# Patient Record
Sex: MALE | Race: ASIAN | NOT HISPANIC OR LATINO | URBAN - METROPOLITAN AREA
[De-identification: names, ages, dates, MRNs, and addresses within clinical notes are randomized per-mention and may not be internally consistent; named-entity substitution may affect disease eponyms.]

---

## 2017-10-10 ENCOUNTER — EMERGENCY (EMERGENCY)
Facility: HOSPITAL | Age: 72
LOS: 0 days | Discharge: HOME | End: 2017-10-10
Attending: EMERGENCY MEDICINE
Payer: MEDICARE

## 2017-10-10 VITALS
TEMPERATURE: 98 F | DIASTOLIC BLOOD PRESSURE: 65 MMHG | RESPIRATION RATE: 18 BRPM | WEIGHT: 156.09 LBS | HEART RATE: 68 BPM | OXYGEN SATURATION: 98 % | SYSTOLIC BLOOD PRESSURE: 131 MMHG

## 2017-10-10 VITALS
OXYGEN SATURATION: 99 % | DIASTOLIC BLOOD PRESSURE: 79 MMHG | RESPIRATION RATE: 17 BRPM | SYSTOLIC BLOOD PRESSURE: 139 MMHG | HEART RATE: 77 BPM | TEMPERATURE: 98 F

## 2017-10-10 LAB
ALBUMIN SERPL ELPH-MCNC: 2.3 G/DL — LOW (ref 3.3–5)
ALP SERPL-CCNC: 107 U/L — SIGNIFICANT CHANGE UP (ref 40–120)
ALT FLD-CCNC: 27 U/L — SIGNIFICANT CHANGE UP (ref 12–78)
ANION GAP SERPL CALC-SCNC: 8 MMOL/L — SIGNIFICANT CHANGE UP (ref 5–17)
APPEARANCE UR: CLEAR — SIGNIFICANT CHANGE UP
AST SERPL-CCNC: 16 U/L — SIGNIFICANT CHANGE UP (ref 15–37)
BACTERIA # UR AUTO: ABNORMAL
BASOPHILS # BLD AUTO: 0.1 K/UL — SIGNIFICANT CHANGE UP (ref 0–0.2)
BASOPHILS NFR BLD AUTO: 1.1 % — SIGNIFICANT CHANGE UP (ref 0–2)
BILIRUB SERPL-MCNC: 0.4 MG/DL — SIGNIFICANT CHANGE UP (ref 0.2–1.2)
BILIRUB UR-MCNC: NEGATIVE — SIGNIFICANT CHANGE UP
BUN SERPL-MCNC: 20 MG/DL — SIGNIFICANT CHANGE UP (ref 7–23)
CALCIUM SERPL-MCNC: 9 MG/DL — SIGNIFICANT CHANGE UP (ref 8.5–10.1)
CHLORIDE SERPL-SCNC: 103 MMOL/L — SIGNIFICANT CHANGE UP (ref 96–108)
CO2 SERPL-SCNC: 26 MMOL/L — SIGNIFICANT CHANGE UP (ref 22–31)
COLOR SPEC: YELLOW — SIGNIFICANT CHANGE UP
CREAT SERPL-MCNC: 0.93 MG/DL — SIGNIFICANT CHANGE UP (ref 0.5–1.3)
DIFF PNL FLD: ABNORMAL
EOSINOPHIL # BLD AUTO: 0.3 K/UL — SIGNIFICANT CHANGE UP (ref 0–0.5)
EOSINOPHIL NFR BLD AUTO: 3.3 % — SIGNIFICANT CHANGE UP (ref 0–6)
GLUCOSE SERPL-MCNC: 189 MG/DL — HIGH (ref 70–99)
GLUCOSE UR QL: 250 MG/DL
HCT VFR BLD CALC: 34.4 % — LOW (ref 39–50)
HGB BLD-MCNC: 11.1 G/DL — LOW (ref 13–17)
KETONES UR-MCNC: NEGATIVE — SIGNIFICANT CHANGE UP
LEUKOCYTE ESTERASE UR-ACNC: ABNORMAL
LYMPHOCYTES # BLD AUTO: 2.4 K/UL — SIGNIFICANT CHANGE UP (ref 1–3.3)
LYMPHOCYTES # BLD AUTO: 23.4 % — SIGNIFICANT CHANGE UP (ref 13–44)
MCHC RBC-ENTMCNC: 29.1 PG — SIGNIFICANT CHANGE UP (ref 27–34)
MCHC RBC-ENTMCNC: 32.2 GM/DL — SIGNIFICANT CHANGE UP (ref 32–36)
MCV RBC AUTO: 90.4 FL — SIGNIFICANT CHANGE UP (ref 80–100)
MONOCYTES # BLD AUTO: 1.5 K/UL — HIGH (ref 0–0.9)
MONOCYTES NFR BLD AUTO: 14.4 % — HIGH (ref 2–14)
NEUTROPHILS # BLD AUTO: 6 K/UL — SIGNIFICANT CHANGE UP (ref 1.8–7.4)
NEUTROPHILS NFR BLD AUTO: 57.8 % — SIGNIFICANT CHANGE UP (ref 43–77)
NITRITE UR-MCNC: NEGATIVE — SIGNIFICANT CHANGE UP
PH UR: 5 — SIGNIFICANT CHANGE UP (ref 5–8)
PLATELET # BLD AUTO: 163 K/UL — SIGNIFICANT CHANGE UP (ref 150–400)
POTASSIUM SERPL-MCNC: 3.9 MMOL/L — SIGNIFICANT CHANGE UP (ref 3.5–5.3)
POTASSIUM SERPL-SCNC: 3.9 MMOL/L — SIGNIFICANT CHANGE UP (ref 3.5–5.3)
PROT SERPL-MCNC: 6.2 GM/DL — SIGNIFICANT CHANGE UP (ref 6–8.3)
PROT UR-MCNC: NEGATIVE MG/DL — SIGNIFICANT CHANGE UP
RBC # BLD: 3.81 M/UL — LOW (ref 4.2–5.8)
RBC # FLD: 13 % — SIGNIFICANT CHANGE UP (ref 11–15)
RBC CASTS # UR COMP ASSIST: ABNORMAL /HPF (ref 0–4)
SODIUM SERPL-SCNC: 137 MMOL/L — SIGNIFICANT CHANGE UP (ref 135–145)
SP GR SPEC: 1 — LOW (ref 1.01–1.02)
UROBILINOGEN FLD QL: NEGATIVE MG/DL — SIGNIFICANT CHANGE UP
WBC # BLD: 10.4 K/UL — SIGNIFICANT CHANGE UP (ref 3.8–10.5)
WBC # FLD AUTO: 10.4 K/UL — SIGNIFICANT CHANGE UP (ref 3.8–10.5)

## 2017-10-10 PROCEDURE — 99285 EMERGENCY DEPT VISIT HI MDM: CPT | Mod: 25

## 2017-10-10 PROCEDURE — 74177 CT ABD & PELVIS W/CONTRAST: CPT | Mod: 26

## 2017-10-10 RX ORDER — CIPROFLOXACIN LACTATE 400MG/40ML
400 VIAL (ML) INTRAVENOUS ONCE
Refills: 0 | Status: COMPLETED | OUTPATIENT
Start: 2017-10-10 | End: 2017-10-10

## 2017-10-10 RX ORDER — CIPROFLOXACIN LACTATE 400MG/40ML
400 VIAL (ML) INTRAVENOUS ONCE
Refills: 0 | Status: DISCONTINUED | OUTPATIENT
Start: 2017-10-10 | End: 2017-10-10

## 2017-10-10 RX ORDER — SODIUM CHLORIDE 9 MG/ML
1000 INJECTION INTRAMUSCULAR; INTRAVENOUS; SUBCUTANEOUS ONCE
Refills: 0 | Status: COMPLETED | OUTPATIENT
Start: 2017-10-10 | End: 2017-10-10

## 2017-10-10 RX ORDER — CIPROFLOXACIN LACTATE 400MG/40ML
1 VIAL (ML) INTRAVENOUS
Qty: 14 | Refills: 0
Start: 2017-10-10 | End: 2017-10-17

## 2017-10-10 RX ADMIN — Medication 200 MILLIGRAM(S): at 09:54

## 2017-10-10 RX ADMIN — SODIUM CHLORIDE 1000 MILLILITER(S): 9 INJECTION INTRAMUSCULAR; INTRAVENOUS; SUBCUTANEOUS at 04:34

## 2017-10-10 NOTE — ED PROVIDER NOTE - MEDICAL DECISION MAKING DETAILS
Patient with persistent UTI and occasional urinary incontinence.  Lab and imaging studies were obtained in accordance with patient's chief complaint and reviewed.  Suspect poorly controlled glucose might be contributing since sugars at home have been high, but patient also has renal cell mass which needs further work up.  No signs of DKA.  Recommend completing his course of antibiotic and following up with urology and PMD.  Discussed results and outcome of today's visit with the patient.  Patient advised to please follow up with another healthcare provider within the next 24 hours and return to the Emergency Department for worsening symptoms or any other concerns.  Patient advised that their doctor may call  to follow up on the specific results of the tests performed today in the emergency department.   Patient appears well on discharge. Patient with persistent UTI and occasional urinary incontinence.  Lab and imaging studies were obtained in accordance with patient's chief complaint and reviewed.  Suspect poorly controlled glucose might be contributing since sugars at home have been high, but patient also has renal cell mass which needs further work up.  No signs of DKA.  Will given single dose IV cipro in ER with outpatient treatment (stop macrobid) and follow up with urology and PMD.  Discussed results and outcome of today's visit with the patient.  Patient advised to please follow up with another healthcare provider within the next 24 hours and return to the Emergency Department for worsening symptoms or any other concerns.  Patient advised that their doctor may call  to follow up on the specific results of the tests performed today in the emergency department.   Patient appears well on discharge. Patient with persistent UTI and occasional urinary incontinence.  Lab and imaging studies were obtained in accordance with patient's chief complaint and reviewed.  Suspect poorly controlled glucose might be contributing since sugars at home have been high, but patient also has renal cell mass which needs further work up.  No signs of DKA.  Will given single dose IV cipro in ER with outpatient treatment (stop macrobid) and follow up with urology and PMD.  Family also requesting referral for neuro due to frequent forgetfulness which has been occuring for a while now.  Discussed results and outcome of today's visit with the patient.  Patient advised to please follow up with another healthcare provider within the next 24 hours and return to the Emergency Department for worsening symptoms or any other concerns.  Patient advised that their doctor may call  to follow up on the specific results of the tests performed today in the emergency department.   Patient appears well on discharge.

## 2017-10-10 NOTE — ED PROVIDER NOTE - OBJECTIVE STATEMENT
Pertinent PMH/PSH/FHx/SHx and Review of Systems contained within:  Patient presents to the ED for   dysuria and increased frequency with urinary incontinence, feels his urine has been leaking out.  Has been having symptoms for over a week now, seen by his PMD, initially on amoxicillin, switched to Macobid (has been on it for 1 day) when no significant improvement.  Has some suprapubic discomfort, no fevers, chills, n/v, flank pain.  Initially had hematuria, but says that this cleared up.    Says that he is not sexually active.  Denies any neurologic deficits, weakness or numbness.       Relevant PMHx/SHx/SOCHx/FAMH:    HTN, HLD, DM    Patient denies EtOH/tobacco/illicit substance use.    PMD: Dr. Bryson      ROS: No fever/chills, No headache/photophobia/eye pain/changes in vision, No ear pain/sore throat/dysphagia, No chest pain/palpitations, no SOB/cough/wheeze/stridor, No abdominal pain, No N/V/D/melena, no discharge, No neck/back pain, no rash, no changes in neurological status/function.

## 2017-10-10 NOTE — ED PROVIDER NOTE - CARE PLAN
Principal Discharge DX:	UTI (urinary tract infection)  Secondary Diagnosis:	Hyperglycemia  Secondary Diagnosis:	Renal mass

## 2017-10-10 NOTE — ED ADULT NURSE NOTE - OBJECTIVE STATEMENT
pt came in with c/o burning and frequency urination with tinge of blood , denies any trauma , not in any distress, will monitor.

## 2017-10-10 NOTE — ED ADULT TRIAGE NOTE - CHIEF COMPLAINT QUOTE
I cant hold my urine, I urinate a lot.  burning on urination. Per Tosin with ANTOLIN, the pt has to be in the building before 6 pm. I updated AARTI Noe CM. Cindy Duvall LMSW

## 2017-10-10 NOTE — ED ADULT NURSE REASSESSMENT NOTE - NS ED NURSE REASSESS COMMENT FT1
Rec'd pt on stretcher appears to be in no apparent distress at this time. Awake, A&Ox4; breathing with ease on room air. NS infusing via 20G saline lock to the RT. AC, no signs of infiltration/phlebitis. Denies complaints of pain at this time. Pt just returned  from Ct. scan, awaiting results and disposition. Safety measures in place. Will continue to monitor.

## 2017-10-11 DIAGNOSIS — R32 UNSPECIFIED URINARY INCONTINENCE: ICD-10-CM

## 2017-10-11 DIAGNOSIS — E11.65 TYPE 2 DIABETES MELLITUS WITH HYPERGLYCEMIA: ICD-10-CM

## 2017-10-11 DIAGNOSIS — I10 ESSENTIAL (PRIMARY) HYPERTENSION: ICD-10-CM

## 2017-10-11 DIAGNOSIS — N39.0 URINARY TRACT INFECTION, SITE NOT SPECIFIED: ICD-10-CM

## 2017-10-11 DIAGNOSIS — N28.89 OTHER SPECIFIED DISORDERS OF KIDNEY AND URETER: ICD-10-CM

## 2017-10-11 LAB
CULTURE RESULTS: NO GROWTH — SIGNIFICANT CHANGE UP
SPECIMEN SOURCE: SIGNIFICANT CHANGE UP

## 2017-10-24 ENCOUNTER — APPOINTMENT (OUTPATIENT)
Dept: UROLOGY | Facility: CLINIC | Age: 72
End: 2017-10-24

## 2019-08-30 ENCOUNTER — EMERGENCY (EMERGENCY)
Facility: HOSPITAL | Age: 74
LOS: 0 days | Discharge: HOME | End: 2019-08-31
Attending: EMERGENCY MEDICINE
Payer: MEDICARE

## 2019-08-30 VITALS
TEMPERATURE: 97 F | SYSTOLIC BLOOD PRESSURE: 146 MMHG | HEIGHT: 65 IN | RESPIRATION RATE: 20 BRPM | DIASTOLIC BLOOD PRESSURE: 80 MMHG | OXYGEN SATURATION: 97 % | HEART RATE: 57 BPM | WEIGHT: 162.04 LBS

## 2019-08-30 DIAGNOSIS — E11.9 TYPE 2 DIABETES MELLITUS WITHOUT COMPLICATIONS: ICD-10-CM

## 2019-08-30 DIAGNOSIS — N17.9 ACUTE KIDNEY FAILURE, UNSPECIFIED: ICD-10-CM

## 2019-08-30 DIAGNOSIS — H53.8 OTHER VISUAL DISTURBANCES: ICD-10-CM

## 2019-08-30 DIAGNOSIS — I10 ESSENTIAL (PRIMARY) HYPERTENSION: ICD-10-CM

## 2019-08-30 DIAGNOSIS — H53.9 UNSPECIFIED VISUAL DISTURBANCE: ICD-10-CM

## 2019-08-30 LAB
ALBUMIN SERPL ELPH-MCNC: 3.5 G/DL — SIGNIFICANT CHANGE UP (ref 3.3–5)
ALP SERPL-CCNC: 85 U/L — SIGNIFICANT CHANGE UP (ref 40–120)
ALT FLD-CCNC: 23 U/L — SIGNIFICANT CHANGE UP (ref 12–78)
ANION GAP SERPL CALC-SCNC: 8 MMOL/L — SIGNIFICANT CHANGE UP (ref 5–17)
AST SERPL-CCNC: 14 U/L — LOW (ref 15–37)
BASOPHILS # BLD AUTO: 0.05 K/UL — SIGNIFICANT CHANGE UP (ref 0–0.2)
BASOPHILS NFR BLD AUTO: 0.7 % — SIGNIFICANT CHANGE UP (ref 0–2)
BILIRUB SERPL-MCNC: 0.2 MG/DL — SIGNIFICANT CHANGE UP (ref 0.2–1.2)
BUN SERPL-MCNC: 33 MG/DL — HIGH (ref 7–23)
CALCIUM SERPL-MCNC: 9.2 MG/DL — SIGNIFICANT CHANGE UP (ref 8.5–10.1)
CHLORIDE SERPL-SCNC: 106 MMOL/L — SIGNIFICANT CHANGE UP (ref 96–108)
CO2 SERPL-SCNC: 26 MMOL/L — SIGNIFICANT CHANGE UP (ref 22–31)
CREAT SERPL-MCNC: 1.76 MG/DL — HIGH (ref 0.5–1.3)
EOSINOPHIL # BLD AUTO: 0.42 K/UL — SIGNIFICANT CHANGE UP (ref 0–0.5)
EOSINOPHIL NFR BLD AUTO: 6.1 % — HIGH (ref 0–6)
GLUCOSE BLDC GLUCOMTR-MCNC: 169 MG/DL — HIGH (ref 70–99)
GLUCOSE SERPL-MCNC: 173 MG/DL — HIGH (ref 70–99)
HCT VFR BLD CALC: 43.6 % — SIGNIFICANT CHANGE UP (ref 39–50)
HGB BLD-MCNC: 13.9 G/DL — SIGNIFICANT CHANGE UP (ref 13–17)
IMM GRANULOCYTES NFR BLD AUTO: 0.1 % — SIGNIFICANT CHANGE UP (ref 0–1.5)
LYMPHOCYTES # BLD AUTO: 2.21 K/UL — SIGNIFICANT CHANGE UP (ref 1–3.3)
LYMPHOCYTES # BLD AUTO: 32 % — SIGNIFICANT CHANGE UP (ref 13–44)
MCHC RBC-ENTMCNC: 29.3 PG — SIGNIFICANT CHANGE UP (ref 27–34)
MCHC RBC-ENTMCNC: 31.9 GM/DL — LOW (ref 32–36)
MCV RBC AUTO: 91.8 FL — SIGNIFICANT CHANGE UP (ref 80–100)
MONOCYTES # BLD AUTO: 0.65 K/UL — SIGNIFICANT CHANGE UP (ref 0–0.9)
MONOCYTES NFR BLD AUTO: 9.4 % — SIGNIFICANT CHANGE UP (ref 2–14)
NEUTROPHILS # BLD AUTO: 3.57 K/UL — SIGNIFICANT CHANGE UP (ref 1.8–7.4)
NEUTROPHILS NFR BLD AUTO: 51.7 % — SIGNIFICANT CHANGE UP (ref 43–77)
NRBC # BLD: 0 /100 WBCS — SIGNIFICANT CHANGE UP (ref 0–0)
PLATELET # BLD AUTO: 157 K/UL — SIGNIFICANT CHANGE UP (ref 150–400)
POTASSIUM SERPL-MCNC: 5.2 MMOL/L — SIGNIFICANT CHANGE UP (ref 3.5–5.3)
POTASSIUM SERPL-SCNC: 5.2 MMOL/L — SIGNIFICANT CHANGE UP (ref 3.5–5.3)
PROT SERPL-MCNC: 7.6 GM/DL — SIGNIFICANT CHANGE UP (ref 6–8.3)
RBC # BLD: 4.75 M/UL — SIGNIFICANT CHANGE UP (ref 4.2–5.8)
RBC # FLD: 13.1 % — SIGNIFICANT CHANGE UP (ref 10.3–14.5)
SODIUM SERPL-SCNC: 140 MMOL/L — SIGNIFICANT CHANGE UP (ref 135–145)
WBC # BLD: 6.91 K/UL — SIGNIFICANT CHANGE UP (ref 3.8–10.5)
WBC # FLD AUTO: 6.91 K/UL — SIGNIFICANT CHANGE UP (ref 3.8–10.5)

## 2019-08-30 PROCEDURE — 99284 EMERGENCY DEPT VISIT MOD MDM: CPT

## 2019-08-30 RX ORDER — SODIUM CHLORIDE 9 MG/ML
1000 INJECTION INTRAMUSCULAR; INTRAVENOUS; SUBCUTANEOUS ONCE
Refills: 0 | Status: COMPLETED | OUTPATIENT
Start: 2019-08-30 | End: 2019-08-30

## 2019-08-30 RX ADMIN — SODIUM CHLORIDE 1000 MILLILITER(S): 9 INJECTION INTRAMUSCULAR; INTRAVENOUS; SUBCUTANEOUS at 23:02

## 2019-08-30 NOTE — ED PROVIDER NOTE - CLINICAL SUMMARY MEDICAL DECISION MAKING FREE TEXT BOX
Patient with blurry left vision.  VSS. Patient with blurry left eye vision.  VSS.  Labs with renal dysfunction, patient later informed me that he has single kidney, recheck of BMP shows improved initial labs, patient strongly advised to notify medical staff about his renal status.  Patient CT imaging neg for dissection.  Patient d/w ophthalmology.  Patient to be seen in office for possible retinal dysfunction today at 10 am, Dr. Xavier called patient while he was still in ER to discuss the plan.  Patient will be going to ophthalmology clinic today and is advised to f/u with his PMD for f/u and recheck kidney function.  Discussed results and outcome of today's visit with the patient.  Patient advised to please follow up with another healthcare provider within the next 24 hours and return to the Emergency Department for worsening symptoms or any other concerns.  Patient advised that their doctor may call  to follow up on the specific results of the tests performed today in the emergency department.   Patient appears well on discharge. Patient with blurry left eye vision.  VSS.  Labs with renal dysfunction, patient later informed me that he has single kidney, recheck of BMP shows improved initial labs, patient strongly advised to notify medical staff about his renal status goiong forward (added to his chart).  Patient CT imaging neg for dissection or other intracranial pathology.  Patient d/w ophthalmology.  Patient to be seen in office for possible retinal dysfunction today at 10 am, Dr. Xavier called patient while he was still in ER to discuss the plan.  Patient will be going to ophthalmology clinic today and is advised to f/u with his PMD for f/u and recheck kidney function.  Discussed results and outcome of today's visit with the patient.  Patient advised to please follow up with another healthcare provider within the next 24 hours and return to the Emergency Department for worsening symptoms or any other concerns.  Patient advised that their doctor may call  to follow up on the specific results of the tests performed today in the emergency department.   Patient appears well on discharge.

## 2019-08-30 NOTE — ED PROVIDER NOTE - PHYSICAL EXAMINATION
Gen: Alert, NAD, well appearing  Head: NC, AT, no temple tenderness  Eyes: Right eye visual acuity 20/25, left eye completely blurry, peripheral vision preserved BL, PERRL, EOMI, normal lids/conjunctiva, right eye IOP 22-23, left eye IOP 15-17, negative fluoresceine study  ENT: normal hearing, patent oropharynx without erythema/exudate, uvula midline  Neck: +supple, no tenderness/meningismus/JVD, +Trachea midline  Pulm: Bilateral BS, normal resp effort, no wheeze/stridor/retractions  CV: RRR, no M/R/G, +dist pulses  Abd: soft, NT/ND, Negative [place] signs, +BS, no palpable masses  Mskel: no edema/erythema/cyanosis  Skin: no rash, warm/dry  Neuro: AAOx3, no apparent sensory/motor deficits, coordination intact, cranial nerves intact Gen: Alert, NAD, well appearing  Head: NC, AT, no temple tenderness  Eyes: Right eye visual acuity 20/25, left eye completely blurry, peripheral vision preserved BL, PERRL, EOMI, normal lids/conjunctiva, right eye IOP 22-23, left eye IOP 15-17, negative fluoresceine study, +BL red reflex  ENT: normal hearing, patent oropharynx without erythema/exudate, uvula midline  Neck: +supple, no tenderness/meningismus/JVD, +Trachea midline  Pulm: Bilateral BS, normal resp effort, no wheeze/stridor/retractions  CV: RRR, no M/R/G, +dist pulses  Abd: soft, NT/ND, Negative [place] signs, +BS, no palpable masses  Mskel: no edema/erythema/cyanosis  Skin: no rash, warm/dry  Neuro: AAOx3, no apparent sensory/motor deficits, coordination intact, cranial nerves intact

## 2019-08-30 NOTE — ED PROVIDER NOTE - PROGRESS NOTE DETAILS
Patient case d/w ophthalmology, patient to have f/u in office at 10 am this morning.  Patient now informing me that he has one kidney, had resection 2 years ago.  Will recheck BMP, patient strongly advised to inform physicians of this going forward. Patient case d/w ophthalmology, patient to have f/u in office at 10 am this morning.  Patient now informing me that he has one kidney, had resection 2 years ago.  Labs initially overriden to r/o dissection.  Will recheck BMP, patient strongly advised to inform physicians of this going forward.

## 2019-08-30 NOTE — ED PROVIDER NOTE - PATIENT PORTAL LINK FT
You can access the FollowMyHealth Patient Portal offered by [facility] by registering at the following website: [email or web address]. By joining [employer]’s FollowMyHealth portal, you will also be able to view your health information using other applications (apps) compatible with our system.

## 2019-08-30 NOTE — ED PROVIDER NOTE - OBJECTIVE STATEMENT
Pertinent PMH/PSH/FHx/SHx and Review of Systems contained within:  Patient presents to the ED for left eye blurry vision.  Patient noted that he was seeing grainy images in his visual field yesterday and all images are very blurry, says that he was driving when he noticed it.  Denies associated headache, eye pain, curtain drop, neck pain.  Denies eye trauma.     Relevant PMHx/SHx/SOCHx/FAMH:  HTN, HLD, DM  Patient denies EtOH/tobacco/illicit substance use.    ROS: No fever/chills, No headache/photophobia/eye pain, No ear pain/sore throat/dysphagia, No chest pain/palpitations, no SOB/cough/wheeze/stridor, No abdominal pain, No N/V/D/melena, no dysuria/frequency/discharge, No neck/back pain, no rash, no changes in neurological status/function. Pertinent PMH/PSH/FHx/SHx and Review of Systems contained within:  Patient presents to the ED for left eye blurry vision.  Patient noted that he was seeing grainy images in his visual field yesterday and all images are very blurry, says that he was driving when he noticed it.  Denies associated headache, eye pain, curtain drop, neck pain.  Denies eye trauma.  Patient wears bifocals, denies use of contact lens.     Relevant PMHx/SHx/SOCHx/FAMH:  HTN, HLD, DM  Patient denies EtOH/tobacco/illicit substance use.    ROS: No fever/chills, No headache/photophobia/eye pain, No ear pain/sore throat/dysphagia, No chest pain/palpitations, no SOB/cough/wheeze/stridor, No abdominal pain, No N/V/D/melena, no dysuria/frequency/discharge, No neck/back pain, no rash, no changes in neurological status/function.

## 2019-08-31 VITALS
SYSTOLIC BLOOD PRESSURE: 154 MMHG | TEMPERATURE: 98 F | HEART RATE: 56 BPM | DIASTOLIC BLOOD PRESSURE: 87 MMHG | RESPIRATION RATE: 20 BRPM | OXYGEN SATURATION: 97 %

## 2019-08-31 DIAGNOSIS — Z90.5 ACQUIRED ABSENCE OF KIDNEY: Chronic | ICD-10-CM

## 2019-08-31 LAB
ANION GAP SERPL CALC-SCNC: 5 MMOL/L — SIGNIFICANT CHANGE UP (ref 5–17)
BUN SERPL-MCNC: 30 MG/DL — HIGH (ref 7–23)
CALCIUM SERPL-MCNC: 9 MG/DL — SIGNIFICANT CHANGE UP (ref 8.5–10.1)
CHLORIDE SERPL-SCNC: 107 MMOL/L — SIGNIFICANT CHANGE UP (ref 96–108)
CO2 SERPL-SCNC: 25 MMOL/L — SIGNIFICANT CHANGE UP (ref 22–31)
CREAT SERPL-MCNC: 1.54 MG/DL — HIGH (ref 0.5–1.3)
GLUCOSE SERPL-MCNC: 108 MG/DL — HIGH (ref 70–99)
POTASSIUM SERPL-MCNC: 4.8 MMOL/L — SIGNIFICANT CHANGE UP (ref 3.5–5.3)
POTASSIUM SERPL-SCNC: 4.8 MMOL/L — SIGNIFICANT CHANGE UP (ref 3.5–5.3)
SODIUM SERPL-SCNC: 137 MMOL/L — SIGNIFICANT CHANGE UP (ref 135–145)

## 2019-08-31 PROCEDURE — 70498 CT ANGIOGRAPHY NECK: CPT | Mod: 26

## 2019-08-31 PROCEDURE — 70496 CT ANGIOGRAPHY HEAD: CPT | Mod: 26

## 2019-09-04 ENCOUNTER — APPOINTMENT (OUTPATIENT)
Dept: OPHTHALMOLOGY | Facility: CLINIC | Age: 74
End: 2019-09-04

## 2019-09-11 ENCOUNTER — APPOINTMENT (OUTPATIENT)
Dept: OPHTHALMOLOGY | Facility: CLINIC | Age: 74
End: 2019-09-11

## 2019-09-25 ENCOUNTER — APPOINTMENT (OUTPATIENT)
Dept: OPHTHALMOLOGY | Facility: CLINIC | Age: 74
End: 2019-09-25

## 2019-10-17 ENCOUNTER — APPOINTMENT (OUTPATIENT)
Dept: ENDOCRINOLOGY | Facility: CLINIC | Age: 74
End: 2019-10-17
Payer: MEDICARE

## 2019-10-17 VITALS
WEIGHT: 163 LBS | OXYGEN SATURATION: 97 % | RESPIRATION RATE: 16 BRPM | HEART RATE: 61 BPM | SYSTOLIC BLOOD PRESSURE: 120 MMHG | HEIGHT: 65 IN | DIASTOLIC BLOOD PRESSURE: 70 MMHG | BODY MASS INDEX: 27.16 KG/M2

## 2019-10-17 DIAGNOSIS — Z00.00 ENCOUNTER FOR GENERAL ADULT MEDICAL EXAMINATION W/OUT ABNORMAL FINDINGS: ICD-10-CM

## 2019-10-17 DIAGNOSIS — Z78.9 OTHER SPECIFIED HEALTH STATUS: ICD-10-CM

## 2019-10-17 DIAGNOSIS — Z83.3 FAMILY HISTORY OF DIABETES MELLITUS: ICD-10-CM

## 2019-10-17 LAB — GLUCOSE BLDC GLUCOMTR-MCNC: 133

## 2019-10-17 PROCEDURE — 36415 COLL VENOUS BLD VENIPUNCTURE: CPT

## 2019-10-17 PROCEDURE — 99204 OFFICE O/P NEW MOD 45 MIN: CPT | Mod: 25

## 2019-10-17 RX ORDER — INSULIN LISPRO 100 [IU]/ML
100 INJECTION, SOLUTION INTRAVENOUS; SUBCUTANEOUS
Refills: 0 | Status: DISCONTINUED | COMMUNITY
End: 2019-10-17

## 2019-10-17 RX ORDER — REPAGLINIDE AND METFORMIN HYDROCHLORIDE 1; 500 MG/1; MG/1
TABLET ORAL
Refills: 0 | Status: DISCONTINUED | COMMUNITY
End: 2019-10-17

## 2019-10-17 RX ORDER — INSULIN GLARGINE 100 [IU]/ML
INJECTION, SOLUTION SUBCUTANEOUS
Refills: 0 | Status: DISCONTINUED | COMMUNITY
End: 2019-10-17

## 2019-10-17 NOTE — CONSULT LETTER
[Sincerely,] : Sincerely, [Dear  ___] : Dear  [unfilled], [FreeTextEntry1] : Thank you for referring  Mr. SHERWIN MINOR to me for evaluation and treatment. Please, see attached consultation note. As always, if there are specific questions you would like to discuss, please feel free to contact me.\par Thank you for the courtesy of this evaluation.\par  [FreeTextEntry3] : Robina Gilbert MD, FACE, ECNU\par

## 2019-10-17 NOTE — HISTORY OF PRESENT ILLNESS
[FreeTextEntry1] : HISTORY OF PRESENT ILLNESS. \par \par Mr. MINOR was diagnosed with Diabetes Mellitus Type 2 in 1997\par Reports history HTN, dyslipidemia, renal hemorrhage (s/p right nephrectomy), PRDRP (s/p laser therapy)\par He denies CAD. He was previously on Lantus 10 units , Humalog and metformin, but stopped everything about 2-3 months ago\par Presently on repaglinide (not sure of the dose)\par Blood sugars are checked once a day. \par Did not bring log book, but reported are typically as following: FBS- 117-200, not checking other times \par Hypoglycemia frequency: twice a month\par Fingerstick glucose in the office today is 133 mg/dL  hours after eating. \par Diet: not following ADA\par Exercise: none\par \par Last dilated eye - 9/19\par Last podiatry visit  - none\par Last cardiology evaluation - 2017\par Last stress test - 2017, normal per patient\par Last 2-D Echo - 2017, normal per patient\par Last nephrology evaluation - 6/19 \par Last neurology evaluation- none\par \par Lab review: none available\par \par

## 2019-10-17 NOTE — ASSESSMENT
[Carbohydrate Consistent Diet] : carbohydrate consistent diet [Diabetes Foot Care] : diabetes foot care [Hypoglycemia Management] : hypoglycemia management [Long Term Vascular Complications] : long term vascular complications of diabetes [Self Monitoring of Blood Glucose] : self monitoring of blood glucose [Action and use of Insulin] : action and use of short and long-acting insulin [FreeTextEntry1] : Current approaches to diabetes management are discussed with the patient. \par Target ranges for blood sugar, blood pressure and cholesterol reviewed, and risk reduction strategies verified. \par Hypoglycemia precautions reviewed with the patient. \par Suggested extensive diabetes education program, including nutritional and diabetes teaching and evaluation. \par Proper dietary restrictions and exercise routines discussed. \par Glucometer/SMBG and log book charting discussed.\par - I need more information about FS records and lab work prior to making nay adjustments\par - continue prandin for now. Patient is advised to update us with the dose of his medication\par - labs today\par - Breanna PRO\par - advised to bring the reports of his stress tests/ echo\par - podiatry evaluation\par RTC 3 weeks for sensor download and CDE evaluation\par

## 2019-10-18 LAB
25(OH)D3 SERPL-MCNC: 32.1 NG/ML
ALBUMIN SERPL ELPH-MCNC: 4.4 G/DL
ALP BLD-CCNC: 78 U/L
ALT SERPL-CCNC: 15 U/L
ANION GAP SERPL CALC-SCNC: 12 MMOL/L
AST SERPL-CCNC: 15 U/L
BILIRUB SERPL-MCNC: 0.4 MG/DL
BUN SERPL-MCNC: 40 MG/DL
C PEPTIDE SERPL-MCNC: 3.5 NG/ML
CALCIUM SERPL-MCNC: 10.6 MG/DL
CHLORIDE SERPL-SCNC: 102 MMOL/L
CHOLEST SERPL-MCNC: 140 MG/DL
CHOLEST/HDLC SERPL: 3.9 RATIO
CO2 SERPL-SCNC: 20 MMOL/L
CREAT SERPL-MCNC: 1.78 MG/DL
CREAT SPEC-SCNC: 65 MG/DL
ESTIMATED AVERAGE GLUCOSE: 183 MG/DL
FRUCTOSAMINE SERPL-MCNC: 309 UMOL/L
GLUCOSE SERPL-MCNC: 145 MG/DL
GLYCOMARK.: 3.9 UG/ML
HBA1C MFR BLD HPLC: 8 %
HDLC SERPL-MCNC: 36 MG/DL
INSULIN SERPL-MCNC: 7.9 UU/ML
LDLC SERPL CALC-MCNC: 88 MG/DL
MICROALBUMIN 24H UR DL<=1MG/L-MCNC: 7.3 MG/DL
MICROALBUMIN/CREAT 24H UR-RTO: 112 MG/G
POTASSIUM SERPL-SCNC: 5.4 MMOL/L
PROT SERPL-MCNC: 7.2 G/DL
SODIUM SERPL-SCNC: 134 MMOL/L
T4 FREE SERPL-MCNC: 1.6 NG/DL
TRIGL SERPL-MCNC: 82 MG/DL
TSH SERPL-ACNC: 1.08 UIU/ML
VIT B12 SERPL-MCNC: 1451 PG/ML

## 2019-10-23 ENCOUNTER — APPOINTMENT (OUTPATIENT)
Dept: OPHTHALMOLOGY | Facility: CLINIC | Age: 74
End: 2019-10-23

## 2019-11-08 ENCOUNTER — APPOINTMENT (OUTPATIENT)
Dept: ENDOCRINOLOGY | Facility: CLINIC | Age: 74
End: 2019-11-08
Payer: MEDICARE

## 2019-11-08 VITALS
HEART RATE: 60 BPM | SYSTOLIC BLOOD PRESSURE: 102 MMHG | BODY MASS INDEX: 26.99 KG/M2 | HEIGHT: 65 IN | DIASTOLIC BLOOD PRESSURE: 50 MMHG | WEIGHT: 162 LBS | RESPIRATION RATE: 16 BRPM | OXYGEN SATURATION: 97 %

## 2019-11-08 LAB — GLUCOSE BLDC GLUCOMTR-MCNC: 232

## 2019-11-08 PROCEDURE — 99214 OFFICE O/P EST MOD 30 MIN: CPT | Mod: 25

## 2019-11-08 PROCEDURE — 95250 CONT GLUC MNTR PHYS/QHP EQP: CPT

## 2019-11-08 PROCEDURE — 95251 CONT GLUC MNTR ANALYSIS I&R: CPT

## 2019-11-08 NOTE — HISTORY OF PRESENT ILLNESS
[FreeTextEntry1] : *** Nov 08, 2019 ***\par \par Patient returned for karolyn interpretation and diabetes management\par see details below:\par \par Date sensor was placed: 10/18/19\par Date of download:  11/6/19\par Diabetes Medications and Dosage: taking lantus 15 to 18 un qd 4/wk, prandin 1mg ac cerain meals\par Indication for CGMS: verify a change in the treatment regimen, identify periods of hypoglycemia/ hyperglycemia. \par Modal day report: pattern. \par Hypoglycemia: patterned, Pt with HYPO 7 % of the time (2% below 54), 65 % in target range\par Hyperglycemia: 28 % elevation \par Identified issues: carbohydrate counting; hypo's after taking prandin and not eating\par dates analyzed: 10/18/19 - 10/30/19\par \par took 15 units of lantus this am, and prandin 1mg ac B- today in the office p/B- 232\par a1c- 8.0,  urine m/alb- 112, \par c-pept- 3.5 w/ insulin -7.9\par creat- 1.78, K- 5.4, ca- 10.6\par LDL- 88\par \par HISTORY OF PRESENT ILLNESS. \par \par Mr. MINOR was diagnosed with Diabetes Mellitus Type 2 in 1997\par Reports history HTN, dyslipidemia, renal hemorrhage (s/p right nephrectomy), PRDRP (s/p laser therapy)\par He denies CAD. He was previously on Lantus 10 units , Humalog and metformin, but stopped everything about 2-3 months ago\par Presently on repaglinide (not sure of the dose)\par Blood sugars are checked once a day. \par Did not bring log book, but reported are typically as following: FBS- 117-200, not checking other times \par Hypoglycemia frequency: twice a month\par Fingerstick glucose in the office today is 133 mg/dL  hours after eating. \par Diet: not following ADA\par Exercise: none\par \par Last dilated eye - 9/19\par Last podiatry visit  - none\par Last cardiology evaluation - 2017\par Last stress test - 2017, normal per patient\par Last 2-D Echo - 2017, normal per patient\par Last nephrology evaluation - 6/19 \par Last neurology evaluation- none\par \par Lab review: none available\par \par

## 2019-11-08 NOTE — ASSESSMENT
[Carbohydrate Consistent Diet] : carbohydrate consistent diet [Hypoglycemia Management] : hypoglycemia management [Diabetes Foot Care] : diabetes foot care [Long Term Vascular Complications] : long term vascular complications of diabetes [Action and use of Insulin] : action and use of short and long-acting insulin [Self Monitoring of Blood Glucose] : self monitoring of blood glucose [FreeTextEntry1] : - d/c prandin\par - resume daily Lantus 14 units\par - resume Humalog ac B (8-8-1-0-5-8-10-11-12), ac L ( 3-2-8-8-9-10-11-12-13), ac D (7-8-5-2-9-4-9-10-11)\par - will consider adding DPP4 next visit\par - advised to bring the reports of his stress tests/ echo\par - podiatry evaluation\par - repeat ca/PTH, SPEP/SFLC. f/u renal\par RTC 2 mos and CDE evaluation in between\par

## 2019-11-27 ENCOUNTER — LABORATORY RESULT (OUTPATIENT)
Age: 74
End: 2019-11-27

## 2019-12-04 ENCOUNTER — APPOINTMENT (OUTPATIENT)
Dept: OPHTHALMOLOGY | Facility: CLINIC | Age: 74
End: 2019-12-04

## 2019-12-18 ENCOUNTER — APPOINTMENT (OUTPATIENT)
Dept: OPHTHALMOLOGY | Facility: CLINIC | Age: 74
End: 2019-12-18

## 2020-01-10 ENCOUNTER — APPOINTMENT (OUTPATIENT)
Dept: ENDOCRINOLOGY | Facility: CLINIC | Age: 75
End: 2020-01-10

## 2020-01-19 ENCOUNTER — EMERGENCY (EMERGENCY)
Facility: HOSPITAL | Age: 75
LOS: 0 days | Discharge: HOME | End: 2020-01-19
Payer: MEDICARE

## 2020-01-19 VITALS
SYSTOLIC BLOOD PRESSURE: 144 MMHG | OXYGEN SATURATION: 100 % | HEIGHT: 65 IN | DIASTOLIC BLOOD PRESSURE: 81 MMHG | HEART RATE: 62 BPM | TEMPERATURE: 97 F | RESPIRATION RATE: 16 BRPM | WEIGHT: 164.91 LBS

## 2020-01-19 DIAGNOSIS — K02.9 DENTAL CARIES, UNSPECIFIED: ICD-10-CM

## 2020-01-19 DIAGNOSIS — K08.89 OTHER SPECIFIED DISORDERS OF TEETH AND SUPPORTING STRUCTURES: ICD-10-CM

## 2020-01-19 DIAGNOSIS — Z90.5 ACQUIRED ABSENCE OF KIDNEY: Chronic | ICD-10-CM

## 2020-01-19 DIAGNOSIS — E11.9 TYPE 2 DIABETES MELLITUS WITHOUT COMPLICATIONS: ICD-10-CM

## 2020-01-19 DIAGNOSIS — Z90.5 ACQUIRED ABSENCE OF KIDNEY: ICD-10-CM

## 2020-01-19 DIAGNOSIS — I10 ESSENTIAL (PRIMARY) HYPERTENSION: ICD-10-CM

## 2020-01-19 PROCEDURE — 99283 EMERGENCY DEPT VISIT LOW MDM: CPT

## 2020-01-19 RX ORDER — IBUPROFEN 200 MG
600 TABLET ORAL ONCE
Refills: 0 | Status: COMPLETED | OUTPATIENT
Start: 2020-01-19 | End: 2020-01-19

## 2020-01-19 RX ADMIN — Medication 600 MILLIGRAM(S): at 16:52

## 2020-01-19 NOTE — ED PROVIDER NOTE - ENMT, MLM
Airway patent, Nasal mucosa clear. Mouth with normal mucosa. Throat has no vesicles, no oropharyngeal exudates and uvula is midline.  LEFT LOWER POSTER- MULTIPLE CARRIES, NO ABSCESS NOTED +POSITIVE DENTURES

## 2020-01-19 NOTE — ED PROVIDER NOTE - CLINICAL SUMMARY MEDICAL DECISION MAKING FREE TEXT BOX
KATHRYN.  Pt wants to go to [facility] to see a dentist now and doesn't want to go to private dental clinic tomorrow bc he said it doesn't take his insurance

## 2020-01-19 NOTE — ED PROVIDER NOTE - PATIENT PORTAL LINK FT
You can access the FollowMyHealth Patient Portal offered by [facility] by registering at the following website: [email or web address]. By joining [employer]’s FollowMyHealth portal, you will also be able to view your health information using other applications (apps) compatible with our system.

## 2020-01-20 ENCOUNTER — EMERGENCY (EMERGENCY)
Facility: HOSPITAL | Age: 75
LOS: 1 days | Discharge: HOME | End: 2020-01-20
Admitting: STUDENT IN AN ORGANIZED HEALTH CARE EDUCATION/TRAINING PROGRAM
Payer: COMMERCIAL

## 2020-01-20 VITALS
HEIGHT: 65 IN | OXYGEN SATURATION: 98 % | DIASTOLIC BLOOD PRESSURE: 98 MMHG | HEART RATE: 61 BPM | RESPIRATION RATE: 16 BRPM | TEMPERATURE: 98 F | SYSTOLIC BLOOD PRESSURE: 168 MMHG

## 2020-01-20 DIAGNOSIS — Z90.5 ACQUIRED ABSENCE OF KIDNEY: Chronic | ICD-10-CM

## 2020-01-20 PROBLEM — I10 ESSENTIAL (PRIMARY) HYPERTENSION: Chronic | Status: ACTIVE | Noted: 2019-08-30

## 2020-01-20 PROBLEM — E11.9 TYPE 2 DIABETES MELLITUS WITHOUT COMPLICATIONS: Chronic | Status: ACTIVE | Noted: 2019-08-30

## 2020-01-20 PROCEDURE — 99283 EMERGENCY DEPT VISIT LOW MDM: CPT

## 2020-01-20 RX ORDER — ACETAMINOPHEN 500 MG
975 TABLET ORAL ONCE
Refills: 0 | Status: COMPLETED | OUTPATIENT
Start: 2020-01-20 | End: 2020-01-20

## 2020-01-20 RX ORDER — ACETAMINOPHEN 500 MG
2 TABLET ORAL
Qty: 40 | Refills: 0
Start: 2020-01-20 | End: 2020-01-29

## 2020-01-20 RX ADMIN — Medication 975 MILLIGRAM(S): at 20:10

## 2020-01-20 NOTE — ED PROVIDER NOTE - NSFOLLOWUPINSTRUCTIONS_ED_ALL_ED_FT
Follow up with your primary doctor and the dental clinic. Take Tylenol as needed for pain. The dental clinc is at [phone] [place] [address] and the phone number is . Return to the ER for fever, chills, nightsweats, inability to open or close your mouth or for any other symptoms that concern you. Advance activity as tolerated.  Continue all previously prescribed medications as directed.  Follow up with your primary care physician in 48-72 hours- bring copies of your results.  Return to the ER for worsening or persistent symptoms, and/or ANY NEW OR CONCERNING SYMPTOMS. If you have issues obtaining follow up, please call: [phone] to obtain a doctor or specialist who takes your insurance in your area.  You may call [phone] to make an appointment with the internal medicine clinic.

## 2020-01-20 NOTE — ED PROVIDER NOTE - PHYSICAL EXAMINATION
No extraoral swelling or trismus. No gingival hypertophy or other signs of abscess. No obviously cracked tooth or obvious decay.

## 2020-01-20 NOTE — ED PROVIDER NOTE - CLINICAL SUMMARY MEDICAL DECISION MAKING FREE TEXT BOX
73 Y/O M PMH HTN, DM, Nephrectomy C/O dental pain on the L side of his mouth only when he chews. Pt seen by dental, no acute infection or other dental emergency. Pt is well appearing. Will give tylenol for pain, pt denies frequent ETOH use or known liver issues, pt has DM and HTN and is S/P nephrectomy. No acute distress.

## 2020-01-20 NOTE — ED PROVIDER NOTE - OBJECTIVE STATEMENT
73 Y/O M PMH HTN, DM, Nephrectomy C/O dental pain on the L side of his mouth only when he chews. Pt denies fever/chills/nightsweats or pain with opening or closing his jaw. Pt states that he had this issue in 2013 and saw his dentist and had a filling. Pt states he was seen at [facility] yesterday and had motrin which improved pain. Pt denies any other sx or acute complaints, pt has not taken any medication for pain while at home.

## 2020-01-20 NOTE — ED PROVIDER NOTE - PATIENT PORTAL LINK FT
You can access the FollowMyHealth Patient Portal offered by [facility] by registering at the following website: [email or web address]. By joining [employer]’s FollowMyHealth portal, you will also be able to view your health information using other applications (apps) compatible with our system.

## 2020-01-20 NOTE — PROGRESS NOTE ADULT - SUBJECTIVE AND OBJECTIVE BOX
CC: 73 y/o M presents with pain in the lower left side no associated swelling.    HPI: Patient reports pain started days ago and many, many years ago he had a filling done in that area. He believes it is the same tooth that is bothering him. He states it bothers him when he is chewing on something hard. Pt denies vomiting, nausea, difficulty breathing or swallowing.     Med HX:HTN (hypertension)  Diabetes  History of kidney removal  TOOTH PAIN  1      RX:    Social Hx: non-contributory    EOE:   TMJ (WNL)  Trismus (-)  LAD (-)  Dysphagia (-)  Swelling (-)    IOE: Permanent dentition. Multiple restorations.   #19: (+) percussion  Hard/Soft palate (WNL)  Tongue/Floor of Mouth (WNL)  Buccal Mucosa (WNL)  Palpation (-)  Mobility (-)   Swelling (-)    Radiographs: PAN    Assessment: Large restoration on #19 that needs a new restoration    Treatment: Discussed clinical and radiographic findings with patient. No treatment indicated at this time due to no associated facial or gingival swelling, abscess present, or fistula present. Recommended patient be referred to either outpatient private dentist or [facility] adult dental for comprehensive dental care. All questions answered.     Recommendations:   1. OTC pain medications as needed.  2. Seek comprehensive dental care with outpatient private dentist or [facility] adult dental clinic [phone].  5. If any difficulty breathing/swallowing or fever and swelling occur, return to ED.    Keisha Rust DDS #[id number]

## 2020-01-20 NOTE — ED ADULT TRIAGE NOTE - CHIEF COMPLAINT QUOTE
A&OX3 c/o left sided mouth pain, left side appears swollen no redness noted denies any recent dental work, no fever chills

## 2020-01-22 ENCOUNTER — APPOINTMENT (OUTPATIENT)
Age: 75
End: 2020-01-22

## 2020-04-02 ENCOUNTER — TRANSCRIPTION ENCOUNTER (OUTPATIENT)
Age: 75
End: 2020-04-02

## 2020-04-05 ENCOUNTER — APPOINTMENT (OUTPATIENT)
Dept: DISASTER EMERGENCY | Facility: CLINIC | Age: 75
End: 2020-04-05
Payer: MEDICARE

## 2020-04-05 VITALS — OXYGEN SATURATION: 96 % | HEART RATE: 68 BPM | TEMPERATURE: 97.9 F

## 2020-04-05 DIAGNOSIS — Z20.828 CONTACT WITH AND (SUSPECTED) EXPOSURE TO OTHER VIRAL COMMUNICABLE DISEASES: ICD-10-CM

## 2020-04-05 PROCEDURE — 99202 OFFICE O/P NEW SF 15 MIN: CPT

## 2020-04-05 NOTE — HISTORY OF PRESENT ILLNESS
[Patient presents to the office today for COVID-19 evaluation and testing.] : Patient presents to the office today for COVID-19 evaluation and testing. [] : no dizziness on standing [With Confirmed Case] : patient exposed to a confirmed case of COVID-19 [Age >= 60 years] : age >= 60 years [None] : none [Speaks in full sentences] : speaks in full sentences [Normal O2 sat at rest] : normal O2 sat at rest [COVID-19 testing ordered and specimen obtained] : COVID-19 testing ordered and specimen obtained [FreeTextEntry1] : 74yoM PMH DM2, HTN, CKD presents with dry cough, body ache\par + COVID exposure

## 2020-04-07 LAB — SARS-COV-2 N GENE NPH QL NAA+PROBE: DETECTED

## 2020-04-08 ENCOUNTER — APPOINTMENT (OUTPATIENT)
Dept: OPHTHALMOLOGY | Facility: CLINIC | Age: 75
End: 2020-04-08

## 2020-05-14 NOTE — ED ADULT NURSE NOTE - CADM POA PRESS ULCER
LOV 6/3/2019    No future appts scheduled. Health Maintenance   Topic Date Due    Pneumococcal 0-64 years Vaccine (1 of 1 - PPSV23) 12/28/1972    Diabetic foot exam  12/28/1976    Diabetic retinal exam  12/28/1976    HIV screen  12/28/1981    Diabetic microalbuminuria test  12/28/1984    Hepatitis B vaccine (1 of 3 - Risk 3-dose series) 12/28/1985    DTaP/Tdap/Td vaccine (1 - Tdap) 12/28/1985    Cervical cancer screen  12/28/1987    Shingles Vaccine (1 of 2) 12/28/2016    A1C test (Diabetic or Prediabetic)  06/03/2020    Lipid screen  07/02/2020    TSH testing  07/02/2020    Potassium monitoring  07/02/2020    Creatinine monitoring  07/02/2020    Flu vaccine (Season Ended) 09/01/2020    Breast cancer screen  06/11/2021    Colon cancer screen colonoscopy  01/15/2028    Hepatitis A vaccine  Aged Out    Hib vaccine  Aged Out    Meningococcal (ACWY) vaccine  Aged Out             (applicable per patient's age: Cancer Screenings, Depression Screening, Fall Risk Screening, Immunizations)    Hemoglobin A1C (%)   Date Value   06/03/2019 8.2   02/21/2019 8.0   11/12/2018 7.9     LDL Calculated (mg/dL)   Date Value   07/02/2019 153 (H)     AST (U/L)   Date Value   07/02/2019 25     ALT (U/L)   Date Value   07/02/2019 40 (H)     BUN (mg/dL)   Date Value   07/02/2019 14      (goal A1C is < 7)   (goal LDL is <100) need 30-50% reduction from baseline     BP Readings from Last 3 Encounters:   06/17/19 132/79   06/03/19 129/81   06/03/19 122/78    (goal /80)      All Future Testing planned in [facility]:      Next Visit Date:  No future appointments.          Patient Active Problem List:     Hypothyroidism     Type 2 diabetes mellitus without complication, with long-term current use of insulin (HCC)     Chronic abdominal pain No

## 2020-10-26 ENCOUNTER — APPOINTMENT (OUTPATIENT)
Dept: ENDOCRINOLOGY | Facility: CLINIC | Age: 75
End: 2020-10-26
Payer: MEDICARE

## 2020-10-26 VITALS
HEIGHT: 65 IN | BODY MASS INDEX: 27.49 KG/M2 | TEMPERATURE: 97.6 F | SYSTOLIC BLOOD PRESSURE: 142 MMHG | OXYGEN SATURATION: 99 % | RESPIRATION RATE: 16 BRPM | WEIGHT: 165 LBS | DIASTOLIC BLOOD PRESSURE: 70 MMHG | HEART RATE: 66 BPM

## 2020-10-26 LAB — GLUCOSE BLDC GLUCOMTR-MCNC: 189

## 2020-10-26 PROCEDURE — 99214 OFFICE O/P EST MOD 30 MIN: CPT | Mod: 25

## 2020-10-26 PROCEDURE — 95251 CONT GLUC MNTR ANALYSIS I&R: CPT

## 2020-10-26 PROCEDURE — 99072 ADDL SUPL MATRL&STAF TM PHE: CPT

## 2020-10-26 PROCEDURE — 95250 CONT GLUC MNTR PHYS/QHP EQP: CPT

## 2020-10-26 NOTE — HISTORY OF PRESENT ILLNESS
[FreeTextEntry1] : F/u for diabetes management\par \par *** Oct 26, 2020 ***\par \par last visit in 11/19\par recovered from COVID\par on  Lantus 14 units but stopped taking Humalog\par labs from 9/20- a1c- 9.1, creat- 1.84, ca-9.7\par drives cab, irregular eating habits\par \par *** Nov 08, 2019 ***\par \par Patient returned for karolyn interpretation and diabetes management\par see details below:\par \par Date sensor was placed: 10/18/19\par Date of download:  11/6/19\par Diabetes Medications and Dosage: taking lantus 15 to 18 un qd 4/wk, prandin 1mg ac cerain meals\par Indication for CGMS: verify a change in the treatment regimen, identify periods of hypoglycemia/ hyperglycemia. \par Modal day report: pattern. \par Hypoglycemia: patterned, Pt with HYPO 7 % of the time (2% below 54), 65 % in target range\par Hyperglycemia: 28 % elevation \par Identified issues: carbohydrate counting; hypo's after taking prandin and not eating\par dates analyzed: 10/18/19 - 10/30/19\par \par took 15 units of lantus this am, and prandin 1mg ac B- today in the office p/B- 232\par a1c- 8.0,  urine m/alb- 112, \par c-pept- 3.5 w/ insulin -7.9\par creat- 1.78, K- 5.4, ca- 10.6\par LDL- 88\par \par HISTORY OF PRESENT ILLNESS. \par \par Mr. MINOR was diagnosed with Diabetes Mellitus Type 2 in 1997\par Reports history HTN, dyslipidemia, renal hemorrhage (s/p right nephrectomy), PRDRP (s/p laser therapy)\par He denies CAD. He was previously on Lantus 10 units , Humalog and metformin, but stopped everything about 2-3 months ago\par Presently on repaglinide (not sure of the dose)\par Blood sugars are checked once a day. \par Did not bring log book, but reported are typically as following: FBS- 117-200, not checking other times \par Hypoglycemia frequency: twice a month\par Fingerstick glucose in the office today is 133 mg/dL  hours after eating. \par Diet: not following ADA\par Exercise: none\par \par \par ****\par Last dilated eye - 9/19\par Last podiatry visit  - none\par Last cardiology evaluation - 2017\par Last stress test - 2017, normal per patient\par Last 2-D Echo - 2017, normal per patient\par Last nephrology evaluation - 6/19 \par Last neurology evaluation- none\par \par

## 2020-10-26 NOTE — ASSESSMENT
[Carbohydrate Consistent Diet] : carbohydrate consistent diet [Hypoglycemia Management] : hypoglycemia management [Diabetes Foot Care] : diabetes foot care [Long Term Vascular Complications] : long term vascular complications of diabetes [Action and use of Insulin] : action and use of short and long-acting insulin [Self Monitoring of Blood Glucose] : self monitoring of blood glucose [FreeTextEntry1] : - advised on meds compliance\par - resume daily Lantus 14 units\par - resume Humalog ac B (2-8-2-7-7-5-10-11-12), ac L ( 8-8-2-8-9-10-11-12-13), ac D (8-6-8-3-8-9-9-10-11)\par - add Januvia 25 mg qd. Will consider using a small dose of SGLT2 inhibitors\par - Breanna PRO\par - will review with CDE personal sensors\par - advised to bring the reports of his stress tests/ echo\par - podiatry evaluation\par - monitor ca/PTH, SPEP/SFLC. f/u renal\par RTC 3 mos and CDE evaluation in between\par

## 2020-11-23 ENCOUNTER — RX RENEWAL (OUTPATIENT)
Age: 75
End: 2020-11-23

## 2020-11-23 ENCOUNTER — APPOINTMENT (OUTPATIENT)
Dept: ENDOCRINOLOGY | Facility: CLINIC | Age: 75
End: 2020-11-23
Payer: MEDICARE

## 2020-11-23 PROCEDURE — G0108 DIAB MANAGE TRN  PER INDIV: CPT

## 2020-11-25 NOTE — ED PROVIDER NOTE - DISCHARGE DATE
20-Jan-2020 Routine observation Routine observation Routine observation Routine observation Routine observation Routine observation Routine observation Routine observation Routine observation Routine observation Routine observation Routine observation Routine observation Routine observation Routine observation Routine observation Routine observation Routine observation Routine observation Routine observation Routine observation Routine observation Routine observation

## 2020-12-05 NOTE — ED ADULT TRIAGE NOTE - HEIGHT IN CM
[facility] [facility] Concussion Program  Concussion Program  [address] [place] [address]   Phone: [phone]  Fax:   Follow Up Time: 4-6 Days

## 2020-12-23 ENCOUNTER — APPOINTMENT (OUTPATIENT)
Dept: ENDOCRINOLOGY | Facility: CLINIC | Age: 75
End: 2020-12-23
Payer: MEDICARE

## 2020-12-23 PROCEDURE — G0108 DIAB MANAGE TRN  PER INDIV: CPT

## 2020-12-23 PROCEDURE — 99072 ADDL SUPL MATRL&STAF TM PHE: CPT

## 2021-01-06 ENCOUNTER — APPOINTMENT (OUTPATIENT)
Dept: ENDOCRINOLOGY | Facility: CLINIC | Age: 76
End: 2021-01-06

## 2021-02-22 NOTE — ED ADULT NURSE NOTE - NS_NURSE_DISC_TEACHING_YN_ED_ALL_ED
[Yes

## 2021-03-09 ENCOUNTER — APPOINTMENT (OUTPATIENT)
Dept: ENDOCRINOLOGY | Facility: CLINIC | Age: 76
End: 2021-03-09

## 2021-05-26 ENCOUNTER — NON-APPOINTMENT (OUTPATIENT)
Age: 76
End: 2021-05-26

## 2021-06-06 ENCOUNTER — EMERGENCY (EMERGENCY)
Facility: HOSPITAL | Age: 76
LOS: 0 days | Discharge: HOME | End: 2021-06-07
Attending: STUDENT IN AN ORGANIZED HEALTH CARE EDUCATION/TRAINING PROGRAM
Payer: MEDICARE

## 2021-06-06 VITALS
RESPIRATION RATE: 17 BRPM | DIASTOLIC BLOOD PRESSURE: 79 MMHG | OXYGEN SATURATION: 97 % | TEMPERATURE: 98 F | SYSTOLIC BLOOD PRESSURE: 160 MMHG | HEIGHT: 65 IN | WEIGHT: 119.93 LBS | HEART RATE: 87 BPM

## 2021-06-06 VITALS
TEMPERATURE: 98 F | OXYGEN SATURATION: 98 % | SYSTOLIC BLOOD PRESSURE: 125 MMHG | RESPIRATION RATE: 17 BRPM | DIASTOLIC BLOOD PRESSURE: 74 MMHG | HEART RATE: 93 BPM

## 2021-06-06 DIAGNOSIS — I10 ESSENTIAL (PRIMARY) HYPERTENSION: ICD-10-CM

## 2021-06-06 DIAGNOSIS — R05 COUGH: ICD-10-CM

## 2021-06-06 DIAGNOSIS — Z90.5 ACQUIRED ABSENCE OF KIDNEY: Chronic | ICD-10-CM

## 2021-06-06 DIAGNOSIS — Z20.822 CONTACT WITH AND (SUSPECTED) EXPOSURE TO COVID-19: ICD-10-CM

## 2021-06-06 DIAGNOSIS — J02.9 ACUTE PHARYNGITIS, UNSPECIFIED: ICD-10-CM

## 2021-06-06 DIAGNOSIS — E11.9 TYPE 2 DIABETES MELLITUS WITHOUT COMPLICATIONS: ICD-10-CM

## 2021-06-06 DIAGNOSIS — Z90.5 ACQUIRED ABSENCE OF KIDNEY: ICD-10-CM

## 2021-06-06 LAB
ALBUMIN SERPL ELPH-MCNC: 3.2 G/DL — LOW (ref 3.3–5)
ALP SERPL-CCNC: 92 U/L — SIGNIFICANT CHANGE UP (ref 40–120)
ALT FLD-CCNC: 25 U/L — SIGNIFICANT CHANGE UP (ref 12–78)
ANION GAP SERPL CALC-SCNC: 4 MMOL/L — LOW (ref 5–17)
AST SERPL-CCNC: 9 U/L — LOW (ref 15–37)
BASOPHILS # BLD AUTO: 0.04 K/UL — SIGNIFICANT CHANGE UP (ref 0–0.2)
BASOPHILS NFR BLD AUTO: 0.3 % — SIGNIFICANT CHANGE UP (ref 0–2)
BILIRUB SERPL-MCNC: 0.4 MG/DL — SIGNIFICANT CHANGE UP (ref 0.2–1.2)
BUN SERPL-MCNC: 27 MG/DL — HIGH (ref 7–23)
CALCIUM SERPL-MCNC: 8.8 MG/DL — SIGNIFICANT CHANGE UP (ref 8.5–10.1)
CHLORIDE SERPL-SCNC: 102 MMOL/L — SIGNIFICANT CHANGE UP (ref 96–108)
CO2 SERPL-SCNC: 27 MMOL/L — SIGNIFICANT CHANGE UP (ref 22–31)
CREAT SERPL-MCNC: 1.96 MG/DL — HIGH (ref 0.5–1.3)
D DIMER BLD IA.RAPID-MCNC: 169 NG/ML DDU — SIGNIFICANT CHANGE UP
EOSINOPHIL # BLD AUTO: 0.16 K/UL — SIGNIFICANT CHANGE UP (ref 0–0.5)
EOSINOPHIL NFR BLD AUTO: 1.3 % — SIGNIFICANT CHANGE UP (ref 0–6)
FLUAV AG NPH QL: SIGNIFICANT CHANGE UP
FLUBV AG NPH QL: SIGNIFICANT CHANGE UP
GLUCOSE BLDC GLUCOMTR-MCNC: 251 MG/DL — HIGH (ref 70–99)
GLUCOSE BLDC GLUCOMTR-MCNC: 318 MG/DL — HIGH (ref 70–99)
GLUCOSE SERPL-MCNC: 389 MG/DL — HIGH (ref 70–99)
HCT VFR BLD CALC: 42.1 % — SIGNIFICANT CHANGE UP (ref 39–50)
HGB BLD-MCNC: 13.7 G/DL — SIGNIFICANT CHANGE UP (ref 13–17)
IMM GRANULOCYTES NFR BLD AUTO: 0.2 % — SIGNIFICANT CHANGE UP (ref 0–1.5)
LIDOCAIN IGE QN: 238 U/L — SIGNIFICANT CHANGE UP (ref 73–393)
LYMPHOCYTES # BLD AUTO: 0.99 K/UL — LOW (ref 1–3.3)
LYMPHOCYTES # BLD AUTO: 8.1 % — LOW (ref 13–44)
MAGNESIUM SERPL-MCNC: 1.9 MG/DL — SIGNIFICANT CHANGE UP (ref 1.6–2.6)
MCHC RBC-ENTMCNC: 28.5 PG — SIGNIFICANT CHANGE UP (ref 27–34)
MCHC RBC-ENTMCNC: 32.5 GM/DL — SIGNIFICANT CHANGE UP (ref 32–36)
MCV RBC AUTO: 87.7 FL — SIGNIFICANT CHANGE UP (ref 80–100)
MONOCYTES # BLD AUTO: 1 K/UL — HIGH (ref 0–0.9)
MONOCYTES NFR BLD AUTO: 8.2 % — SIGNIFICANT CHANGE UP (ref 2–14)
NEUTROPHILS # BLD AUTO: 9.96 K/UL — HIGH (ref 1.8–7.4)
NEUTROPHILS NFR BLD AUTO: 81.9 % — HIGH (ref 43–77)
NRBC # BLD: 0 /100 WBCS — SIGNIFICANT CHANGE UP (ref 0–0)
NT-PROBNP SERPL-SCNC: 463 PG/ML — HIGH (ref 0–125)
PLATELET # BLD AUTO: 149 K/UL — LOW (ref 150–400)
POTASSIUM SERPL-MCNC: 5.4 MMOL/L — HIGH (ref 3.5–5.3)
POTASSIUM SERPL-SCNC: 5.4 MMOL/L — HIGH (ref 3.5–5.3)
PROT SERPL-MCNC: 7.2 GM/DL — SIGNIFICANT CHANGE UP (ref 6–8.3)
RBC # BLD: 4.8 M/UL — SIGNIFICANT CHANGE UP (ref 4.2–5.8)
RBC # FLD: 12.7 % — SIGNIFICANT CHANGE UP (ref 10.3–14.5)
SARS-COV-2 RNA SPEC QL NAA+PROBE: SIGNIFICANT CHANGE UP
SODIUM SERPL-SCNC: 133 MMOL/L — LOW (ref 135–145)
TROPONIN I SERPL-MCNC: <.015 NG/ML — SIGNIFICANT CHANGE UP (ref 0.01–0.04)
WBC # BLD: 12.18 K/UL — HIGH (ref 3.8–10.5)
WBC # FLD AUTO: 12.18 K/UL — HIGH (ref 3.8–10.5)

## 2021-06-06 PROCEDURE — 71045 X-RAY EXAM CHEST 1 VIEW: CPT | Mod: 26

## 2021-06-06 PROCEDURE — 93010 ELECTROCARDIOGRAM REPORT: CPT

## 2021-06-06 PROCEDURE — 99285 EMERGENCY DEPT VISIT HI MDM: CPT

## 2021-06-06 RX ORDER — ASPIRIN/CALCIUM CARB/MAGNESIUM 324 MG
162 TABLET ORAL ONCE
Refills: 0 | Status: COMPLETED | OUTPATIENT
Start: 2021-06-06 | End: 2021-06-06

## 2021-06-06 RX ORDER — ACETAMINOPHEN 500 MG
650 TABLET ORAL ONCE
Refills: 0 | Status: COMPLETED | OUTPATIENT
Start: 2021-06-06 | End: 2021-06-06

## 2021-06-06 RX ORDER — DEXAMETHASONE 0.5 MG/5ML
10 ELIXIR ORAL ONCE
Refills: 0 | Status: DISCONTINUED | OUTPATIENT
Start: 2021-06-06 | End: 2021-06-06

## 2021-06-06 RX ORDER — INSULIN HUMAN 100 [IU]/ML
4 INJECTION, SOLUTION SUBCUTANEOUS ONCE
Refills: 0 | Status: COMPLETED | OUTPATIENT
Start: 2021-06-06 | End: 2021-06-06

## 2021-06-06 RX ORDER — AZITHROMYCIN 500 MG/1
500 TABLET, FILM COATED ORAL ONCE
Refills: 0 | Status: COMPLETED | OUTPATIENT
Start: 2021-06-06 | End: 2021-06-06

## 2021-06-06 RX ORDER — DEXAMETHASONE 0.5 MG/5ML
6 ELIXIR ORAL ONCE
Refills: 0 | Status: COMPLETED | OUTPATIENT
Start: 2021-06-06 | End: 2021-06-06

## 2021-06-06 RX ORDER — AZITHROMYCIN 500 MG/1
1 TABLET, FILM COATED ORAL
Qty: 3 | Refills: 0
Start: 2021-06-06 | End: 2021-06-08

## 2021-06-06 RX ORDER — SODIUM CHLORIDE 9 MG/ML
500 INJECTION INTRAMUSCULAR; INTRAVENOUS; SUBCUTANEOUS ONCE
Refills: 0 | Status: COMPLETED | OUTPATIENT
Start: 2021-06-06 | End: 2021-06-06

## 2021-06-06 RX ADMIN — Medication 6 MILLIGRAM(S): at 23:11

## 2021-06-06 RX ADMIN — INSULIN HUMAN 4 UNIT(S): 100 INJECTION, SOLUTION SUBCUTANEOUS at 22:34

## 2021-06-06 RX ADMIN — Medication 162 MILLIGRAM(S): at 21:53

## 2021-06-06 RX ADMIN — Medication 100 MILLIGRAM(S): at 21:53

## 2021-06-06 RX ADMIN — Medication 650 MILLIGRAM(S): at 22:58

## 2021-06-06 RX ADMIN — Medication 650 MILLIGRAM(S): at 22:28

## 2021-06-06 RX ADMIN — AZITHROMYCIN 500 MILLIGRAM(S): 500 TABLET, FILM COATED ORAL at 22:28

## 2021-06-06 RX ADMIN — SODIUM CHLORIDE 500 MILLILITER(S): 9 INJECTION INTRAMUSCULAR; INTRAVENOUS; SUBCUTANEOUS at 22:28

## 2021-06-06 NOTE — ED PROVIDER NOTE - NSFOLLOWUPINSTRUCTIONS_ED_ALL_ED_FT
Please  your prescriptions at your preferred pharmacy that you provided today and take as directed.    Viral Respiratory Infection    A viral respiratory infection is an illness that affects parts of the body used for breathing, like the lungs, nose, and throat. It is caused by a germ called a virus. Symptoms can include runny nose, coughing, sneezing, fatigue, body aches, sore throat, fever, or headache. Over the counter medicine can be used to manage the symptoms but the infection typically goes away on its own in 5 to 10 days.     SEEK IMMEDIATE MEDICAL CARE IF YOU HAVE ANY OF THE FOLLOWING SYMPTOMS: shortness of breath, chest pain, fever over 10 days, or lightheadedness/dizziness. Please  your prescriptions at your preferred pharmacy that you provided today and take as directed.    Follow up with your primary care doctor within 1-3 days. Repeat lab work to check potassium level and creatinine level. Return to ER right away if you are unable to urinate.     Viral Respiratory Infection    A viral respiratory infection is an illness that affects parts of the body used for breathing, like the lungs, nose, and throat. It is caused by a germ called a virus. Symptoms can include runny nose, coughing, sneezing, fatigue, body aches, sore throat, fever, or headache. Over the counter medicine can be used to manage the symptoms but the infection typically goes away on its own in 5 to 10 days.     SEEK IMMEDIATE MEDICAL CARE IF YOU HAVE ANY OF THE FOLLOWING SYMPTOMS: shortness of breath, chest pain, fever over 10 days, or lightheadedness/dizziness.

## 2021-06-06 NOTE — ED PROVIDER NOTE - PHYSICAL EXAMINATION
VITALS: reviewed  GEN: NAD, A & O x 4  HEAD/EYES: NCAT, PERRL, EOMI, anicteric sclerae, no conjunctival pallor  ENT: b/l tonsillar erythema, no exudates, no swelling, no cervical lymphadenopathy  RESP: lungs CTA with equal breath sounds bilaterally, chest wall nontender and atraumatic, +productive cough  CV: heart with reg rhythm S1, S2, no murmur; distal pulses intact and symmetric bilaterally  ABDOMEN: normoactive bowel sounds, soft, nondistended, nontender, no palpable masses  : no CVAT  MSK: extremities atraumatic and nontender, no edema, no asymmetry. the back is without midline or lateral tenderness, there is no spinal deformity or stepoff and the back is ranged painlessly. the neck has no midline tenderness, deformity, or stepoff, and is ranged painlessly.  SKIN: warm, dry, no rash, no bruising, no cyanosis. color appropriate for ethnicity  NEURO: alert, mentating appropriately, no facial asymmetry. gross sensation, motor, coordination are intact  PSYCH: Affect appropriate VITALS: reviewed  GEN: NAD, A & O x 4  HEAD/EYES: NCAT, PERRL, EOMI, anicteric sclerae, no conjunctival pallor  ENT: b/l tonsillar erythema, no exudates, no swelling, no cervical lymphadenopathy  RESP: lungs CTA with equal breath sounds bilaterally, chest wall nontender and atraumatic, +productive cough  CV: heart with reg rhythm S1, S2, distal pulses intact and symmetric bilaterally  ABDOMEN: normoactive bowel sounds, soft, nondistended, nontender, no palpable masses  : no CVAT  MSK: extremities atraumatic and nontender, no edema, no asymmetry. the back is without midline or lateral tenderness, there is no spinal deformity or stepoff and the back is ranged painlessly. the neck has no midline tenderness, deformity, or stepoff, and is ranged painlessly.  SKIN: warm, dry, no rash, no bruising, no cyanosis. color appropriate for ethnicity  NEURO: alert, mentating appropriately, no facial asymmetry. gross sensation, motor, coordination are intact  PSYCH: Affect appropriate

## 2021-06-06 NOTE — ED PROVIDER NOTE - CHIEF COMPLAINT
I will SWITCH the dose or number of times a day I take the medications listed below when I get home from the hospital:  None
The patient is a 75y Male complaining of cough.

## 2021-06-06 NOTE — ED PROVIDER NOTE - PATIENT PORTAL LINK FT
You can access the FollowMyHealth Patient Portal offered by [facility] by registering at the following website: [email or web address]. By joining [employer]’s FollowMyHealth portal, you will also be able to view your health information using other applications (apps) compatible with our system.

## 2021-06-06 NOTE — ED PROVIDER NOTE - NS ED ROS FT
ROS: negative for fever, headache, chest pain, shortness of breath, abd pain, nausea, vomiting, diarrhea, rash, paresthesia, and weakness--all other systems reviewed are negative. +cough, sore throat.

## 2021-06-06 NOTE — ED PROVIDER NOTE - CLINICAL SUMMARY MEDICAL DECISION MAKING FREE TEXT BOX
Obtain labs, x-ray, COVID swab, treat with tessalon pearls, Tylenol for pain, decadron for throat pain, likely d/c home. Pt well appearing, suspect viral URI.

## 2021-06-06 NOTE — ED ADULT NURSE NOTE - OBJECTIVE STATEMENT
pt presents to the ED c/o productive, cough, chest congestion, body aches, feeling feverish, and SOB when coughing.   denies sick contacts, denies recent travels, denies chills pt presents to the ED c/o productive cough, sore throat, chest congestion, body aches, feeling feverish, and SOB when coughing.   pt denies sick contacts, denies recent travels, denies chills, denies leg swelling, denies fever, and denies back pain, denies HA, denies dizziness, denies weakness., denies abd pain, denies n/v/d.

## 2021-06-06 NOTE — ED PROVIDER NOTE - OBJECTIVE STATEMENT
74 yo M w/PMH of HTN, DM presents with cough and sore throat. Denies CP or SOB, states it feels like something in his throat when he is trying to talk. Pt has received his covid vaccines. No recent travel, sick contacts or leg swelling. Denies fever/chills,

## 2021-06-07 RX ADMIN — SODIUM CHLORIDE 500 MILLILITER(S): 9 INJECTION INTRAMUSCULAR; INTRAVENOUS; SUBCUTANEOUS at 00:10

## 2021-06-21 ENCOUNTER — EMERGENCY (EMERGENCY)
Facility: HOSPITAL | Age: 76
LOS: 0 days | Discharge: HOME | End: 2021-06-21
Attending: EMERGENCY MEDICINE
Payer: MEDICARE

## 2021-06-21 VITALS
TEMPERATURE: 97 F | SYSTOLIC BLOOD PRESSURE: 156 MMHG | RESPIRATION RATE: 18 BRPM | DIASTOLIC BLOOD PRESSURE: 85 MMHG | HEART RATE: 93 BPM | OXYGEN SATURATION: 96 %

## 2021-06-21 VITALS
WEIGHT: 164.91 LBS | DIASTOLIC BLOOD PRESSURE: 65 MMHG | SYSTOLIC BLOOD PRESSURE: 99 MMHG | RESPIRATION RATE: 18 BRPM | OXYGEN SATURATION: 99 % | HEART RATE: 104 BPM | HEIGHT: 65 IN | TEMPERATURE: 97 F

## 2021-06-21 DIAGNOSIS — N39.0 URINARY TRACT INFECTION, SITE NOT SPECIFIED: ICD-10-CM

## 2021-06-21 DIAGNOSIS — E11.9 TYPE 2 DIABETES MELLITUS WITHOUT COMPLICATIONS: ICD-10-CM

## 2021-06-21 DIAGNOSIS — Z90.5 ACQUIRED ABSENCE OF KIDNEY: Chronic | ICD-10-CM

## 2021-06-21 DIAGNOSIS — I10 ESSENTIAL (PRIMARY) HYPERTENSION: ICD-10-CM

## 2021-06-21 LAB
ALBUMIN SERPL ELPH-MCNC: 2.6 G/DL — LOW (ref 3.3–5)
ALP SERPL-CCNC: 103 U/L — SIGNIFICANT CHANGE UP (ref 40–120)
ALT FLD-CCNC: 16 U/L — SIGNIFICANT CHANGE UP (ref 12–78)
ANION GAP SERPL CALC-SCNC: 5 MMOL/L — SIGNIFICANT CHANGE UP (ref 5–17)
APPEARANCE UR: CLEAR — SIGNIFICANT CHANGE UP
AST SERPL-CCNC: 8 U/L — LOW (ref 15–37)
BACTERIA # UR AUTO: ABNORMAL
BASOPHILS # BLD AUTO: 0 K/UL — SIGNIFICANT CHANGE UP (ref 0–0.2)
BASOPHILS NFR BLD AUTO: 0 % — SIGNIFICANT CHANGE UP (ref 0–2)
BILIRUB SERPL-MCNC: 0.8 MG/DL — SIGNIFICANT CHANGE UP (ref 0.2–1.2)
BILIRUB UR-MCNC: NEGATIVE — SIGNIFICANT CHANGE UP
BUN SERPL-MCNC: 28 MG/DL — HIGH (ref 7–23)
CALCIUM SERPL-MCNC: 9.3 MG/DL — SIGNIFICANT CHANGE UP (ref 8.5–10.1)
CHLORIDE SERPL-SCNC: 101 MMOL/L — SIGNIFICANT CHANGE UP (ref 96–108)
CO2 SERPL-SCNC: 27 MMOL/L — SIGNIFICANT CHANGE UP (ref 22–31)
COLOR SPEC: YELLOW — SIGNIFICANT CHANGE UP
CREAT SERPL-MCNC: 2.05 MG/DL — HIGH (ref 0.5–1.3)
DIFF PNL FLD: ABNORMAL
EOSINOPHIL # BLD AUTO: 0 K/UL — SIGNIFICANT CHANGE UP (ref 0–0.5)
EOSINOPHIL NFR BLD AUTO: 0 % — SIGNIFICANT CHANGE UP (ref 0–6)
EPI CELLS # UR: SIGNIFICANT CHANGE UP
GLUCOSE BLDC GLUCOMTR-MCNC: 235 MG/DL — HIGH (ref 70–99)
GLUCOSE SERPL-MCNC: 275 MG/DL — HIGH (ref 70–99)
GLUCOSE UR QL: 50 MG/DL
HCT VFR BLD CALC: 40.3 % — SIGNIFICANT CHANGE UP (ref 39–50)
HGB BLD-MCNC: 13.3 G/DL — SIGNIFICANT CHANGE UP (ref 13–17)
KETONES UR-MCNC: ABNORMAL
LACTATE SERPL-SCNC: 1 MMOL/L — SIGNIFICANT CHANGE UP (ref 0.7–2)
LEUKOCYTE ESTERASE UR-ACNC: ABNORMAL
LIDOCAIN IGE QN: 113 U/L — SIGNIFICANT CHANGE UP (ref 73–393)
LYMPHOCYTES # BLD AUTO: 0.77 K/UL — LOW (ref 1–3.3)
LYMPHOCYTES # BLD AUTO: 3 % — LOW (ref 13–44)
MANUAL SMEAR VERIFICATION: SIGNIFICANT CHANGE UP
MCHC RBC-ENTMCNC: 28.7 PG — SIGNIFICANT CHANGE UP (ref 27–34)
MCHC RBC-ENTMCNC: 33 GM/DL — SIGNIFICANT CHANGE UP (ref 32–36)
MCV RBC AUTO: 87 FL — SIGNIFICANT CHANGE UP (ref 80–100)
MONOCYTES # BLD AUTO: 0.51 K/UL — SIGNIFICANT CHANGE UP (ref 0–0.9)
MONOCYTES NFR BLD AUTO: 2 % — SIGNIFICANT CHANGE UP (ref 2–14)
NEUTROPHILS # BLD AUTO: 24.24 K/UL — HIGH (ref 1.8–7.4)
NEUTROPHILS NFR BLD AUTO: 94 % — HIGH (ref 43–77)
NEUTS BAND # BLD: 1 % — SIGNIFICANT CHANGE UP (ref 0–8)
NITRITE UR-MCNC: NEGATIVE — SIGNIFICANT CHANGE UP
NRBC # BLD: 0 /100 — SIGNIFICANT CHANGE UP (ref 0–0)
NRBC # BLD: SIGNIFICANT CHANGE UP /100 WBCS (ref 0–0)
PH UR: 5 — SIGNIFICANT CHANGE UP (ref 5–8)
PLAT MORPH BLD: NORMAL — SIGNIFICANT CHANGE UP
PLATELET # BLD AUTO: 137 K/UL — LOW (ref 150–400)
POTASSIUM SERPL-MCNC: 5.6 MMOL/L — HIGH (ref 3.5–5.3)
POTASSIUM SERPL-SCNC: 5.6 MMOL/L — HIGH (ref 3.5–5.3)
PROT SERPL-MCNC: 7 GM/DL — SIGNIFICANT CHANGE UP (ref 6–8.3)
PROT UR-MCNC: 100 MG/DL
RBC # BLD: 4.63 M/UL — SIGNIFICANT CHANGE UP (ref 4.2–5.8)
RBC # FLD: 13.2 % — SIGNIFICANT CHANGE UP (ref 10.3–14.5)
RBC BLD AUTO: NORMAL — SIGNIFICANT CHANGE UP
RBC CASTS # UR COMP ASSIST: ABNORMAL /HPF (ref 0–4)
SODIUM SERPL-SCNC: 133 MMOL/L — LOW (ref 135–145)
SP GR SPEC: 1.01 — SIGNIFICANT CHANGE UP (ref 1.01–1.02)
UROBILINOGEN FLD QL: NEGATIVE MG/DL — SIGNIFICANT CHANGE UP
WBC # BLD: 25.52 K/UL — HIGH (ref 3.8–10.5)
WBC # FLD AUTO: 25.52 K/UL — HIGH (ref 3.8–10.5)
WBC UR QL: >50

## 2021-06-21 PROCEDURE — 71045 X-RAY EXAM CHEST 1 VIEW: CPT | Mod: 26

## 2021-06-21 PROCEDURE — 99284 EMERGENCY DEPT VISIT MOD MDM: CPT

## 2021-06-21 RX ORDER — CEPHALEXIN 500 MG
1 CAPSULE ORAL
Qty: 20 | Refills: 0
Start: 2021-06-21 | End: 2021-06-30

## 2021-06-21 RX ORDER — SODIUM CHLORIDE 9 MG/ML
2500 INJECTION INTRAMUSCULAR; INTRAVENOUS; SUBCUTANEOUS ONCE
Refills: 0 | Status: COMPLETED | OUTPATIENT
Start: 2021-06-21 | End: 2021-06-21

## 2021-06-21 RX ORDER — CEFTRIAXONE 500 MG/1
1000 INJECTION, POWDER, FOR SOLUTION INTRAMUSCULAR; INTRAVENOUS ONCE
Refills: 0 | Status: COMPLETED | OUTPATIENT
Start: 2021-06-21 | End: 2021-06-21

## 2021-06-21 RX ADMIN — SODIUM CHLORIDE 2500 MILLILITER(S): 9 INJECTION INTRAMUSCULAR; INTRAVENOUS; SUBCUTANEOUS at 15:10

## 2021-06-21 RX ADMIN — CEFTRIAXONE 100 MILLIGRAM(S): 500 INJECTION, POWDER, FOR SOLUTION INTRAMUSCULAR; INTRAVENOUS at 15:09

## 2021-06-21 RX ADMIN — Medication 100 MILLIGRAM(S): at 13:50

## 2021-06-21 NOTE — ED ADULT NURSE NOTE - OBJECTIVE STATEMENT
Patient received with complaints of urinary urgency , voiced PMx enlarged prostate, DM. Pt states he's been having urinary urgency for the past 2-3 days. Pt also complaints of pain to frontal and anus when he coughs.

## 2021-06-21 NOTE — ED ADULT NURSE NOTE - NSIMPLEMENTINTERV_GEN_ALL_ED
Implemented All Universal Safety Interventions:  [place] to call system. Call bell, personal items and telephone within reach. Instruct patient to call for assistance. Room bathroom lighting operational. Non-slip footwear when patient is off stretcher. Physically safe environment: no spills, clutter or unnecessary equipment. Stretcher in lowest position, wheels locked, appropriate side rails in place.

## 2021-06-21 NOTE — ED PROVIDER NOTE - PATIENT PORTAL LINK FT
You can access the FollowMyHealth Patient Portal offered by [facility] by registering at the following website: [email or web address]. By joining [employer]’s FollowMyHealth portal, you will also be able to view your health information using other applications (apps) compatible with our system.

## 2021-06-21 NOTE — ED PROVIDER NOTE - CLINICAL SUMMARY MEDICAL DECISION MAKING FREE TEXT BOX
Urinary dysuria and urgency arrived with mild tachycardia and afebrile no abdominal tenderness r/o UTI.  Will check UA also check labs.  Patient with cough r/o PNA/pleural effusion.  Will check Xray and give medication for cough.

## 2021-06-21 NOTE — ED PROVIDER NOTE - NSFOLLOWUPINSTRUCTIONS_ED_ALL_ED_FT
Take prescription medication as instructed.  Follow-up with your primary care doctor.  If you develop fever. vomiting with inability to take medication or water/liquids for hydration or any other worrisome symptoms return to the ER.

## 2021-06-21 NOTE — ED ADULT NURSE REASSESSMENT NOTE - NS ED NURSE REASSESS COMMENT FT1
Patient -re-evaluated by MD, voiced feeling better. Pt and son informed of result, verbalizes understanding. Prescriptions sent to pharmacy of choice, pt left with son in stable condition.

## 2021-06-21 NOTE — ED ADULT TRIAGE NOTE - CHIEF COMPLAINT QUOTE
pt c/o urinary incontinence for 3 days with non productive coughing, decrease appetite. Pt states he was diagnose with pneumonia 2 weeks ago. pt c/o generalize body aches

## 2021-06-21 NOTE — ED PROVIDER NOTE - OBJECTIVE STATEMENT
This patient is a 75 year old man hx of HTN who presents to the ER c/o dysuria and urgency x 3 days. This patient is a 75 year old man hx of HTN who presents to the ER c/o dysuria and urgency x 3 days.  He states that when he finally gets to the bathroom he has already started urinating on himself.  He denies hematuria or fever.  He also denies abdominal pain, nausea, and vomiting.  He also reports cough for over 2 weeks.  He was seen in the ER 2 weeks ago and given a 3 day course of antibiotics.  Patient states that the cough has decreased but is still present.  He denies chest pain and SOB.  He does report body aches.  No sick contacts.  Patient reports that he was fully vaccinated as of end of may.

## 2021-06-23 LAB
-  AMIKACIN: SIGNIFICANT CHANGE UP
-  AMOXICILLIN/CLAVULANIC ACID: SIGNIFICANT CHANGE UP
-  AMPICILLIN/SULBACTAM: SIGNIFICANT CHANGE UP
-  AMPICILLIN: SIGNIFICANT CHANGE UP
-  AZTREONAM: SIGNIFICANT CHANGE UP
-  CEFAZOLIN: SIGNIFICANT CHANGE UP
-  CEFEPIME: SIGNIFICANT CHANGE UP
-  CEFOXITIN: SIGNIFICANT CHANGE UP
-  CEFTRIAXONE: SIGNIFICANT CHANGE UP
-  CIPROFLOXACIN: SIGNIFICANT CHANGE UP
-  ERTAPENEM: SIGNIFICANT CHANGE UP
-  GENTAMICIN: SIGNIFICANT CHANGE UP
-  LEVOFLOXACIN: SIGNIFICANT CHANGE UP
-  MEROPENEM: SIGNIFICANT CHANGE UP
-  NITROFURANTOIN: SIGNIFICANT CHANGE UP
-  PIPERACILLIN/TAZOBACTAM: SIGNIFICANT CHANGE UP
-  TOBRAMYCIN: SIGNIFICANT CHANGE UP
-  TRIMETHOPRIM/SULFAMETHOXAZOLE: SIGNIFICANT CHANGE UP
METHOD TYPE: SIGNIFICANT CHANGE UP

## 2021-06-24 LAB
CULTURE RESULTS: SIGNIFICANT CHANGE UP
ORGANISM # SPEC MICROSCOPIC CNT: SIGNIFICANT CHANGE UP
ORGANISM # SPEC MICROSCOPIC CNT: SIGNIFICANT CHANGE UP
SPECIMEN SOURCE: SIGNIFICANT CHANGE UP

## 2021-06-29 ENCOUNTER — APPOINTMENT (OUTPATIENT)
Dept: UROLOGY | Facility: CLINIC | Age: 76
End: 2021-06-29
Payer: MEDICARE

## 2021-06-29 VITALS
HEART RATE: 88 BPM | DIASTOLIC BLOOD PRESSURE: 98 MMHG | RESPIRATION RATE: 15 BRPM | WEIGHT: 165 LBS | TEMPERATURE: 97.6 F | HEIGHT: 65 IN | SYSTOLIC BLOOD PRESSURE: 146 MMHG | OXYGEN SATURATION: 97 % | BODY MASS INDEX: 27.49 KG/M2

## 2021-06-29 PROCEDURE — 99203 OFFICE O/P NEW LOW 30 MIN: CPT

## 2021-06-29 PROCEDURE — 99072 ADDL SUPL MATRL&STAF TM PHE: CPT

## 2021-06-29 RX ORDER — GABAPENTIN 300 MG/1
300 CAPSULE ORAL TWICE DAILY
Refills: 0 | Status: DISCONTINUED | COMMUNITY
End: 2021-06-29

## 2021-07-07 ENCOUNTER — APPOINTMENT (OUTPATIENT)
Dept: UROLOGY | Facility: CLINIC | Age: 76
End: 2021-07-07
Payer: MEDICARE

## 2021-07-07 ENCOUNTER — NON-APPOINTMENT (OUTPATIENT)
Age: 76
End: 2021-07-07

## 2021-07-07 VITALS
BODY MASS INDEX: 27.49 KG/M2 | WEIGHT: 165 LBS | HEIGHT: 65 IN | TEMPERATURE: 97.1 F | DIASTOLIC BLOOD PRESSURE: 89 MMHG | HEART RATE: 101 BPM | SYSTOLIC BLOOD PRESSURE: 130 MMHG

## 2021-07-07 DIAGNOSIS — Z78.9 OTHER SPECIFIED HEALTH STATUS: ICD-10-CM

## 2021-07-07 DIAGNOSIS — Z84.1 FAMILY HISTORY OF DISORDERS OF KIDNEY AND URETER: ICD-10-CM

## 2021-07-07 DIAGNOSIS — N40.0 BENIGN PROSTATIC HYPERPLASIA WITHOUT LOWER URINARY TRACT SYMPMS: ICD-10-CM

## 2021-07-07 PROCEDURE — 51798 US URINE CAPACITY MEASURE: CPT

## 2021-07-07 PROCEDURE — 99215 OFFICE O/P EST HI 40 MIN: CPT

## 2021-07-07 PROCEDURE — 99072 ADDL SUPL MATRL&STAF TM PHE: CPT

## 2021-07-07 NOTE — REVIEW OF SYSTEMS
[see HPI] : see HPI [Incontinence] : incontinence [Hesitancy] : urinary hesitancy [Nocturia] : nocturia [Negative] : Heme/Lymph [Dysuria] : no dysuria [Genital Lesion] : no genital lesions [Testicular Pain] : no testicular pain

## 2021-07-07 NOTE — PHYSICAL EXAM
[General Appearance - Well Developed] : well developed [General Appearance - Well Nourished] : well nourished [Normal Appearance] : normal appearance [Well Groomed] : well groomed [General Appearance - In No Acute Distress] : no acute distress [Edema] : no peripheral edema [Respiration, Rhythm And Depth] : normal respiratory rhythm and effort [Exaggerated Use Of Accessory Muscles For Inspiration] : no accessory muscle use [Abdomen Soft] : soft [Abdomen Tenderness] : non-tender [Costovertebral Angle Tenderness] : no ~M costovertebral angle tenderness [] : no rash [No Focal Deficits] : no focal deficits [Oriented To Time, Place, And Person] : oriented to person, place, and time [Affect] : the affect was normal [Mood] : the mood was normal [Not Anxious] : not anxious [Normal Station and Gait] : the gait and station were normal for the patient's age [No Palpable Adenopathy] : no palpable adenopathy [Urethral Meatus] : meatus normal [Penis Abnormality] : normal uncircumcised penis [Urinary Bladder Findings] : the bladder was normal on palpation [Scrotum] : the scrotum was normal [Rectal Exam - Seminal Vesicles] : the seminal vesicles were normal [Epididymis] : the epididymides were normal [Testes Tenderness] : no tenderness of the testes [Testes Mass (___cm)] : there were no testicular masses [FreeTextEntry1] : marked balanoposthitis

## 2021-07-07 NOTE — ADDENDUM
[FreeTextEntry1] : I, Dinah Hutchison, acted solely as a scribe for Dr. Bandar Macias on this date 07/07/2021.\par \par All medical record entries made by the Scribe were at my, Dr. Bandar Macias, direction and personally dictated by me on 07/07/2021. I have reviewed the chart and agree that the record accurately reflects my personal performance of the history, physical exam, assessment and plan.  I have also personally directed, reviewed and agreed with the chart.

## 2021-07-08 LAB
ANION GAP SERPL CALC-SCNC: 11 MMOL/L
APPEARANCE: CLEAR
BACTERIA: NEGATIVE
BASOPHILS # BLD AUTO: 0.05 K/UL
BASOPHILS NFR BLD AUTO: 0.6 %
BILIRUBIN URINE: NEGATIVE
BLOOD URINE: NEGATIVE
BUN SERPL-MCNC: 18 MG/DL
CALCIUM SERPL-MCNC: 10.4 MG/DL
CHLORIDE SERPL-SCNC: 102 MMOL/L
CO2 SERPL-SCNC: 25 MMOL/L
COLOR: NORMAL
CREAT SERPL-MCNC: 1.68 MG/DL
EOSINOPHIL # BLD AUTO: 0.08 K/UL
EOSINOPHIL NFR BLD AUTO: 1 %
GLUCOSE QUALITATIVE U: ABNORMAL
GLUCOSE SERPL-MCNC: 102 MG/DL
HCT VFR BLD CALC: 43.9 %
HGB BLD-MCNC: 13.8 G/DL
HYALINE CASTS: 1 /LPF
IMM GRANULOCYTES NFR BLD AUTO: 0.1 %
KETONES URINE: NEGATIVE
LEUKOCYTE ESTERASE URINE: ABNORMAL
LYMPHOCYTES # BLD AUTO: 2.22 K/UL
LYMPHOCYTES NFR BLD AUTO: 28.5 %
MAN DIFF?: NORMAL
MCHC RBC-ENTMCNC: 29 PG
MCHC RBC-ENTMCNC: 31.4 GM/DL
MCV RBC AUTO: 92.2 FL
MICROSCOPIC-UA: NORMAL
MONOCYTES # BLD AUTO: 0.75 K/UL
MONOCYTES NFR BLD AUTO: 9.6 %
NEUTROPHILS # BLD AUTO: 4.69 K/UL
NEUTROPHILS NFR BLD AUTO: 60.2 %
NITRITE URINE: NEGATIVE
PH URINE: 6.5
PLATELET # BLD AUTO: 239 K/UL
POTASSIUM SERPL-SCNC: 5.4 MMOL/L
PROTEIN URINE: ABNORMAL
RBC # BLD: 4.76 M/UL
RBC # FLD: 13.7 %
RED BLOOD CELLS URINE: 3 /HPF
SODIUM SERPL-SCNC: 138 MMOL/L
SPECIFIC GRAVITY URINE: 1.01
SQUAMOUS EPITHELIAL CELLS: 3 /HPF
UROBILINOGEN URINE: NORMAL
WBC # FLD AUTO: 7.8 K/UL
WHITE BLOOD CELLS URINE: 6 /HPF

## 2021-07-21 ENCOUNTER — TRANSCRIPTION ENCOUNTER (OUTPATIENT)
Age: 76
End: 2021-07-21

## 2021-08-09 LAB — BACTERIA UR CULT: NORMAL

## 2021-08-16 ENCOUNTER — APPOINTMENT (OUTPATIENT)
Dept: UROLOGY | Facility: CLINIC | Age: 76
End: 2021-08-16
Payer: MEDICARE

## 2021-08-16 VITALS — HEART RATE: 62 BPM | TEMPERATURE: 98.3 F | DIASTOLIC BLOOD PRESSURE: 75 MMHG | SYSTOLIC BLOOD PRESSURE: 125 MMHG

## 2021-08-16 PROCEDURE — 51798 US URINE CAPACITY MEASURE: CPT

## 2021-08-16 PROCEDURE — 99214 OFFICE O/P EST MOD 30 MIN: CPT

## 2021-08-16 RX ORDER — CEPHALEXIN 500 MG/1
500 CAPSULE ORAL
Qty: 20 | Refills: 0 | Status: DISCONTINUED | COMMUNITY
Start: 2021-06-21 | End: 2021-08-16

## 2021-08-16 RX ORDER — LEVOCETIRIZINE DIHYDROCHLORIDE 5 MG/1
5 TABLET ORAL DAILY
Refills: 0 | Status: DISCONTINUED | COMMUNITY
Start: 2021-06-29 | End: 2021-08-16

## 2021-08-16 RX ORDER — ASPIRIN ENTERIC COATED TABLETS 81 MG 81 MG/1
81 TABLET, DELAYED RELEASE ORAL DAILY
Refills: 0 | Status: DISCONTINUED | COMMUNITY
End: 2021-08-16

## 2021-08-16 RX ORDER — AZITHROMYCIN 500 MG/1
500 TABLET, FILM COATED ORAL
Qty: 3 | Refills: 0 | Status: DISCONTINUED | COMMUNITY
Start: 2021-06-06 | End: 2021-08-16

## 2021-08-16 RX ORDER — GABAPENTIN 400 MG/1
400 CAPSULE ORAL
Refills: 0 | Status: DISCONTINUED | COMMUNITY
Start: 2021-06-29 | End: 2021-08-16

## 2021-08-16 RX ORDER — BLOOD-GLUCOSE METER
70 EACH MISCELLANEOUS
Qty: 200 | Refills: 0 | Status: DISCONTINUED | COMMUNITY
Start: 2020-08-10 | End: 2021-08-16

## 2021-08-17 LAB
ANION GAP SERPL CALC-SCNC: 9 MMOL/L
APPEARANCE: CLEAR
BACTERIA: NEGATIVE
BILIRUBIN URINE: NEGATIVE
BLOOD URINE: NEGATIVE
BUN SERPL-MCNC: 30 MG/DL
CALCIUM SERPL-MCNC: 9.7 MG/DL
CHLORIDE SERPL-SCNC: 102 MMOL/L
CO2 SERPL-SCNC: 23 MMOL/L
COLOR: NORMAL
CREAT SERPL-MCNC: 1.71 MG/DL
GLUCOSE QUALITATIVE U: ABNORMAL
GLUCOSE SERPL-MCNC: 209 MG/DL
HYALINE CASTS: 0 /LPF
KETONES URINE: NEGATIVE
LEUKOCYTE ESTERASE URINE: NEGATIVE
MICROSCOPIC-UA: NORMAL
NITRITE URINE: NEGATIVE
PH URINE: 6.5
POTASSIUM SERPL-SCNC: 5.7 MMOL/L
PROTEIN URINE: ABNORMAL
PSA SERPL-MCNC: 1.72 NG/ML
RED BLOOD CELLS URINE: 1 /HPF
SODIUM SERPL-SCNC: 134 MMOL/L
SPECIFIC GRAVITY URINE: 1.02
SQUAMOUS EPITHELIAL CELLS: 0 /HPF
UROBILINOGEN URINE: NORMAL
WHITE BLOOD CELLS URINE: 0 /HPF

## 2021-08-17 NOTE — HISTORY OF PRESENT ILLNESS
[FreeTextEntry1] : 07/07/2021: Mr. Gomez is a 76 y/o M presenting today for initial evaluation of urinary tract infection. Daytime frequency x 5. N x 1. No fever, but sometimes feels mild chills. Admits sometimes feeling mild burning. Finished taking Cephalexin 500 mg PO BID, taking some other antibiotics , should finish in a day or two. Has h/o diabetes, kidney stones, renal insufficiency. \par \par PVR: 171 cc after two hours. \par \par O/E: uncircumcised penis. balanitis. inflamed foreskin. \par \par Balanitis: Practice foreskin hygiene. We will start Nystatin skin ointment. \par \par RTO in 1 month for PVR and uroflow. \par \par 08/16/2021: Mr. GOMEZ is a 75 year male who presents today for a follow up for solitary kidney. He is doing well. N x 1-2. Daytime frequency x 3-4. He denies hematuria, dysuria, urgency and hesitancy. \par \par Labs on 07/08/2021: BUN: 18 mg/dL; Creatinine: 1.68 mg/dl; UA: proteinuria, glycosuria; Culture: negative\par Uroflow not done as pt could not void; PVR at random 87 cc\par \par O/E: balanitis much improved; uncircumcised phallus, adequate meatus, both testes descended, nontender, no mass palpable\par BLAKE: deferred\par \par On Tamsulosin and Finasteride; will continue \par \par Will get PSA and BMP \par Follow up in 3 months to check PSA, BMP, uroflow and bladder scan

## 2021-08-17 NOTE — ADDENDUM
[FreeTextEntry1] : I, Jo Blackocent , acted solely as a scribe for Dr. Bandar Macias on this date, 08/16/2021.\par \par All medical record entries made by the Scribe were at my Dr. Bandar Macias direction and personally dictated by me on, 08/16/2021. I have reviewed the chart and agree that the record accurately reflects my personal performance of the history, physical exam, assessment and plan. I have also personally directed, reviewed and agreed with the chart.

## 2021-08-17 NOTE — PHYSICAL EXAM
[General Appearance - Well Developed] : well developed [General Appearance - Well Nourished] : well nourished [Normal Appearance] : normal appearance [Well Groomed] : well groomed [General Appearance - In No Acute Distress] : no acute distress [Abdomen Soft] : soft [Abdomen Tenderness] : non-tender [Costovertebral Angle Tenderness] : no ~M costovertebral angle tenderness [Urethral Meatus] : meatus normal [Penis Abnormality] : normal uncircumcised penis [Urinary Bladder Findings] : the bladder was normal on palpation [Scrotum] : the scrotum was normal [Rectal Exam - Seminal Vesicles] : the seminal vesicles were normal [Epididymis] : the epididymides were normal [Testes Tenderness] : no tenderness of the testes [Testes Mass (___cm)] : there were no testicular masses [Edema] : no peripheral edema [] : no respiratory distress [Respiration, Rhythm And Depth] : normal respiratory rhythm and effort [Exaggerated Use Of Accessory Muscles For Inspiration] : no accessory muscle use [Oriented To Time, Place, And Person] : oriented to person, place, and time [Affect] : the affect was normal [Mood] : the mood was normal [Not Anxious] : not anxious [Normal Station and Gait] : the gait and station were normal for the patient's age [No Focal Deficits] : no focal deficits [No Palpable Adenopathy] : no palpable adenopathy [FreeTextEntry1] : marked balanoposthitis; much improved; BLAKE: deferred

## 2021-08-19 LAB — BACTERIA UR CULT: NORMAL
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
## 2021-09-02 ENCOUNTER — EMERGENCY (EMERGENCY)
Facility: HOSPITAL | Age: 76
LOS: 0 days | Discharge: HOME | End: 2021-09-02
Attending: EMERGENCY MEDICINE
Payer: MEDICARE

## 2021-09-02 VITALS
RESPIRATION RATE: 18 BRPM | DIASTOLIC BLOOD PRESSURE: 95 MMHG | WEIGHT: 164.91 LBS | HEART RATE: 78 BPM | SYSTOLIC BLOOD PRESSURE: 172 MMHG | HEIGHT: 65 IN | OXYGEN SATURATION: 99 % | TEMPERATURE: 98 F

## 2021-09-02 VITALS
HEART RATE: 94 BPM | SYSTOLIC BLOOD PRESSURE: 156 MMHG | TEMPERATURE: 98 F | OXYGEN SATURATION: 97 % | RESPIRATION RATE: 17 BRPM | DIASTOLIC BLOOD PRESSURE: 100 MMHG

## 2021-09-02 DIAGNOSIS — R79.89 OTHER SPECIFIED ABNORMAL FINDINGS OF BLOOD CHEMISTRY: ICD-10-CM

## 2021-09-02 DIAGNOSIS — I12.9 HYPERTENSIVE CHRONIC KIDNEY DISEASE WITH STAGE 1 THROUGH STAGE 4 CHRONIC KIDNEY DISEASE, OR UNSPECIFIED CHRONIC KIDNEY DISEASE: ICD-10-CM

## 2021-09-02 DIAGNOSIS — E11.22 TYPE 2 DIABETES MELLITUS WITH DIABETIC CHRONIC KIDNEY DISEASE: ICD-10-CM

## 2021-09-02 DIAGNOSIS — Z00.00 ENCOUNTER FOR GENERAL ADULT MEDICAL EXAMINATION WITHOUT ABNORMAL FINDINGS: ICD-10-CM

## 2021-09-02 DIAGNOSIS — R42 DIZZINESS AND GIDDINESS: ICD-10-CM

## 2021-09-02 DIAGNOSIS — Z90.5 ACQUIRED ABSENCE OF KIDNEY: Chronic | ICD-10-CM

## 2021-09-02 DIAGNOSIS — E78.5 HYPERLIPIDEMIA, UNSPECIFIED: ICD-10-CM

## 2021-09-02 DIAGNOSIS — N18.9 CHRONIC KIDNEY DISEASE, UNSPECIFIED: ICD-10-CM

## 2021-09-02 LAB
ALBUMIN SERPL ELPH-MCNC: 2.9 G/DL — LOW (ref 3.3–5)
ALP SERPL-CCNC: 92 U/L — SIGNIFICANT CHANGE UP (ref 40–120)
ALT FLD-CCNC: 23 U/L — SIGNIFICANT CHANGE UP (ref 12–78)
ANION GAP SERPL CALC-SCNC: 4 MMOL/L — LOW (ref 5–17)
APPEARANCE UR: CLEAR — SIGNIFICANT CHANGE UP
AST SERPL-CCNC: 20 U/L — SIGNIFICANT CHANGE UP (ref 15–37)
BACTERIA # UR AUTO: ABNORMAL
BASOPHILS # BLD AUTO: 0.04 K/UL — SIGNIFICANT CHANGE UP (ref 0–0.2)
BASOPHILS NFR BLD AUTO: 0.5 % — SIGNIFICANT CHANGE UP (ref 0–2)
BILIRUB SERPL-MCNC: 0.3 MG/DL — SIGNIFICANT CHANGE UP (ref 0.2–1.2)
BILIRUB UR-MCNC: NEGATIVE — SIGNIFICANT CHANGE UP
BUN SERPL-MCNC: 30 MG/DL — HIGH (ref 7–23)
CALCIUM SERPL-MCNC: 9.6 MG/DL — SIGNIFICANT CHANGE UP (ref 8.5–10.1)
CHLORIDE SERPL-SCNC: 106 MMOL/L — SIGNIFICANT CHANGE UP (ref 96–108)
CO2 SERPL-SCNC: 26 MMOL/L — SIGNIFICANT CHANGE UP (ref 22–31)
COLOR SPEC: YELLOW — SIGNIFICANT CHANGE UP
CREAT SERPL-MCNC: 1.73 MG/DL — HIGH (ref 0.5–1.3)
DIFF PNL FLD: ABNORMAL
EOSINOPHIL # BLD AUTO: 0.18 K/UL — SIGNIFICANT CHANGE UP (ref 0–0.5)
EOSINOPHIL NFR BLD AUTO: 2 % — SIGNIFICANT CHANGE UP (ref 0–6)
EPI CELLS # UR: SIGNIFICANT CHANGE UP
GLUCOSE SERPL-MCNC: 266 MG/DL — HIGH (ref 70–99)
GLUCOSE UR QL: 250 MG/DL
HCT VFR BLD CALC: 42.3 % — SIGNIFICANT CHANGE UP (ref 39–50)
HGB BLD-MCNC: 13.9 G/DL — SIGNIFICANT CHANGE UP (ref 13–17)
IMM GRANULOCYTES NFR BLD AUTO: 0.1 % — SIGNIFICANT CHANGE UP (ref 0–1.5)
KETONES UR-MCNC: NEGATIVE — SIGNIFICANT CHANGE UP
LEUKOCYTE ESTERASE UR-ACNC: NEGATIVE — SIGNIFICANT CHANGE UP
LYMPHOCYTES # BLD AUTO: 1.82 K/UL — SIGNIFICANT CHANGE UP (ref 1–3.3)
LYMPHOCYTES # BLD AUTO: 20.6 % — SIGNIFICANT CHANGE UP (ref 13–44)
MAGNESIUM SERPL-MCNC: 2.2 MG/DL — SIGNIFICANT CHANGE UP (ref 1.6–2.6)
MCHC RBC-ENTMCNC: 29 PG — SIGNIFICANT CHANGE UP (ref 27–34)
MCHC RBC-ENTMCNC: 32.9 GM/DL — SIGNIFICANT CHANGE UP (ref 32–36)
MCV RBC AUTO: 88.3 FL — SIGNIFICANT CHANGE UP (ref 80–100)
MONOCYTES # BLD AUTO: 0.77 K/UL — SIGNIFICANT CHANGE UP (ref 0–0.9)
MONOCYTES NFR BLD AUTO: 8.7 % — SIGNIFICANT CHANGE UP (ref 2–14)
NEUTROPHILS # BLD AUTO: 6 K/UL — SIGNIFICANT CHANGE UP (ref 1.8–7.4)
NEUTROPHILS NFR BLD AUTO: 68.1 % — SIGNIFICANT CHANGE UP (ref 43–77)
NITRITE UR-MCNC: NEGATIVE — SIGNIFICANT CHANGE UP
NRBC # BLD: 0 /100 WBCS — SIGNIFICANT CHANGE UP (ref 0–0)
PH UR: 6.5 — SIGNIFICANT CHANGE UP (ref 5–8)
PLATELET # BLD AUTO: 151 K/UL — SIGNIFICANT CHANGE UP (ref 150–400)
POTASSIUM SERPL-MCNC: 4.5 MMOL/L — SIGNIFICANT CHANGE UP (ref 3.5–5.3)
POTASSIUM SERPL-SCNC: 4.5 MMOL/L — SIGNIFICANT CHANGE UP (ref 3.5–5.3)
PROT SERPL-MCNC: 7.1 GM/DL — SIGNIFICANT CHANGE UP (ref 6–8.3)
PROT UR-MCNC: 30 MG/DL
RBC # BLD: 4.79 M/UL — SIGNIFICANT CHANGE UP (ref 4.2–5.8)
RBC # FLD: 13.2 % — SIGNIFICANT CHANGE UP (ref 10.3–14.5)
RBC CASTS # UR COMP ASSIST: ABNORMAL /HPF (ref 0–4)
SODIUM SERPL-SCNC: 136 MMOL/L — SIGNIFICANT CHANGE UP (ref 135–145)
SP GR SPEC: 1.01 — SIGNIFICANT CHANGE UP (ref 1.01–1.02)
UROBILINOGEN FLD QL: NEGATIVE MG/DL — SIGNIFICANT CHANGE UP
WBC # BLD: 8.82 K/UL — SIGNIFICANT CHANGE UP (ref 3.8–10.5)
WBC # FLD AUTO: 8.82 K/UL — SIGNIFICANT CHANGE UP (ref 3.8–10.5)
WBC UR QL: SIGNIFICANT CHANGE UP

## 2021-09-02 PROCEDURE — 71045 X-RAY EXAM CHEST 1 VIEW: CPT | Mod: 26

## 2021-09-02 PROCEDURE — 99284 EMERGENCY DEPT VISIT MOD MDM: CPT

## 2021-09-02 PROCEDURE — 93010 ELECTROCARDIOGRAM REPORT: CPT

## 2021-09-02 NOTE — ED PROVIDER NOTE - PHYSICAL EXAMINATION
Gen: Alert, Well appearing. NAD    Head: NC, AT, PERRL, normal lids/conjunctiva   ENT: patent oropharynx without erythema/exudate, uvula midline  Neck: supple, no tenderness/meningismus  Pulm: Bilateral clear BS, normal resp effort  CV: RRR, no M/R/G, +dist pulses   Abd: soft, NT/ND, +BS, no guarding/rebound tenderness  Mskel: extremities x4 with normal ROM. no ctl spine ttp. no edema/erythema/cyanosis   Skin: no rash, no bruising  Neuro: AAOx3, no sensory/motor deficits, CN 2-12 intact

## 2021-09-02 NOTE — ED ADULT NURSE NOTE - NSFALLRSKOUTCOME_ED_ALL_ED
Patient is from [facility] she is here for a fall/increase swelling to her lower extremities and fatigue  She doesn't know why she fell.  History of Dementia Universal Safety Interventions

## 2021-09-02 NOTE — ED PROVIDER NOTE - PATIENT PORTAL LINK FT
You can access the FollowMyHealth Patient Portal offered by [facility] by registering at the following website: [email or web address]. By joining [employer]’s FollowMyHealth portal, you will also be able to view your health information using other applications (apps) compatible with our system.

## 2021-09-02 NOTE — ED PROVIDER NOTE - OBJECTIVE STATEMENT
74yo male with pmh DM, HTN, CKD, HL, presents with K of 6.9, BUN 27, 1.69 after routine follow up with PMD.  Pt otherwise asymptomatic. Pt did report mild lightheadedness this am, but denies sob, cp, palpitations, headache.    No fever/chills, No photophobia/eye pain/changes in vision, No ear pain/sore throat, No chest pain/palpitations, no SOB/cough, No abdominal pain, No N/V/D, no dysuria/frequency/discharge, No neck/back pain, no rash, no changes in neurological status/function. 76yo male with pmh DM, HTN, CKD, HL, presents with K of 6.9, BUN 27, 1.69 after routine follow up with PMD.  Pt otherwise asymptomatic. PT denies headache, sob, cp, palpitations, headache. Pt does report some intermittent dizziness for the past year that his doctor is aware of.  [employer] labs with pt, reports gross hemolysis.    No fever/chills, No photophobia/eye pain/changes in vision, No ear pain/sore throat, No chest pain/palpitations, no SOB/cough, No abdominal pain, No N/V/D, no dysuria/frequency/discharge, No neck/back pain, no rash, no changes in neurological status/function.

## 2021-09-02 NOTE — ED ADULT NURSE NOTE - OBJECTIVE STATEMENT
AAOx4, resps even and unlabored, skin warm and dry. Pt to see PCP for check-up on 8/25/21, was called today for potassium of 7.0. Denies CP or SOB, no symptoms reported.

## 2021-09-04 LAB
CULTURE RESULTS: SIGNIFICANT CHANGE UP
SPECIMEN SOURCE: SIGNIFICANT CHANGE UP

## 2021-09-19 ENCOUNTER — EMERGENCY (EMERGENCY)
Facility: HOSPITAL | Age: 76
LOS: 0 days | Discharge: HOME | End: 2021-09-19
Attending: EMERGENCY MEDICINE
Payer: MEDICARE

## 2021-09-19 VITALS
RESPIRATION RATE: 18 BRPM | SYSTOLIC BLOOD PRESSURE: 158 MMHG | DIASTOLIC BLOOD PRESSURE: 78 MMHG | HEART RATE: 74 BPM | TEMPERATURE: 99 F | OXYGEN SATURATION: 97 %

## 2021-09-19 VITALS
SYSTOLIC BLOOD PRESSURE: 146 MMHG | RESPIRATION RATE: 19 BRPM | HEART RATE: 69 BPM | OXYGEN SATURATION: 97 % | HEIGHT: 65 IN | DIASTOLIC BLOOD PRESSURE: 86 MMHG | WEIGHT: 164.91 LBS | TEMPERATURE: 98 F

## 2021-09-19 DIAGNOSIS — J40 BRONCHITIS, NOT SPECIFIED AS ACUTE OR CHRONIC: ICD-10-CM

## 2021-09-19 DIAGNOSIS — Z20.822 CONTACT WITH AND (SUSPECTED) EXPOSURE TO COVID-19: ICD-10-CM

## 2021-09-19 DIAGNOSIS — E11.9 TYPE 2 DIABETES MELLITUS WITHOUT COMPLICATIONS: ICD-10-CM

## 2021-09-19 DIAGNOSIS — Z90.5 ACQUIRED ABSENCE OF KIDNEY: Chronic | ICD-10-CM

## 2021-09-19 DIAGNOSIS — R51.9 HEADACHE, UNSPECIFIED: ICD-10-CM

## 2021-09-19 DIAGNOSIS — Z90.5 ACQUIRED ABSENCE OF KIDNEY: ICD-10-CM

## 2021-09-19 DIAGNOSIS — R05 COUGH: ICD-10-CM

## 2021-09-19 DIAGNOSIS — I10 ESSENTIAL (PRIMARY) HYPERTENSION: ICD-10-CM

## 2021-09-19 DIAGNOSIS — R09.81 NASAL CONGESTION: ICD-10-CM

## 2021-09-19 LAB
ACETONE SERPL-MCNC: NEGATIVE — SIGNIFICANT CHANGE UP
ALBUMIN SERPL ELPH-MCNC: 3.1 G/DL — LOW (ref 3.3–5)
ALP SERPL-CCNC: 95 U/L — SIGNIFICANT CHANGE UP (ref 40–120)
ALT FLD-CCNC: 27 U/L — SIGNIFICANT CHANGE UP (ref 12–78)
ANION GAP SERPL CALC-SCNC: 2 MMOL/L — LOW (ref 5–17)
AST SERPL-CCNC: 12 U/L — LOW (ref 15–37)
BASOPHILS # BLD AUTO: 0.03 K/UL — SIGNIFICANT CHANGE UP (ref 0–0.2)
BASOPHILS NFR BLD AUTO: 0.5 % — SIGNIFICANT CHANGE UP (ref 0–2)
BILIRUB SERPL-MCNC: 0.5 MG/DL — SIGNIFICANT CHANGE UP (ref 0.2–1.2)
BUN SERPL-MCNC: 29 MG/DL — HIGH (ref 7–23)
CALCIUM SERPL-MCNC: 9.5 MG/DL — SIGNIFICANT CHANGE UP (ref 8.5–10.1)
CHLORIDE SERPL-SCNC: 104 MMOL/L — SIGNIFICANT CHANGE UP (ref 96–108)
CO2 SERPL-SCNC: 28 MMOL/L — SIGNIFICANT CHANGE UP (ref 22–31)
CREAT SERPL-MCNC: 1.81 MG/DL — HIGH (ref 0.5–1.3)
EOSINOPHIL # BLD AUTO: 0.21 K/UL — SIGNIFICANT CHANGE UP (ref 0–0.5)
EOSINOPHIL NFR BLD AUTO: 3.3 % — SIGNIFICANT CHANGE UP (ref 0–6)
FLUAV AG NPH QL: SIGNIFICANT CHANGE UP
FLUBV AG NPH QL: SIGNIFICANT CHANGE UP
GLUCOSE SERPL-MCNC: 223 MG/DL — HIGH (ref 70–99)
HCT VFR BLD CALC: 43.9 % — SIGNIFICANT CHANGE UP (ref 39–50)
HGB BLD-MCNC: 14.4 G/DL — SIGNIFICANT CHANGE UP (ref 13–17)
IMM GRANULOCYTES NFR BLD AUTO: 0.2 % — SIGNIFICANT CHANGE UP (ref 0–1.5)
LACTATE SERPL-SCNC: 0.9 MMOL/L — SIGNIFICANT CHANGE UP (ref 0.7–2)
LYMPHOCYTES # BLD AUTO: 1.29 K/UL — SIGNIFICANT CHANGE UP (ref 1–3.3)
LYMPHOCYTES # BLD AUTO: 20.3 % — SIGNIFICANT CHANGE UP (ref 13–44)
MCHC RBC-ENTMCNC: 29 PG — SIGNIFICANT CHANGE UP (ref 27–34)
MCHC RBC-ENTMCNC: 32.8 GM/DL — SIGNIFICANT CHANGE UP (ref 32–36)
MCV RBC AUTO: 88.5 FL — SIGNIFICANT CHANGE UP (ref 80–100)
MONOCYTES # BLD AUTO: 0.84 K/UL — SIGNIFICANT CHANGE UP (ref 0–0.9)
MONOCYTES NFR BLD AUTO: 13.2 % — SIGNIFICANT CHANGE UP (ref 2–14)
NEUTROPHILS # BLD AUTO: 3.97 K/UL — SIGNIFICANT CHANGE UP (ref 1.8–7.4)
NEUTROPHILS NFR BLD AUTO: 62.5 % — SIGNIFICANT CHANGE UP (ref 43–77)
NRBC # BLD: 0 /100 WBCS — SIGNIFICANT CHANGE UP (ref 0–0)
PLATELET # BLD AUTO: 133 K/UL — LOW (ref 150–400)
POTASSIUM SERPL-MCNC: 5 MMOL/L — SIGNIFICANT CHANGE UP (ref 3.5–5.3)
POTASSIUM SERPL-SCNC: 5 MMOL/L — SIGNIFICANT CHANGE UP (ref 3.5–5.3)
PROT SERPL-MCNC: 7.3 GM/DL — SIGNIFICANT CHANGE UP (ref 6–8.3)
RBC # BLD: 4.96 M/UL — SIGNIFICANT CHANGE UP (ref 4.2–5.8)
RBC # FLD: 13.1 % — SIGNIFICANT CHANGE UP (ref 10.3–14.5)
SARS-COV-2 RNA SPEC QL NAA+PROBE: SIGNIFICANT CHANGE UP
SODIUM SERPL-SCNC: 134 MMOL/L — LOW (ref 135–145)
WBC # BLD: 6.35 K/UL — SIGNIFICANT CHANGE UP (ref 3.8–10.5)
WBC # FLD AUTO: 6.35 K/UL — SIGNIFICANT CHANGE UP (ref 3.8–10.5)

## 2021-09-19 PROCEDURE — 93010 ELECTROCARDIOGRAM REPORT: CPT

## 2021-09-19 PROCEDURE — 99285 EMERGENCY DEPT VISIT HI MDM: CPT

## 2021-09-19 PROCEDURE — 71045 X-RAY EXAM CHEST 1 VIEW: CPT | Mod: 26

## 2021-09-19 RX ORDER — SODIUM CHLORIDE 9 MG/ML
1000 INJECTION, SOLUTION INTRAVENOUS ONCE
Refills: 0 | Status: COMPLETED | OUTPATIENT
Start: 2021-09-19 | End: 2021-09-19

## 2021-09-19 RX ORDER — AZITHROMYCIN 500 MG/1
1 TABLET, FILM COATED ORAL
Qty: 4 | Refills: 0
Start: 2021-09-19 | End: 2021-09-22

## 2021-09-19 RX ORDER — AZITHROMYCIN 500 MG/1
500 TABLET, FILM COATED ORAL ONCE
Refills: 0 | Status: COMPLETED | OUTPATIENT
Start: 2021-09-19 | End: 2021-09-19

## 2021-09-19 RX ADMIN — AZITHROMYCIN 500 MILLIGRAM(S): 500 TABLET, FILM COATED ORAL at 14:11

## 2021-09-19 RX ADMIN — SODIUM CHLORIDE 1000 MILLILITER(S): 9 INJECTION, SOLUTION INTRAVENOUS at 12:44

## 2021-09-19 RX ADMIN — SODIUM CHLORIDE 1000 MILLILITER(S): 9 INJECTION, SOLUTION INTRAVENOUS at 11:44

## 2021-09-19 NOTE — ED ADULT NURSE NOTE - OBJECTIVE STATEMENT
Pt presents to ED c/o productive cough, body aches, congestion and HA X4 days. Pt denies CP, SOB, dizziness, fever/chills, N/V/D. Pt denies sick contacts. Pt states he was vaccinated for covid-19 in April 2021.

## 2021-09-19 NOTE — ED PROVIDER NOTE - PROGRESS NOTE DETAILS
Pt has been having pulse ox of 97 to 99 % in room air. Pt's breath sounds are clear equal bilaterally. Pt is given and explained all test reports and advised to follow up with pmd and return if symptoms persist or worsen.

## 2021-09-19 NOTE — ED PROVIDER NOTE - PATIENT PORTAL LINK FT
You can access the FollowMyHealth Patient Portal offered by [facility] by registering at the following website: [email or web address]. By joining [place] [employer] FollowMyHealth portal, you will also be able to view your health information using other applications (apps) compatible with our system.

## 2021-09-19 NOTE — ED PROVIDER NOTE - INCLUDE COVID-19 DISCHARGE INSTRUCTIONS
----- Message from YOEL Burris sent at 2/18/2021  7:48 AM EST -----  He does not have any growth in his culture therefore he can stop abx  
I notified patient  
<-------- Click here to INCLUDE CoVID-19 Discharge Instructions

## 2021-09-19 NOTE — ED PROVIDER NOTE - OBJECTIVE STATEMENT
75 years old male walked in c/o productive while yellowish sputum coughs, body aches, nasal congestions and bilateral fore head pain x 4 days. Pt sts he had covid vaccines Pfizer in April of this year. Pt denies recent hx of trauma, headache, dizziness, blurred visions, light sensitivities, focal/distal weakness or numbness, neck/back/calfs pain, chest pain, nausea, vomiting, fever, chills, abd pain, dysuria or irregular bowel movements.

## 2021-09-19 NOTE — ED PROVIDER NOTE - CONSTITUTIONAL, MLM
Well appearing, awake, alert, oriented to person, place, time/situation and in no apparent distress. Speaking in clear full sentences no nasal flaring no shoulders retractions no diaphoresis normal...

## 2021-09-19 NOTE — ED ADULT NURSE NOTE - CHPI ED NUR SYMPTOMS NEG
no chest pain/no chills/no diaphoresis/no edema/no fever/no hemoptysis/no shortness of breath/no wheezing

## 2021-11-10 ENCOUNTER — APPOINTMENT (OUTPATIENT)
Dept: ENDOCRINOLOGY | Facility: CLINIC | Age: 76
End: 2021-11-10
Payer: MEDICARE

## 2021-11-10 VITALS
BODY MASS INDEX: 26.66 KG/M2 | TEMPERATURE: 97.7 F | SYSTOLIC BLOOD PRESSURE: 144 MMHG | OXYGEN SATURATION: 97 % | HEIGHT: 65 IN | DIASTOLIC BLOOD PRESSURE: 84 MMHG | HEART RATE: 88 BPM | WEIGHT: 160 LBS | RESPIRATION RATE: 17 BRPM

## 2021-11-10 LAB — GLUCOSE BLDC GLUCOMTR-MCNC: 139

## 2021-11-10 PROCEDURE — 99214 OFFICE O/P EST MOD 30 MIN: CPT | Mod: 25

## 2021-11-10 PROCEDURE — 36415 COLL VENOUS BLD VENIPUNCTURE: CPT

## 2021-11-10 PROCEDURE — 95251 CONT GLUC MNTR ANALYSIS I&R: CPT

## 2021-11-10 PROCEDURE — 82962 GLUCOSE BLOOD TEST: CPT

## 2021-11-10 NOTE — ASSESSMENT
[Carbohydrate Consistent Diet] : carbohydrate consistent diet [Hypoglycemia Management] : hypoglycemia management [Diabetes Foot Care] : diabetes foot care [Long Term Vascular Complications] : long term vascular complications of diabetes [Action and use of Insulin] : action and use of short and long-acting insulin [Self Monitoring of Blood Glucose] : self monitoring of blood glucose [FreeTextEntry1] : T2DM, severely  uncontrolled with multiple microvascular complications\par - advised on meds compliance and diabetic complications\par - Lantus 18 units\par - resume Humalog ac B (5-5-6-10-11-12-13-14-15), ac L (5-1-9-10-11-12-13-14-15), ac D (5-8-3-8-9-10-11-12-13)\par - resume Januvia 25 mg qd. Will consider using a small dose of SGLT2 inhibitors- I'd like to discuss with the nephrologist first\par - Breanna  2\par - advised to bring the reports of his stress tests/ echo\par - podiatry evaluation\par RTC 3 mos and CDE evaluation in between\par

## 2021-11-10 NOTE — HISTORY OF PRESENT ILLNESS
[FreeTextEntry1] : F/u for diabetes management\par \par *** Nov 10, 2021 ***\par \par missed f/u appts\par taking Lantus 14 units,  Humalog ac B and ac D - skips ac lunch, not using  Januvia \par labs from 8/21- a1c- 9.2, creat- 1.69, potassium- 6.9\par \par wearing Breanna\par Date of download:  11/10/21\par Diabetes Medications and Dosage: as above\par Indication for CGMS: verify a change in the treatment regimen, identify periods of hypoglycemia/ hyperglycemia. \par Modal day report: pattern. \par Pt with HYPO  0% of the time (NONE below 54),  36% in target range\par Hyperglycemia:  64% elevation \par Identified issues: carbohydrate counting\par dates analyzed: 10/28/21-11/10/21\par \par *** Oct 26, 2020 ***\par \par last visit in 11/19\par recovered from COVID\par on  Lantus 14 units but stopped taking Humalog\par labs from 9/20- a1c- 9.1, creat- 1.84, ca-9.7\par drives cab, irregular eating habits\par \par *** Nov 08, 2019 ***\par \par Patient returned for breanna interpretation and diabetes management\par see details below:\par \par Date sensor was placed: 10/18/19\par Date of download:  11/6/19\par Diabetes Medications and Dosage: taking lantus 15 to 18 un qd 4/wk, prandin 1mg ac cerain meals\par Indication for CGMS: verify a change in the treatment regimen, identify periods of hypoglycemia/ hyperglycemia. \par Modal day report: pattern. \par Hypoglycemia: patterned, Pt with HYPO 7 % of the time (2% below 54), 65 % in target range\par Hyperglycemia: 28 % elevation \par Identified issues: carbohydrate counting; hypo's after taking prandin and not eating\par dates analyzed: 10/18/19 - 10/30/19\par \par took 15 units of lantus this am, and prandin 1mg ac B- today in the office p/B- 232\par a1c- 8.0,  urine m/alb- 112, \par c-pept- 3.5 w/ insulin -7.9\par creat- 1.78, K- 5.4, ca- 10.6\par LDL- 88\par \par HISTORY OF PRESENT ILLNESS. \par \par Mr. MINOR was diagnosed with Diabetes Mellitus Type 2 in 1997\par Reports history HTN, dyslipidemia, renal hemorrhage (s/p right nephrectomy), PRDRP (s/p laser therapy)\par He denies CAD. He was previously on Lantus 10 units , Humalog and metformin, but stopped everything about 2-3 months ago\par Presently on repaglinide (not sure of the dose)\par Blood sugars are checked once a day. \par Did not bring log book, but reported are typically as following: FBS- 117-200, not checking other times \par Hypoglycemia frequency: twice a month\par Fingerstick glucose in the office today is 133 mg/dL  hours after eating. \par Diet: not following ADA\par Exercise: none\par \par \par ****\par Last dilated eye - 10/21\par Last podiatry visit  - 10/21\par Last cardiology evaluation - 2017\par Last stress test - 2017, normal per patient\par Last 2-D Echo - 2017, normal per patient\par Last nephrology evaluation - 6/19 \par Last neurology evaluation- none\par \par

## 2021-11-11 ENCOUNTER — TRANSCRIPTION ENCOUNTER (OUTPATIENT)
Age: 76
End: 2021-11-11

## 2021-11-11 LAB
25(OH)D3 SERPL-MCNC: 16.5 NG/ML
ALBUMIN SERPL ELPH-MCNC: 4.2 G/DL
ALP BLD-CCNC: 103 U/L
ALT SERPL-CCNC: 16 U/L
ANION GAP SERPL CALC-SCNC: 14 MMOL/L
AST SERPL-CCNC: 16 U/L
BILIRUB SERPL-MCNC: 0.2 MG/DL
BUN SERPL-MCNC: 27 MG/DL
CALCIUM SERPL-MCNC: 10.3 MG/DL
CALCIUM SERPL-MCNC: 10.3 MG/DL
CHLORIDE SERPL-SCNC: 103 MMOL/L
CO2 SERPL-SCNC: 20 MMOL/L
CREAT SERPL-MCNC: 1.52 MG/DL
CREAT SPEC-SCNC: 42 MG/DL
ESTIMATED AVERAGE GLUCOSE: 237 MG/DL
FRUCTOSAMINE SERPL-MCNC: 375 UMOL/L
GLUCOSE SERPL-MCNC: 153 MG/DL
HBA1C MFR BLD HPLC: 9.9 %
MICROALBUMIN 24H UR DL<=1MG/L-MCNC: 25.6 MG/DL
MICROALBUMIN/CREAT 24H UR-RTO: 606 MG/G
PARATHYROID HORMONE INTACT: 129 PG/ML
POTASSIUM SERPL-SCNC: 4.9 MMOL/L
POTASSIUM SERPL-SCNC: 5 MMOL/L
PROT SERPL-MCNC: 7 G/DL
SODIUM SERPL-SCNC: 137 MMOL/L
T4 FREE SERPL-MCNC: 1.4 NG/DL
TSH SERPL-ACNC: 1.3 UIU/ML

## 2021-11-15 ENCOUNTER — APPOINTMENT (OUTPATIENT)
Dept: UROLOGY | Facility: CLINIC | Age: 76
End: 2021-11-15

## 2022-01-26 NOTE — ED ADULT NURSE NOTE - DISCHARGE DATE/TIME
[Independent] : managing medications [Some assistance needed] : managing finances [No falls in past year] : Patient reported no falls in the past year [No] : The patient does not have visual impairment [HRA Reviewed] : Health risk assessments reviewed [TimeGetUpGo] : 14 19-Sep-2021 14:10

## 2022-02-25 ENCOUNTER — INPATIENT (INPATIENT)
Facility: HOSPITAL | Age: 77
LOS: 3 days | Discharge: HOME HEALTH | End: 2022-03-01
Attending: STUDENT IN AN ORGANIZED HEALTH CARE EDUCATION/TRAINING PROGRAM | Admitting: STUDENT IN AN ORGANIZED HEALTH CARE EDUCATION/TRAINING PROGRAM
Payer: MEDICARE

## 2022-02-25 VITALS
HEIGHT: 65 IN | WEIGHT: 175.05 LBS | DIASTOLIC BLOOD PRESSURE: 84 MMHG | HEART RATE: 72 BPM | RESPIRATION RATE: 16 BRPM | SYSTOLIC BLOOD PRESSURE: 158 MMHG | TEMPERATURE: 97 F | OXYGEN SATURATION: 99 %

## 2022-02-25 DIAGNOSIS — Z90.5 ACQUIRED ABSENCE OF KIDNEY: Chronic | ICD-10-CM

## 2022-02-25 LAB
ALBUMIN SERPL ELPH-MCNC: 2.9 G/DL — LOW (ref 3.3–5)
ALP SERPL-CCNC: 79 U/L — SIGNIFICANT CHANGE UP (ref 40–120)
ALT FLD-CCNC: 25 U/L — SIGNIFICANT CHANGE UP (ref 12–78)
ANION GAP SERPL CALC-SCNC: 4 MMOL/L — LOW (ref 5–17)
AST SERPL-CCNC: 20 U/L — SIGNIFICANT CHANGE UP (ref 15–37)
BASOPHILS # BLD AUTO: 0.03 K/UL — SIGNIFICANT CHANGE UP (ref 0–0.2)
BASOPHILS NFR BLD AUTO: 0.3 % — SIGNIFICANT CHANGE UP (ref 0–2)
BILIRUB SERPL-MCNC: 0.4 MG/DL — SIGNIFICANT CHANGE UP (ref 0.2–1.2)
BUN SERPL-MCNC: 30 MG/DL — HIGH (ref 7–23)
CALCIUM SERPL-MCNC: 9.4 MG/DL — SIGNIFICANT CHANGE UP (ref 8.5–10.1)
CHLORIDE SERPL-SCNC: 106 MMOL/L — SIGNIFICANT CHANGE UP (ref 96–108)
CO2 SERPL-SCNC: 25 MMOL/L — SIGNIFICANT CHANGE UP (ref 22–31)
CREAT SERPL-MCNC: 1.76 MG/DL — HIGH (ref 0.5–1.3)
EOSINOPHIL # BLD AUTO: 0.08 K/UL — SIGNIFICANT CHANGE UP (ref 0–0.5)
EOSINOPHIL NFR BLD AUTO: 0.8 % — SIGNIFICANT CHANGE UP (ref 0–6)
FLUAV AG NPH QL: SIGNIFICANT CHANGE UP
FLUBV AG NPH QL: SIGNIFICANT CHANGE UP
GLUCOSE BLDC GLUCOMTR-MCNC: 114 MG/DL — HIGH (ref 70–99)
GLUCOSE BLDC GLUCOMTR-MCNC: 147 MG/DL — HIGH (ref 70–99)
GLUCOSE SERPL-MCNC: 134 MG/DL — HIGH (ref 70–99)
HCT VFR BLD CALC: 44.2 % — SIGNIFICANT CHANGE UP (ref 39–50)
HGB BLD-MCNC: 14.4 G/DL — SIGNIFICANT CHANGE UP (ref 13–17)
IMM GRANULOCYTES NFR BLD AUTO: 0.3 % — SIGNIFICANT CHANGE UP (ref 0–1.5)
LACTATE SERPL-SCNC: 0.9 MMOL/L — SIGNIFICANT CHANGE UP (ref 0.7–2)
LYMPHOCYTES # BLD AUTO: 0.91 K/UL — LOW (ref 1–3.3)
LYMPHOCYTES # BLD AUTO: 9.7 % — LOW (ref 13–44)
MCHC RBC-ENTMCNC: 29 PG — SIGNIFICANT CHANGE UP (ref 27–34)
MCHC RBC-ENTMCNC: 32.6 G/DL — SIGNIFICANT CHANGE UP (ref 32–36)
MCV RBC AUTO: 88.9 FL — SIGNIFICANT CHANGE UP (ref 80–100)
MONOCYTES # BLD AUTO: 0.66 K/UL — SIGNIFICANT CHANGE UP (ref 0–0.9)
MONOCYTES NFR BLD AUTO: 7 % — SIGNIFICANT CHANGE UP (ref 2–14)
NEUTROPHILS # BLD AUTO: 7.72 K/UL — HIGH (ref 1.8–7.4)
NEUTROPHILS NFR BLD AUTO: 81.9 % — HIGH (ref 43–77)
NRBC # BLD: 0 /100 WBCS — SIGNIFICANT CHANGE UP (ref 0–0)
PLATELET # BLD AUTO: 143 K/UL — LOW (ref 150–400)
POTASSIUM SERPL-MCNC: 4.9 MMOL/L — SIGNIFICANT CHANGE UP (ref 3.5–5.3)
POTASSIUM SERPL-SCNC: 4.9 MMOL/L — SIGNIFICANT CHANGE UP (ref 3.5–5.3)
PROT SERPL-MCNC: 6.9 GM/DL — SIGNIFICANT CHANGE UP (ref 6–8.3)
RBC # BLD: 4.97 M/UL — SIGNIFICANT CHANGE UP (ref 4.2–5.8)
RBC # FLD: 12.9 % — SIGNIFICANT CHANGE UP (ref 10.3–14.5)
SARS-COV-2 RNA SPEC QL NAA+PROBE: SIGNIFICANT CHANGE UP
SODIUM SERPL-SCNC: 135 MMOL/L — SIGNIFICANT CHANGE UP (ref 135–145)
TROPONIN I, HIGH SENSITIVITY RESULT: 12.1 NG/L — SIGNIFICANT CHANGE UP
WBC # BLD: 9.43 K/UL — SIGNIFICANT CHANGE UP (ref 3.8–10.5)
WBC # FLD AUTO: 9.43 K/UL — SIGNIFICANT CHANGE UP (ref 3.8–10.5)

## 2022-02-25 PROCEDURE — 99285 EMERGENCY DEPT VISIT HI MDM: CPT

## 2022-02-25 PROCEDURE — 71045 X-RAY EXAM CHEST 1 VIEW: CPT | Mod: 26

## 2022-02-25 PROCEDURE — 70450 CT HEAD/BRAIN W/O DYE: CPT | Mod: 26,MA

## 2022-02-25 PROCEDURE — 99223 1ST HOSP IP/OBS HIGH 75: CPT

## 2022-02-25 RX ORDER — SODIUM CHLORIDE 9 MG/ML
500 INJECTION INTRAMUSCULAR; INTRAVENOUS; SUBCUTANEOUS ONCE
Refills: 0 | Status: COMPLETED | OUTPATIENT
Start: 2022-02-25 | End: 2022-02-25

## 2022-02-25 RX ADMIN — SODIUM CHLORIDE 500 MILLILITER(S): 9 INJECTION INTRAMUSCULAR; INTRAVENOUS; SUBCUTANEOUS at 21:47

## 2022-02-25 NOTE — ED ADULT TRIAGE NOTE - CHIEF COMPLAINT QUOTE
BIBA,  hypoglycemic.  pt has had unsteady gait, slurred speech, diaphoretic since noon.  speech has improved since buy unsteady gait.  per EMS  FS 58, 25gm dextrose given .    #20 guage R-AC.   (son- Marc Gomez [phone]).  (PMH-DM, frequent UTI's, HTN, possible dementia) BIBA,  hypoglycemic.  pt has had unsteady gait, slurred speech, diaphoretic since noon.  speech has improved since buy unsteady gait.  per EMS  FS 58, 25gm dextrose given .    #20 guage R-AC.   (son- Marc Gomez [phone]).  (PMH-DM, frequent UTI's, HTN, possible dementia).  Dr. Espinosa in triage for stroke eval.  no code called

## 2022-02-25 NOTE — H&P ADULT - ASSESSMENT
77 y/o male with PMHx DM type 2, HTN, s/p R nephrectomy, Dementia presents to the ED due to weakness, possible syncope and hypoglycemia.    1) Hypoglycemia  - Likely etiology of slurred speech and possible syncope  - pt reports he has been forgetful and may have taken too much of his Metformin; he's not sure  - now resolved, place on FS qAC and HS w/ SSI  - given cognitive impairment would advise family to regulate medications.         75 y/o male with PMHx DM type 2, HTN, s/p R nephrectomy, CKD stage 3, Dementia presents to the ED due to weakness, possible syncope and hypoglycemia.    1) Hypoglycemia  - Likely etiology of slurred speech and possible syncope, however would obtain MRI, Echo  - pt reports he has been forgetful and may have taken too much of his medication; he's not sure  - now resolved, place on FS qAC and HS w/ SSI  - given cognitive impairment would advise family to regulate medications more closely.    - f/u A1c    2) HTN  - c/w Atenol    3) CKD stage 3  - renal fxn stable    4) DVT ppx - HSQ

## 2022-02-25 NOTE — H&P ADULT - HISTORY OF PRESENT ILLNESS
75 y/o male with PMHx IDDM, HTN, s/p R nephrectomy, Dementia presents to the ED due to weakness. Pt presently AAOX3 reports  Son reports patient is very forgetful, worsened over past month. Lives with wife and son, manages own appointments and medications. Son reports that patient's blood sugar is usually 200-400. Today, patient was home alone all day. Around 12noon patient sounded normal, didn't answer phone at 1 PM. Patient's son came home at 6Pm, patient was lying in bed with legs hanging down, slurred speech, unable to stand. Pt told family that at 12noon, he may have lost consciousness after using bathroom, when awoke was on the floor and crawled up the stairs to his bed. Son reports he suspects patient is using his medications incorrectly, has not been taking his blood sugars because of phone reset. 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            75 y/o male with PMHx DM type 2, HTN, s/p R nephrectomy, Dementia presents to the ED due to weakness. Pt presently AAOX3 reports  Son reports patient is very forgetful, worsened over past month. Lives with wife and son, manages own appointments and medications. Son reports that patient's blood sugar is usually 200-400. Today, patient was home alone all day. Around 12noon patient sounded normal, didn't answer phone at 1 PM. Patient's son came home at 6Pm, patient was lying in bed with legs hanging down, slurred speech, unable to stand. Pt told family that at 12noon, he may have lost consciousness after using bathroom, when awoke was on the floor and crawled up the stairs to his bed. Son reports he suspects patient is using his medications incorrectly, has not been taking his blood sugars because of phone reset. 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            77 y/o male with PMHx DM type 2, HTN, s/p R nephrectomy, Dementia (noted cognitive impairment for the last few months to a yr per pt) presents to the ED due to weakness. Pt presently AAOX3 reports after using the rest room he suddenly "lost all energy" and gentle went/sat down to the floor (he did not fall). Pt reports he also became diaphoretic, states he was able to pick himself up by grabbing on the things/stair railings till made it to bed. Pt unsure however thinks he may have lost consciousness. Family reported patient has very forgetful, worsened over past month. Lives with wife and son, manages own appointments and medications. Son reports that patient's blood sugar is usually 200-400. Today, patient was home alone all day. Around 12noon patient sounded normal, didn't answer phone at 1 PM. Patient's son came home at 6Pm, patient was lying in bed with legs hanging down, slurred speech, unable to stand. Pt reports he may mixed up his medications. Pt denies cp, SOB, dizziness or palpitations, HA or unilateral weakness

## 2022-02-25 NOTE — ED ADULT NURSE NOTE - OBJECTIVE STATEMENT
pt presents to ed a&ox3 breathing spont/unlabored to RA. c/o slurred speech at 12pm today fell in the bathroom denies loc, sweating. fs 58 per ems, hx dm htn, R kidney removed

## 2022-02-25 NOTE — ED PROVIDER NOTE - PHYSICAL EXAMINATION
Gen: no acute distress, tired appearing, awake, alert  Cardiac: regular rate and rhythm, +S1S2  Pulm: Clear to auscultation bilaterally  Abd: soft, nontender, nondistended, no guarding  Back: neg CVA ttp, nontender spine  Extremity: no edema, no deformity, warm and well perfused, FROM all extremities    Neuro: awake, alert, sensorimotor intact

## 2022-02-25 NOTE — ED ADULT NURSE NOTE - NSIMPLEMENTINTERV_GEN_ALL_ED
Implemented All Fall Risk Interventions:  [place] to call system. Call bell, personal items and telephone within reach. Instruct patient to call for assistance. Room bathroom lighting operational. Non-slip footwear when patient is off stretcher. Physically safe environment: no spills, clutter or unnecessary equipment. Stretcher in lowest position, wheels locked, appropriate side rails in place. Provide visual cue, wrist band, yellow gown, etc. Monitor gait and stability. Monitor for mental status changes and reorient to person, place, and time. Review medications for side effects contributing to fall risk. Reinforce activity limits and safety measures with patient and family.

## 2022-02-25 NOTE — ED PROVIDER NOTE - OBJECTIVE STATEMENT
75 yo male with PMH 75 yo male with PMH IDDM, HTN, s/p R nephrectomy for finding of tumor, chronic UTI, ?early dementia. Son reports patient is very forgetful, worsened over past month. Lives with wife and son, manages own appointments and medications. Son reports that patient's blood sugar is usually 200-400. Today, patient was home alone all day. Around 12noon patient sounded normal, didn't answer phone at 1 PM. Patient's son came home at 6Pm, patient was lying in bed with legs hanging down, slurred speech, unable to stand. Pt told family that at 12noon, he may have lost consciousness after using bathroom, when awoke was on the floor and crawled up the stairs to his bed. Son reports he suspects patient is using his medications incorrectly, has not been taking his blood sugars because of phone reset. 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

## 2022-02-25 NOTE — H&P ADULT - NSHPLABSRESULTS_GEN_ALL_CORE
T(C): 36.6 (22 @ 04:53), Max: 36.9 (22 @ 00:16)  HR: 60 (22 @ 04:53) (60 - 72)  BP: 168/89 (22 @ 04:53) (153/82 - 168/89)  RR: 18 (22 @ 04:53) (16 - 19)  SpO2: 100% (22 @ 04:53) (96% - 100%)                        14.4   9.43  )-----------( 143      ( 2022 22:20 )             44.2         135  |  106  |  30<H>  ----------------------------<  134<H>  4.9   |  25  |  1.76<H>    Ca    9.4      2022 22:20    TPro  6.9  /  Alb  2.9<L>  /  TBili  0.4  /  DBili  x   /  AST  20  /  ALT  25  /  AlkPhos  79      LIVER FUNCTIONS - ( 2022 22:20 )  Alb: 2.9 g/dL / Pro: 6.9 gm/dL / ALK PHOS: 79 U/L / ALT: 25 U/L / AST: 20 U/L / GGT: x             Urinalysis Basic - ( 2022 07:00 )    Color: Yellow / Appearance: Clear / S.010 / pH: x  Gluc: x / Ketone: Negative  / Bili: Negative / Urobili: Negative mg/dL   Blood: x / Protein: 30 mg/dL / Nitrite: Negative   Leuk Esterase: Negative / RBC: x / WBC x   Sq Epi: x / Non Sq Epi: x / Bacteria: x      < from: CT Head No Cont (22 @ 22:12) >    IMPRESSION:    No acute intracranial hemorrhage, large cortical infarct or mass effect.   If there is continued clinical suspicion for acute infarct, MRI may be   obtained for further evaluation.      < end of copied text >    EKG - NSR at 69bpm, nonsp T wave flattening    MEDICATIONS  (STANDING):  aspirin enteric coated 81 milliGRAM(s) Oral daily  ATENolol  Tablet 25 milliGRAM(s) Oral daily  atorvastatin 40 milliGRAM(s) Oral at bedtime  dextrose 40% Gel 15 Gram(s) Oral once  dextrose 5%. 1000 milliLiter(s) (50 mL/Hr) IV Continuous <Continuous>  dextrose 5%. 1000 milliLiter(s) (100 mL/Hr) IV Continuous <Continuous>  dextrose 50% Injectable 25 Gram(s) IV Push once  dextrose 50% Injectable 12.5 Gram(s) IV Push once  dextrose 50% Injectable 25 Gram(s) IV Push once  finasteride 5 milliGRAM(s) Oral daily  gabapentin 400 milliGRAM(s) Oral three times a day  glucagon  Injectable 1 milliGRAM(s) IntraMuscular once  heparin   Injectable 5000 Unit(s) SubCutaneous every 8 hours  insulin lispro (ADMELOG) corrective regimen sliding scale   SubCutaneous three times a day before meals  insulin lispro (ADMELOG) corrective regimen sliding scale   SubCutaneous at bedtime  loratadine 10 milliGRAM(s) Oral daily  pantoprazole    Tablet 40 milliGRAM(s) Oral before breakfast  tamsulosin 0.4 milliGRAM(s) Oral at bedtime    MEDICATIONS  (PRN):

## 2022-02-25 NOTE — H&P ADULT - NSHPPHYSICALEXAM_GEN_ALL_CORE
PHYSICAL EXAM:    Vital Signs Last 24 Hrs  T(C): 36.6 (26 Feb 2022 04:53), Max: 36.9 (26 Feb 2022 00:16)  T(F): 97.8 (26 Feb 2022 04:53), Max: 98.4 (26 Feb 2022 00:16)  HR: 60 (26 Feb 2022 04:53) (60 - 72)  BP: 168/89 (26 Feb 2022 04:53) (153/82 - 168/89)  BP(mean): --  RR: 18 (26 Feb 2022 04:53) (16 - 19)  SpO2: 100% (26 Feb 2022 04:53) (96% - 100%)    GENERAL: Pt lying in bed comfortably in NAD  HEENT:  Atraumatic, EOMI, PERRL, conjunctiva and sclera clear, MMM  NECK: Supple, No JVD  CHEST/LUNG: Clear to auscultation bilaterally; No rales, rhonchi, wheezing or rubs. Unlabored respirations  HEART: Regular rate and rhythm;   ABDOMEN: Bowel sounds present; Soft, Nontender, Nondistended.   EXTREMITIES:  2+ Peripheral Pulses, brisk capillary refill. No edema  NEUROLOGICAL:  Alert & Oriented X3, No deficits   MSK: FROM x 4 extremities   SKIN: No rashes or lesions Statement Selected

## 2022-02-25 NOTE — ED ADULT NURSE NOTE - CHIEF COMPLAINT QUOTE
BIBA,  hypoglycemic.  pt has had unsteady gait, slurred speech, diaphoretic since noon.  speech has improved since buy unsteady gait.  per EMS  FS 58, 25gm dextrose given .    #20 guage R-AC.   (son- Marc Gomez [phone]).  (PMH-DM, frequent UTI's, HTN, possible dementia).  Dr. Espinosa in triage for stroke eval.  no code called

## 2022-02-26 DIAGNOSIS — N18.30 CHRONIC KIDNEY DISEASE, STAGE 3 UNSPECIFIED: ICD-10-CM

## 2022-02-26 DIAGNOSIS — E16.2 HYPOGLYCEMIA, UNSPECIFIED: ICD-10-CM

## 2022-02-26 DIAGNOSIS — F03.90 UNSPECIFIED DEMENTIA, UNSPECIFIED SEVERITY, WITHOUT BEHAVIORAL DISTURBANCE, PSYCHOTIC DISTURBANCE, MOOD DISTURBANCE, AND ANXIETY: ICD-10-CM

## 2022-02-26 LAB
A1C WITH ESTIMATED AVERAGE GLUCOSE RESULT: 10.2 % — HIGH (ref 4–5.6)
APPEARANCE UR: CLEAR — SIGNIFICANT CHANGE UP
BACTERIA # UR AUTO: ABNORMAL
BILIRUB UR-MCNC: NEGATIVE — SIGNIFICANT CHANGE UP
COLOR SPEC: YELLOW — SIGNIFICANT CHANGE UP
COMMENT - URINE: SIGNIFICANT CHANGE UP
DIFF PNL FLD: ABNORMAL
EPI CELLS # UR: SIGNIFICANT CHANGE UP
ESTIMATED AVERAGE GLUCOSE: 246 MG/DL — HIGH (ref 68–114)
GLUCOSE BLDC GLUCOMTR-MCNC: 102 MG/DL — HIGH (ref 70–99)
GLUCOSE BLDC GLUCOMTR-MCNC: 234 MG/DL — HIGH (ref 70–99)
GLUCOSE BLDC GLUCOMTR-MCNC: 281 MG/DL — HIGH (ref 70–99)
GLUCOSE BLDC GLUCOMTR-MCNC: 290 MG/DL — HIGH (ref 70–99)
GLUCOSE UR QL: NEGATIVE MG/DL — SIGNIFICANT CHANGE UP
KETONES UR-MCNC: NEGATIVE — SIGNIFICANT CHANGE UP
LEUKOCYTE ESTERASE UR-ACNC: NEGATIVE — SIGNIFICANT CHANGE UP
NITRITE UR-MCNC: NEGATIVE — SIGNIFICANT CHANGE UP
PH UR: 6 — SIGNIFICANT CHANGE UP (ref 5–8)
PROT UR-MCNC: 30 MG/DL
RBC CASTS # UR COMP ASSIST: ABNORMAL /HPF (ref 0–4)
SP GR SPEC: 1.01 — SIGNIFICANT CHANGE UP (ref 1.01–1.02)
UROBILINOGEN FLD QL: NEGATIVE MG/DL — SIGNIFICANT CHANGE UP
WBC UR QL: SIGNIFICANT CHANGE UP

## 2022-02-26 PROCEDURE — 93010 ELECTROCARDIOGRAM REPORT: CPT

## 2022-02-26 PROCEDURE — 99233 SBSQ HOSP IP/OBS HIGH 50: CPT

## 2022-02-26 PROCEDURE — 70551 MRI BRAIN STEM W/O DYE: CPT | Mod: 26

## 2022-02-26 PROCEDURE — 93306 TTE W/DOPPLER COMPLETE: CPT | Mod: 26

## 2022-02-26 RX ORDER — DEXTROSE 50 % IN WATER 50 %
25 SYRINGE (ML) INTRAVENOUS ONCE
Refills: 0 | Status: DISCONTINUED | OUTPATIENT
Start: 2022-02-26 | End: 2022-03-01

## 2022-02-26 RX ORDER — TAMSULOSIN HYDROCHLORIDE 0.4 MG/1
0.4 CAPSULE ORAL AT BEDTIME
Refills: 0 | Status: DISCONTINUED | OUTPATIENT
Start: 2022-02-26 | End: 2022-03-01

## 2022-02-26 RX ORDER — INSULIN GLARGINE 100 [IU]/ML
10 INJECTION, SOLUTION SUBCUTANEOUS AT BEDTIME
Refills: 0 | Status: DISCONTINUED | OUTPATIENT
Start: 2022-02-26 | End: 2022-02-27

## 2022-02-26 RX ORDER — DEXTROSE 50 % IN WATER 50 %
15 SYRINGE (ML) INTRAVENOUS ONCE
Refills: 0 | Status: DISCONTINUED | OUTPATIENT
Start: 2022-02-26 | End: 2022-03-01

## 2022-02-26 RX ORDER — SODIUM CHLORIDE 9 MG/ML
1000 INJECTION, SOLUTION INTRAVENOUS
Refills: 0 | Status: DISCONTINUED | OUTPATIENT
Start: 2022-02-26 | End: 2022-03-01

## 2022-02-26 RX ORDER — ATORVASTATIN CALCIUM 80 MG/1
80 TABLET, FILM COATED ORAL AT BEDTIME
Refills: 0 | Status: DISCONTINUED | OUTPATIENT
Start: 2022-02-26 | End: 2022-03-01

## 2022-02-26 RX ORDER — FINASTERIDE 5 MG/1
5 TABLET, FILM COATED ORAL DAILY
Refills: 0 | Status: DISCONTINUED | OUTPATIENT
Start: 2022-02-26 | End: 2022-03-01

## 2022-02-26 RX ORDER — GLUCAGON INJECTION, SOLUTION 0.5 MG/.1ML
1 INJECTION, SOLUTION SUBCUTANEOUS ONCE
Refills: 0 | Status: DISCONTINUED | OUTPATIENT
Start: 2022-02-26 | End: 2022-03-01

## 2022-02-26 RX ORDER — LORATADINE 10 MG/1
10 TABLET ORAL DAILY
Refills: 0 | Status: DISCONTINUED | OUTPATIENT
Start: 2022-02-26 | End: 2022-03-01

## 2022-02-26 RX ORDER — INSULIN LISPRO 100/ML
VIAL (ML) SUBCUTANEOUS
Refills: 0 | Status: DISCONTINUED | OUTPATIENT
Start: 2022-02-26 | End: 2022-02-27

## 2022-02-26 RX ORDER — INSULIN LISPRO 100/ML
VIAL (ML) SUBCUTANEOUS AT BEDTIME
Refills: 0 | Status: DISCONTINUED | OUTPATIENT
Start: 2022-02-26 | End: 2022-03-01

## 2022-02-26 RX ORDER — DEXTROSE 50 % IN WATER 50 %
12.5 SYRINGE (ML) INTRAVENOUS ONCE
Refills: 0 | Status: DISCONTINUED | OUTPATIENT
Start: 2022-02-26 | End: 2022-03-01

## 2022-02-26 RX ORDER — ASPIRIN/CALCIUM CARB/MAGNESIUM 324 MG
81 TABLET ORAL DAILY
Refills: 0 | Status: DISCONTINUED | OUTPATIENT
Start: 2022-02-26 | End: 2022-03-01

## 2022-02-26 RX ORDER — ATENOLOL 25 MG/1
25 TABLET ORAL DAILY
Refills: 0 | Status: DISCONTINUED | OUTPATIENT
Start: 2022-02-26 | End: 2022-03-01

## 2022-02-26 RX ORDER — ATORVASTATIN CALCIUM 80 MG/1
40 TABLET, FILM COATED ORAL AT BEDTIME
Refills: 0 | Status: DISCONTINUED | OUTPATIENT
Start: 2022-02-26 | End: 2022-02-26

## 2022-02-26 RX ORDER — GABAPENTIN 400 MG/1
400 CAPSULE ORAL THREE TIMES A DAY
Refills: 0 | Status: DISCONTINUED | OUTPATIENT
Start: 2022-02-26 | End: 2022-02-26

## 2022-02-26 RX ORDER — HYDRALAZINE HCL 50 MG
25 TABLET ORAL
Refills: 0 | Status: DISCONTINUED | OUTPATIENT
Start: 2022-02-26 | End: 2022-03-01

## 2022-02-26 RX ORDER — METFORMIN HYDROCHLORIDE 850 MG/1
1 TABLET ORAL
Qty: 0 | Refills: 0 | DISCHARGE

## 2022-02-26 RX ORDER — PANTOPRAZOLE SODIUM 20 MG/1
40 TABLET, DELAYED RELEASE ORAL
Refills: 0 | Status: DISCONTINUED | OUTPATIENT
Start: 2022-02-26 | End: 2022-03-01

## 2022-02-26 RX ORDER — HYDRALAZINE HCL 50 MG
25 TABLET ORAL DAILY
Refills: 0 | Status: DISCONTINUED | OUTPATIENT
Start: 2022-02-26 | End: 2022-02-26

## 2022-02-26 RX ORDER — METHENAMINE MANDELATE 1 G
1 TABLET ORAL
Qty: 0 | Refills: 0 | DISCHARGE

## 2022-02-26 RX ORDER — HEPARIN SODIUM 5000 [USP'U]/ML
5000 INJECTION INTRAVENOUS; SUBCUTANEOUS EVERY 8 HOURS
Refills: 0 | Status: DISCONTINUED | OUTPATIENT
Start: 2022-02-26 | End: 2022-03-01

## 2022-02-26 RX ADMIN — ATORVASTATIN CALCIUM 80 MILLIGRAM(S): 80 TABLET, FILM COATED ORAL at 21:48

## 2022-02-26 RX ADMIN — Medication 3: at 17:10

## 2022-02-26 RX ADMIN — INSULIN GLARGINE 10 UNIT(S): 100 INJECTION, SOLUTION SUBCUTANEOUS at 21:47

## 2022-02-26 RX ADMIN — Medication 25 MILLIGRAM(S): at 06:26

## 2022-02-26 RX ADMIN — Medication 2: at 11:27

## 2022-02-26 RX ADMIN — FINASTERIDE 5 MILLIGRAM(S): 5 TABLET, FILM COATED ORAL at 11:21

## 2022-02-26 RX ADMIN — HEPARIN SODIUM 5000 UNIT(S): 5000 INJECTION INTRAVENOUS; SUBCUTANEOUS at 05:09

## 2022-02-26 RX ADMIN — LORATADINE 10 MILLIGRAM(S): 10 TABLET ORAL at 11:21

## 2022-02-26 RX ADMIN — ATENOLOL 25 MILLIGRAM(S): 25 TABLET ORAL at 11:22

## 2022-02-26 RX ADMIN — HEPARIN SODIUM 5000 UNIT(S): 5000 INJECTION INTRAVENOUS; SUBCUTANEOUS at 21:48

## 2022-02-26 RX ADMIN — Medication 81 MILLIGRAM(S): at 11:22

## 2022-02-26 RX ADMIN — Medication 25 MILLIGRAM(S): at 17:11

## 2022-02-26 RX ADMIN — TAMSULOSIN HYDROCHLORIDE 0.4 MILLIGRAM(S): 0.4 CAPSULE ORAL at 21:48

## 2022-02-26 RX ADMIN — Medication 1: at 21:48

## 2022-02-26 RX ADMIN — HEPARIN SODIUM 5000 UNIT(S): 5000 INJECTION INTRAVENOUS; SUBCUTANEOUS at 14:17

## 2022-02-26 NOTE — CONSULT NOTE ADULT - ASSESSMENT
77 y/o male with PMHx DM type 2, HTN, s/p R nephrectomy, Dementia presents to the ED due to weakness. Exam currently non-focal NIHSS 0. MRI brain read as an acute right pontine infarct. reviewed imaging unclear if true stroke    Impression: possible CVA with no focal deficit  likely 2/2  in setting of uncontrolled DM      MRA H/N to look at the cerebral vasculature or can obtain CDs and can arrange for TCDs as an outpt.   Aspirin for secondary stroke prevention at this time.   Plavix 75 mg x 3 weeks  Atorvastatin 80, titrate the dose according to LDL.   TTE reviewed   DVT prophylaxis, Neurochecks  Permissive HTN up to 220/120 mmHg for first 24 hours after the symptom onset followed by gradual normotension.   HbA1C 10.2    LDL    75 y/o male with PMHx DM type 2, HTN, s/p R nephrectomy, Dementia presents to the ED due to weakness. Exam currently non-focal other than a mild right facial NIHSS 1. MRI brain read as an acute right pontine infarct. reviewed imaging unclear if true stroke    Impression: possible CVA with no focal deficit  likely 2/2  in setting of uncontrolled DM      MRA H/N to look at the cerebral vasculature or can obtain CDs and can arrange for TCDs as an outpt.   Aspirin for secondary stroke prevention at this time.   Plavix 75 mg x 3 weeks  Atorvastatin 80, titrate the dose according to LDL.   TTE reviewed   DVT prophylaxis, Neurochecks  Permissive HTN up to 220/120 mmHg for first 24 hours after the symptom onset followed by gradual normotension.   HbA1C 10.2    LDL

## 2022-02-26 NOTE — CONSULT NOTE ADULT - SUBJECTIVE AND OBJECTIVE BOX
Neurology consult    SHERWIN MINOR76yMale     Patient is a 76y old  Male who presents with a chief complaint of Possible Syncope, Hypoglycemia (2022 10:14)      HPI:           75 y/o male with PMHx DM type 2, HTN, s/p R nephrectomy, Dementia (noted cognitive impairment for the last few months to a yr per pt) presents to the ED due to weakness. Pt presently AAOX3 reports after using the rest room he suddenly "lost all energy" and gentle went/sat down to the floor (he did not fall). Pt reports he also became diaphoretic, states he was able to pick himself up by grabbing on the things/stair railings till made it to bed. Pt unsure however thinks he may have lost consciousness. Family reported patient has very forgetful, worsened over past month. Lives with wife and son, manages own appointments and medications. Son reports that patient's blood sugar is usually 200-400. Today, patient was home alone all day. Around 12noon patient sounded normal, didn't answer phone at 1 PM. Patient's son came home at 6Pm, patient was lying in bed with legs hanging down, slurred speech, unable to stand. Pt reports he may mixed up his medications. Pt denies cp, SOB, dizziness or palpitations, HA or unilateral weakness (2022 23:12)    Currently symproms improved no difficulty with language.  No vision loss or double vision.  No dizziness, vertigo or new hearing loss.  . No difficulty with swallowing.  No focal weakness.  No focal sensory changes.  No numbness or tingling in the bilateral lower extremities.  No difficulty with balance.  No difficulty with ambulation.    REVIEW OF SYSTEMS:    Constitutional: No fever, chills, fatigue, weakness  Eyes: no eye pain, visual disturbances, or discharge  ENT:  No difficulty hearing, tinnitus, vertigo; No sinus or throat pain  Neck: No pain or stiffness  Respiratory: No cough, dyspnea, wheezing   Cardiovascular: No chest pain, palpitations,   Gastrointestinal: No abdominal or epigastric pain. No nausea, vomiting  No diarrhea or constipation.   Genitourinary: No dysuria, frequency, hematuria or incontinence  Neurological: No headaches, lightheadedness, vertigo, numbness or tremors  Psychiatric: No depression, anxiety, mood swings or difficulty sleeping  Musculoskeletal: No joint pain or swelling; No muscle, back or extremity pain  Skin: No itching, burning, rashes or lesions   Lymph Nodes: No enlarged glands  Endocrine: No heat or cold intolerance; No hair loss, No h/o diabetes or thyroid dysfunction  Allergy and Immunologic: No hives or eczema    MEDICATIONS    aspirin enteric coated 81 milliGRAM(s) Oral daily  ATENolol  Tablet 25 milliGRAM(s) Oral daily  atorvastatin 80 milliGRAM(s) Oral at bedtime  dextrose 40% Gel 15 Gram(s) Oral once  dextrose 5%. 1000 milliLiter(s) IV Continuous <Continuous>  dextrose 5%. 1000 milliLiter(s) IV Continuous <Continuous>  dextrose 50% Injectable 25 Gram(s) IV Push once  dextrose 50% Injectable 12.5 Gram(s) IV Push once  dextrose 50% Injectable 25 Gram(s) IV Push once  finasteride 5 milliGRAM(s) Oral daily  glucagon  Injectable 1 milliGRAM(s) IntraMuscular once  heparin   Injectable 5000 Unit(s) SubCutaneous every 8 hours  hydrALAZINE 25 milliGRAM(s) Oral two times a day  insulin glargine Injectable (LANTUS) 10 Unit(s) SubCutaneous at bedtime  insulin lispro (ADMELOG) corrective regimen sliding scale   SubCutaneous three times a day before meals  insulin lispro (ADMELOG) corrective regimen sliding scale   SubCutaneous at bedtime  loratadine 10 milliGRAM(s) Oral daily  pantoprazole    Tablet 40 milliGRAM(s) Oral before breakfast  tamsulosin 0.4 milliGRAM(s) Oral at bedtime      PMH: Diabetes    HTN (hypertension)         PSH: History of kidney removal       FAMILY HISTORY:  No pertinent family history in first degree relatives        SOCIAL HISTORY:  No history of tobacco or alcohol use     Allergies    No Known Allergies    Intolerances        Height (cm): 165.1 ( @ 01:18)  Weight (kg): 77.1 ( @ 01:18)  BMI (kg/m2): 28.3 ( @ 01:18)    Vital Signs Last 24 Hrs  T(C): 36.3 (2022 16:18), Max: 36.9 (2022 00:16)  T(F): 97.3 (2022 16:18), Max: 98.4 (2022 00:16)  HR: 63 (2022 16:18) (60 - 67)  BP: 165/79 (2022 16:18) (153/82 - 186/96)  BP(mean): --  RR: 18 (2022 16:18) (18 - 19)  SpO2: 96% (2022 16:18) (96% - 100%)      On Neurological Examination:    Mental Status - Patient is alert, awake, oriented X3. fluent, names, no dysarthria no aphasia Follows commands well and able to answer questions appropriately. Mood and affect  normal    Cranial Nerves - PERRL, EOMI, VFF, V1-V3 intact, no gross facial asymmetry, tongue/uvula midline    Motor Exam -   no drift    Sensory    Intact to light touch and pinprick bilaterally    Coord: FTN intact bilaterally     Gait -  normal                 LABS:  CBC Full  -  ( 2022 22:20 )  WBC Count : 9.43 K/uL  RBC Count : 4.97 M/uL  Hemoglobin : 14.4 g/dL  Hematocrit : 44.2 %  Platelet Count - Automated : 143 K/uL  Mean Cell Volume : 88.9 fl  Mean Cell Hemoglobin : 29.0 pg  Mean Cell Hemoglobin Concentration : 32.6 g/dL  Auto Neutrophil # : 7.72 K/uL  Auto Lymphocyte # : 0.91 K/uL  Auto Monocyte # : 0.66 K/uL  Auto Eosinophil # : 0.08 K/uL  Auto Basophil # : 0.03 K/uL  Auto Neutrophil % : 81.9 %  Auto Lymphocyte % : 9.7 %  Auto Monocyte % : 7.0 %  Auto Eosinophil % : 0.8 %  Auto Basophil % : 0.3 %    Urinalysis Basic - ( 2022 07:00 )    Color: Yellow / Appearance: Clear / S.010 / pH: x  Gluc: x / Ketone: Negative  / Bili: Negative / Urobili: Negative mg/dL   Blood: x / Protein: 30 mg/dL / Nitrite: Negative   Leuk Esterase: Negative / RBC: 6-10 /HPF / WBC 0-2   Sq Epi: x / Non Sq Epi: Occasional / Bacteria: Few          135  |  106  |  30<H>  ----------------------------<  134<H>  4.9   |  25  |  1.76<H>    Ca    9.4      2022 22:20    TPro  6.9  /  Alb  2.9<L>  /  TBili  0.4  /  DBili  x   /  AST  20  /  ALT  25  /  AlkPhos  79  02-    LIVER FUNCTIONS - ( 2022 22:20 )  Alb: 2.9 g/dL / Pro: 6.9 gm/dL / ALK PHOS: 79 U/L / ALT: 25 U/L / AST: 20 U/L / GGT: x           Hemoglobin A1C:             RADIOLOGY  < from: MR Head No Cont (22 @ 12:55) >  IMPRESSION: Acute small anterior right pontine infarction.    < end of copied text >  < from: MR Head No Cont (22 @ 12:55) >  IMPRESSION: Acute small anterior right pontine infarction.    < end of copied text >  < from: TTE Echo Complete w/o Contrast w/ Doppler (22 @ 13:02) >    Summary:   1. Left ventricular ejection fraction, by visual estimation, is 65 to   70%.   2. Normal global left ventricular systolic function.   3. Spectral Doppler shows impaired relaxation pattern of left   ventricular myocardial filling (Grade I diastolic dysfunction).   4. Mild basal septal hypertrophy without clear evidence of LVOT   obstruction.   5. The left atrium is normal in size.   6. Thickening of the anterior and posterior mitral valve leaflets.   7. Trace mitral valve regurgitation.   8. Moderate aortic regurgitation.   9. Sclerotic aortic valve with normal opening.    < end of copied text >  A1C with Estimated Average Glucose Result: 10.2: Method: Immunoassay          Neurology consult    SHERWIN MINOR76yMale     Patient is a 76y old  Male who presents with a chief complaint of Possible Syncope, Hypoglycemia (2022 10:14)      HPI:           77 y/o male with PMHx DM type 2, HTN, s/p R nephrectomy, Dementia (noted cognitive impairment for the last few months to a yr per pt) presents to the ED due to weakness. Pt presently AAOX3 reports after using the rest room he suddenly "lost all energy" and gentle went/sat down to the floor (he did not fall). Pt reports he also became diaphoretic, states he was able to pick himself up by grabbing on the things/stair railings till made it to bed. Pt unsure however thinks he may have lost consciousness. Family reported patient has very forgetful, worsened over past month. Lives with wife and son, manages own appointments and medications. Son reports that patient's blood sugar is usually 200-400. Today, patient was home alone all day. Around 12noon patient sounded normal, didn't answer phone at 1 PM. Patient's son came home at 6Pm, patient was lying in bed with legs hanging down, slurred speech, unable to stand. Pt reports he may mixed up his medications. Pt denies cp, SOB, dizziness or palpitations, HA or unilateral weakness (2022 23:12)    Currently symproms improved no difficulty with language.  No vision loss or double vision.  No dizziness, vertigo or new hearing loss.  . No difficulty with swallowing.  No focal weakness.  No focal sensory changes.  No numbness or tingling in the bilateral lower extremities.  No difficulty with balance.  No difficulty with ambulation.    REVIEW OF SYSTEMS:    Constitutional: No fever, chills, fatigue, weakness  Eyes: no eye pain, visual disturbances, or discharge  ENT:  No difficulty hearing, tinnitus, vertigo; No sinus or throat pain  Neck: No pain or stiffness  Respiratory: No cough, dyspnea, wheezing   Cardiovascular: No chest pain, palpitations,   Gastrointestinal: No abdominal or epigastric pain. No nausea, vomiting  No diarrhea or constipation.   Genitourinary: No dysuria, frequency, hematuria or incontinence  Neurological: No headaches, lightheadedness, vertigo, numbness or tremors  Psychiatric: No depression, anxiety, mood swings or difficulty sleeping  Musculoskeletal: No joint pain or swelling; No muscle, back or extremity pain  Skin: No itching, burning, rashes or lesions   Lymph Nodes: No enlarged glands  Endocrine: No heat or cold intolerance; No hair loss, No h/o diabetes or thyroid dysfunction  Allergy and Immunologic: No hives or eczema    MEDICATIONS    aspirin enteric coated 81 milliGRAM(s) Oral daily  ATENolol  Tablet 25 milliGRAM(s) Oral daily  atorvastatin 80 milliGRAM(s) Oral at bedtime  dextrose 40% Gel 15 Gram(s) Oral once  dextrose 5%. 1000 milliLiter(s) IV Continuous <Continuous>  dextrose 5%. 1000 milliLiter(s) IV Continuous <Continuous>  dextrose 50% Injectable 25 Gram(s) IV Push once  dextrose 50% Injectable 12.5 Gram(s) IV Push once  dextrose 50% Injectable 25 Gram(s) IV Push once  finasteride 5 milliGRAM(s) Oral daily  glucagon  Injectable 1 milliGRAM(s) IntraMuscular once  heparin   Injectable 5000 Unit(s) SubCutaneous every 8 hours  hydrALAZINE 25 milliGRAM(s) Oral two times a day  insulin glargine Injectable (LANTUS) 10 Unit(s) SubCutaneous at bedtime  insulin lispro (ADMELOG) corrective regimen sliding scale   SubCutaneous three times a day before meals  insulin lispro (ADMELOG) corrective regimen sliding scale   SubCutaneous at bedtime  loratadine 10 milliGRAM(s) Oral daily  pantoprazole    Tablet 40 milliGRAM(s) Oral before breakfast  tamsulosin 0.4 milliGRAM(s) Oral at bedtime      PMH: Diabetes    HTN (hypertension)         PSH: History of kidney removal       FAMILY HISTORY:  No pertinent family history in first degree relatives        SOCIAL HISTORY:  No history of tobacco or alcohol use     Allergies    No Known Allergies    Intolerances        Height (cm): 165.1 ( @ 01:18)  Weight (kg): 77.1 ( @ 01:18)  BMI (kg/m2): 28.3 ( @ 01:18)    Vital Signs Last 24 Hrs  T(C): 36.3 (2022 16:18), Max: 36.9 (2022 00:16)  T(F): 97.3 (2022 16:18), Max: 98.4 (2022 00:16)  HR: 63 (2022 16:18) (60 - 67)  BP: 165/79 (2022 16:18) (153/82 - 186/96)  BP(mean): --  RR: 18 (2022 16:18) (18 - 19)  SpO2: 96% (2022 16:18) (96% - 100%)      On Neurological Examination:    Mental Status - Patient is alert, awake, oriented X3. fluent, names, no dysarthria no aphasia Follows commands well and able to answer questions appropriately. Mood and affect  normal    Cranial Nerves - PERRL, EOMI, VFF, V1-V3 intact mild right facial, tongue/uvula midline    Motor Exam -   no drift    Sensory    Intact to light touch and pinprick bilaterally    Coord: FTN intact bilaterally     Gait -  normal                 LABS:  CBC Full  -  ( 2022 22:20 )  WBC Count : 9.43 K/uL  RBC Count : 4.97 M/uL  Hemoglobin : 14.4 g/dL  Hematocrit : 44.2 %  Platelet Count - Automated : 143 K/uL  Mean Cell Volume : 88.9 fl  Mean Cell Hemoglobin : 29.0 pg  Mean Cell Hemoglobin Concentration : 32.6 g/dL  Auto Neutrophil # : 7.72 K/uL  Auto Lymphocyte # : 0.91 K/uL  Auto Monocyte # : 0.66 K/uL  Auto Eosinophil # : 0.08 K/uL  Auto Basophil # : 0.03 K/uL  Auto Neutrophil % : 81.9 %  Auto Lymphocyte % : 9.7 %  Auto Monocyte % : 7.0 %  Auto Eosinophil % : 0.8 %  Auto Basophil % : 0.3 %    Urinalysis Basic - ( 2022 07:00 )    Color: Yellow / Appearance: Clear / S.010 / pH: x  Gluc: x / Ketone: Negative  / Bili: Negative / Urobili: Negative mg/dL   Blood: x / Protein: 30 mg/dL / Nitrite: Negative   Leuk Esterase: Negative / RBC: 6-10 /HPF / WBC 0-2   Sq Epi: x / Non Sq Epi: Occasional / Bacteria: Few          135  |  106  |  30<H>  ----------------------------<  134<H>  4.9   |  25  |  1.76<H>    Ca    9.4      2022 22:20    TPro  6.9  /  Alb  2.9<L>  /  TBili  0.4  /  DBili  x   /  AST  20  /  ALT  25  /  AlkPhos  79  02-    LIVER FUNCTIONS - ( 2022 22:20 )  Alb: 2.9 g/dL / Pro: 6.9 gm/dL / ALK PHOS: 79 U/L / ALT: 25 U/L / AST: 20 U/L / GGT: x           Hemoglobin A1C:             RADIOLOGY  < from: MR Head No Cont (22 @ 12:55) >  IMPRESSION: Acute small anterior right pontine infarction.    < end of copied text >  < from: MR Head No Cont (22 @ 12:55) >  IMPRESSION: Acute small anterior right pontine infarction.    < end of copied text >  < from: TTE Echo Complete w/o Contrast w/ Doppler (22 @ 13:02) >    Summary:   1. Left ventricular ejection fraction, by visual estimation, is 65 to   70%.   2. Normal global left ventricular systolic function.   3. Spectral Doppler shows impaired relaxation pattern of left   ventricular myocardial filling (Grade I diastolic dysfunction).   4. Mild basal septal hypertrophy without clear evidence of LVOT   obstruction.   5. The left atrium is normal in size.   6. Thickening of the anterior and posterior mitral valve leaflets.   7. Trace mitral valve regurgitation.   8. Moderate aortic regurgitation.   9. Sclerotic aortic valve with normal opening.    < end of copied text >  A1C with Estimated Average Glucose Result: 10.2: Method: Immunoassay

## 2022-02-26 NOTE — PATIENT PROFILE ADULT - FALL HARM RISK - HARM RISK INTERVENTIONS
Assistance with ambulation/Assistance OOB with selected safe patient handling equipment/Communicate Risk of Fall with Harm to all staff/Discuss with provider need for PT consult/Monitor for mental status changes/Monitor gait and stability/Move patient closer to nurses' station/Provide patient with walking aids - walker, cane, crutches/Reinforce activity limits and safety measures with patient and family/Reorient to person, place and time as needed/Sit up slowly, dangle for a short time, stand at bedside before walking/Tailored Fall Risk Interventions/Toileting schedule using arm’s reach rule for commode and bathroom/Use of alarms - bed, chair and/or voice tab/Visual Cue: Yellow wristband and red socks/Bed in lowest position, wheels locked, appropriate side rails in place/Call bell, personal items and telephone in reach/Instruct patient to call for assistance before getting out of bed or chair/Non-slip footwear when patient is out of bed/[place] to call system/Physically safe environment - no spills, clutter or unnecessary equipment/Purposeful Proactive Rounding/Room/bathroom lighting operational, light cord in reach

## 2022-02-26 NOTE — PROGRESS NOTE ADULT - SUBJECTIVE AND OBJECTIVE BOX
Patient is a 76y old  Male who presents with a chief complaint of Possible Syncope, Hypoglycemia (2022 23:12)    INTERVAL HPI/OVERNIGHT EVENTS: Patients seen and examined at bedside this morning. No acute events overnight. Pt reports he's been having trouble with checking his glucose at home for the last 2 weeks. Usually gives himself the insulin but hasn't been checking. Feels like he's having floaters in his eyes. Trouble seeing at times. Has not seen an eye doc over a year. Not wearing glasses.     MEDICATIONS  (STANDING):  aspirin enteric coated 81 milliGRAM(s) Oral daily  ATENolol  Tablet 25 milliGRAM(s) Oral daily  atorvastatin 40 milliGRAM(s) Oral at bedtime  dextrose 40% Gel 15 Gram(s) Oral once  dextrose 5%. 1000 milliLiter(s) (50 mL/Hr) IV Continuous <Continuous>  dextrose 5%. 1000 milliLiter(s) (100 mL/Hr) IV Continuous <Continuous>  dextrose 50% Injectable 25 Gram(s) IV Push once  dextrose 50% Injectable 12.5 Gram(s) IV Push once  dextrose 50% Injectable 25 Gram(s) IV Push once  finasteride 5 milliGRAM(s) Oral daily  glucagon  Injectable 1 milliGRAM(s) IntraMuscular once  heparin   Injectable 5000 Unit(s) SubCutaneous every 8 hours  hydrALAZINE 25 milliGRAM(s) Oral two times a day  insulin lispro (ADMELOG) corrective regimen sliding scale   SubCutaneous three times a day before meals  insulin lispro (ADMELOG) corrective regimen sliding scale   SubCutaneous at bedtime  loratadine 10 milliGRAM(s) Oral daily  pantoprazole    Tablet 40 milliGRAM(s) Oral before breakfast  tamsulosin 0.4 milliGRAM(s) Oral at bedtime    MEDICATIONS  (PRN):    Allergies    No Known Allergies    Intolerances      REVIEW OF SYSTEMS:  All other systems reviewed and are negative    Vital Signs Last 24 Hrs  T(C): 36.6 (2022 04:53), Max: 36.9 (2022 00:16)  T(F): 97.8 (2022 04:53), Max: 98.4 (2022 00:16)  HR: 60 (2022 04:53) (60 - 72)  BP: 168/89 (2022 04:53) (153/82 - 168/89)  BP(mean): --  RR: 18 (2022 04:53) (16 - 19)  SpO2: 100% (2022 04:53) (96% - 100%)  Daily Height in cm: 165.1 (2022 01:18)    Daily Weight in k.1 (2022 04:53)  I&O's Summary    CAPILLARY BLOOD GLUCOSE      POCT Blood Glucose.: 102 mg/dL (2022 07:33)  POCT Blood Glucose.: 114 mg/dL (2022 23:16)  POCT Blood Glucose.: 147 mg/dL (2022 20:38)    PHYSICAL EXAM:  GENERAL: NAD, appears lethargic   HEAD:  Atraumatic, Normocephalic  EYES: EOMI, PERRLA, conjunctiva and sclera clear  ENMT: No tonsillar erythema, exudates, or enlargement; Moist mucous membranes, Good dentition, No lesions  NECK: Supple, No JVD, Normal thyroid  NERVOUS SYSTEM:  Alert & Oriented X2, Poor concentration; Motor Strength 5/5 B/L upper and lower extremities; DTRs 2+ intact and symmetric  CHEST/LUNG: diminished breath sounds b/l   HEART: Regular rate and rhythm; No murmurs, rubs, or gallops  ABDOMEN: Soft, Nontender, Nondistended; Bowel sounds present  EXTREMITIES:  2+ Peripheral Pulses, No clubbing, cyanosis, or edema  LYMPH: No lymphadenopathy noted  SKIN: No rashes or lesions    Labs                          14.4   9.43  )-----------( 143      ( 2022 22:20 )             44.2     02-    135  |  106  |  30<H>  ----------------------------<  134<H>  4.9   |  25  |  1.76<H>    Ca    9.4      2022 22:20    TPro  6.9  /  Alb  2.9<L>  /  TBili  0.4  /  DBili  x   /  AST  20  /  ALT  25  /  AlkPhos  79  02-          Urinalysis Basic - ( 2022 07:00 )    Color: Yellow / Appearance: Clear / S.010 / pH: x  Gluc: x / Ketone: Negative  / Bili: Negative / Urobili: Negative mg/dL   Blood: x / Protein: 30 mg/dL / Nitrite: Negative   Leuk Esterase: Negative / RBC: 6-10 /HPF / WBC 0-2   Sq Epi: x / Non Sq Epi: Occasional / Bacteria: Few

## 2022-02-26 NOTE — PROGRESS NOTE ADULT - ASSESSMENT
77 y/o male with PMHx DM type 2, HTN, s/p R nephrectomy, CKD stage 3, Dementia presents to the ED due to weakness admitted due to hypoglycemia with mismanagement of medications.     1) Hypoglycemia  - Likely etiology of slurred speech and possible syncope, however would obtain MRI, Echo  - pt reports he has been forgetful and may have taken too much of his medication; he's not sure  - now resolved, place on FS qAC and HS w/ SSI  - given cognitive impairment would advise family to regulate medications more closely.    - f/u A1c    2) HTN  - c/w Atenol  - F/U ECHO    3) CKD stage 3  - renal fxn stable    4) DVT ppx - HSQ  77 y/o male with PMHx DM type 2, HTN, s/p R nephrectomy, CKD stage 3, Dementia presents to the ED due to weakness admitted due to hypoglycemia with mismanagement of medications found to have a new acute small anterior right pontine infarction.     AMS 2/2 New acute small anterior right pontine infarction  Neurology consult  Atorvastatin 80 mg PO QD  Monitor BP  ECHO  C/W hydralazine, atenolol    Hypoglycemia w/ DM II  - Likely etiology of slurred speech and possible syncope, however would obtain MRI, Echo  - pt reports he has been forgetful and may have taken too much of his medication; he's not sure  - now resolved, place on FS qAC and HS w/ SSI  - given cognitive impairment would advise family to regulate medications more closely.    - f/u A1c    HTN  - c/w Atenol  - F/U ECHO    CKD stage 3  - renal fxn stable    4) DVT ppx - HSQ  77 y/o male with PMHx DM type 2, HTN, s/p R nephrectomy, CKD stage 3, Dementia presents to the ED due to weakness admitted due to hypoglycemia with mismanagement of medications found to have a new acute small anterior right pontine infarction.     AMS 2/2 New acute small anterior right pontine infarction  Neurology consult  Atorvastatin 80 mg PO QD  Monitor BP  ECHO  MRI reviewed  C/W hydralazine, atenolol    Hypoglycemia w/ DM II  - now resolved, place on FS qAC and HS w/ SSI  - given cognitive impairment would advise family to regulate medications more closely.    - f/u A1c    HTN  - c/w Atenol  - F/U ECHO    CKD stage 3  - renal fxn stable    4) DVT ppx - HSQ

## 2022-02-27 LAB
ANION GAP SERPL CALC-SCNC: 5 MMOL/L — SIGNIFICANT CHANGE UP (ref 5–17)
BUN SERPL-MCNC: 29 MG/DL — HIGH (ref 7–23)
C PEPTIDE SERPL-MCNC: 6.3 NG/ML — HIGH (ref 1.1–4.4)
CALCIUM SERPL-MCNC: 9.7 MG/DL — SIGNIFICANT CHANGE UP (ref 8.5–10.1)
CHLORIDE SERPL-SCNC: 107 MMOL/L — SIGNIFICANT CHANGE UP (ref 96–108)
CHOLEST SERPL-MCNC: 179 MG/DL — SIGNIFICANT CHANGE UP
CO2 SERPL-SCNC: 23 MMOL/L — SIGNIFICANT CHANGE UP (ref 22–31)
CREAT SERPL-MCNC: 1.84 MG/DL — HIGH (ref 0.5–1.3)
GLUCOSE BLDC GLUCOMTR-MCNC: 198 MG/DL — HIGH (ref 70–99)
GLUCOSE BLDC GLUCOMTR-MCNC: 301 MG/DL — HIGH (ref 70–99)
GLUCOSE BLDC GLUCOMTR-MCNC: 308 MG/DL — HIGH (ref 70–99)
GLUCOSE BLDC GLUCOMTR-MCNC: 339 MG/DL — HIGH (ref 70–99)
GLUCOSE SERPL-MCNC: 221 MG/DL — HIGH (ref 70–99)
HCT VFR BLD CALC: 50.4 % — HIGH (ref 39–50)
HCV AB S/CO SERPL IA: 0.15 S/CO — SIGNIFICANT CHANGE UP (ref 0–0.99)
HCV AB SERPL-IMP: SIGNIFICANT CHANGE UP
HDLC SERPL-MCNC: 35 MG/DL — LOW
HGB BLD-MCNC: 16.2 G/DL — SIGNIFICANT CHANGE UP (ref 13–17)
LIPID PNL WITH DIRECT LDL SERPL: 121 MG/DL — HIGH
MCHC RBC-ENTMCNC: 29.2 PG — SIGNIFICANT CHANGE UP (ref 27–34)
MCHC RBC-ENTMCNC: 32.1 G/DL — SIGNIFICANT CHANGE UP (ref 32–36)
MCV RBC AUTO: 91 FL — SIGNIFICANT CHANGE UP (ref 80–100)
NON HDL CHOLESTEROL: 144 MG/DL — HIGH
NRBC # BLD: 0 /100 WBCS — SIGNIFICANT CHANGE UP (ref 0–0)
PLATELET # BLD AUTO: 144 K/UL — LOW (ref 150–400)
POTASSIUM SERPL-MCNC: 4.5 MMOL/L — SIGNIFICANT CHANGE UP (ref 3.5–5.3)
POTASSIUM SERPL-SCNC: 4.5 MMOL/L — SIGNIFICANT CHANGE UP (ref 3.5–5.3)
RBC # BLD: 5.54 M/UL — SIGNIFICANT CHANGE UP (ref 4.2–5.8)
RBC # FLD: 12.9 % — SIGNIFICANT CHANGE UP (ref 10.3–14.5)
SODIUM SERPL-SCNC: 135 MMOL/L — SIGNIFICANT CHANGE UP (ref 135–145)
T4 AB SER-ACNC: 9.2 UG/DL — SIGNIFICANT CHANGE UP (ref 4.6–12)
TRIGL SERPL-MCNC: 115 MG/DL — SIGNIFICANT CHANGE UP
TSH SERPL-MCNC: 2.03 UU/ML — SIGNIFICANT CHANGE UP (ref 0.36–3.74)
WBC # BLD: 8.08 K/UL — SIGNIFICANT CHANGE UP (ref 3.8–10.5)
WBC # FLD AUTO: 8.08 K/UL — SIGNIFICANT CHANGE UP (ref 3.8–10.5)

## 2022-02-27 PROCEDURE — 70547 MR ANGIOGRAPHY NECK W/O DYE: CPT | Mod: 26

## 2022-02-27 PROCEDURE — 70544 MR ANGIOGRAPHY HEAD W/O DYE: CPT | Mod: 26

## 2022-02-27 PROCEDURE — 99233 SBSQ HOSP IP/OBS HIGH 50: CPT

## 2022-02-27 RX ORDER — INSULIN GLARGINE 100 [IU]/ML
10 INJECTION, SOLUTION SUBCUTANEOUS EVERY MORNING
Refills: 0 | Status: DISCONTINUED | OUTPATIENT
Start: 2022-02-27 | End: 2022-03-01

## 2022-02-27 RX ORDER — INSULIN LISPRO 100/ML
VIAL (ML) SUBCUTANEOUS
Refills: 0 | Status: DISCONTINUED | OUTPATIENT
Start: 2022-02-27 | End: 2022-03-01

## 2022-02-27 RX ORDER — AMLODIPINE BESYLATE 2.5 MG/1
10 TABLET ORAL DAILY
Refills: 0 | Status: DISCONTINUED | OUTPATIENT
Start: 2022-02-27 | End: 2022-03-01

## 2022-02-27 RX ORDER — INSULIN GLARGINE 100 [IU]/ML
15 INJECTION, SOLUTION SUBCUTANEOUS AT BEDTIME
Refills: 0 | Status: DISCONTINUED | OUTPATIENT
Start: 2022-02-27 | End: 2022-02-27

## 2022-02-27 RX ORDER — CLOPIDOGREL BISULFATE 75 MG/1
75 TABLET, FILM COATED ORAL DAILY
Refills: 0 | Status: DISCONTINUED | OUTPATIENT
Start: 2022-02-27 | End: 2022-03-01

## 2022-02-27 RX ORDER — INSULIN LISPRO 100/ML
3 VIAL (ML) SUBCUTANEOUS
Refills: 0 | Status: DISCONTINUED | OUTPATIENT
Start: 2022-02-27 | End: 2022-03-01

## 2022-02-27 RX ORDER — QUETIAPINE FUMARATE 200 MG/1
25 TABLET, FILM COATED ORAL ONCE
Refills: 0 | Status: COMPLETED | OUTPATIENT
Start: 2022-02-27 | End: 2022-02-27

## 2022-02-27 RX ADMIN — Medication 3 UNIT(S): at 16:28

## 2022-02-27 RX ADMIN — Medication 4: at 11:39

## 2022-02-27 RX ADMIN — QUETIAPINE FUMARATE 25 MILLIGRAM(S): 200 TABLET, FILM COATED ORAL at 22:18

## 2022-02-27 RX ADMIN — HEPARIN SODIUM 5000 UNIT(S): 5000 INJECTION INTRAVENOUS; SUBCUTANEOUS at 14:00

## 2022-02-27 RX ADMIN — PANTOPRAZOLE SODIUM 40 MILLIGRAM(S): 20 TABLET, DELAYED RELEASE ORAL at 05:15

## 2022-02-27 RX ADMIN — TAMSULOSIN HYDROCHLORIDE 0.4 MILLIGRAM(S): 0.4 CAPSULE ORAL at 21:07

## 2022-02-27 RX ADMIN — ATORVASTATIN CALCIUM 80 MILLIGRAM(S): 80 TABLET, FILM COATED ORAL at 21:07

## 2022-02-27 RX ADMIN — Medication 81 MILLIGRAM(S): at 11:40

## 2022-02-27 RX ADMIN — LORATADINE 10 MILLIGRAM(S): 10 TABLET ORAL at 11:40

## 2022-02-27 RX ADMIN — Medication 25 MILLIGRAM(S): at 05:14

## 2022-02-27 RX ADMIN — FINASTERIDE 5 MILLIGRAM(S): 5 TABLET, FILM COATED ORAL at 11:40

## 2022-02-27 RX ADMIN — Medication 5: at 16:27

## 2022-02-27 RX ADMIN — AMLODIPINE BESYLATE 10 MILLIGRAM(S): 2.5 TABLET ORAL at 21:07

## 2022-02-27 RX ADMIN — HEPARIN SODIUM 5000 UNIT(S): 5000 INJECTION INTRAVENOUS; SUBCUTANEOUS at 05:15

## 2022-02-27 RX ADMIN — Medication 1: at 07:35

## 2022-02-27 RX ADMIN — Medication 25 MILLIGRAM(S): at 18:16

## 2022-02-27 RX ADMIN — HEPARIN SODIUM 5000 UNIT(S): 5000 INJECTION INTRAVENOUS; SUBCUTANEOUS at 21:49

## 2022-02-27 RX ADMIN — INSULIN GLARGINE 15 UNIT(S): 100 INJECTION, SOLUTION SUBCUTANEOUS at 21:11

## 2022-02-27 RX ADMIN — Medication 2: at 22:11

## 2022-02-27 RX ADMIN — CLOPIDOGREL BISULFATE 75 MILLIGRAM(S): 75 TABLET, FILM COATED ORAL at 11:40

## 2022-02-27 NOTE — PROGRESS NOTE ADULT - SUBJECTIVE AND OBJECTIVE BOX
Patient is a 76y old  Male who presents with a chief complaint of Possible Syncope, Hypoglycemia (2022 20:52)    INTERVAL HPI/OVERNIGHT EVENTS: Patients seen and examined at bedside this morning. No acute events overnight. Pt reports he is feeling better. States he really needs to go home to do paperwork. Appears confused.     MEDICATIONS  (STANDING):  aspirin enteric coated 81 milliGRAM(s) Oral daily  ATENolol  Tablet 25 milliGRAM(s) Oral daily  atorvastatin 80 milliGRAM(s) Oral at bedtime  clopidogrel Tablet 75 milliGRAM(s) Oral daily  dextrose 40% Gel 15 Gram(s) Oral once  dextrose 5%. 1000 milliLiter(s) (50 mL/Hr) IV Continuous <Continuous>  dextrose 5%. 1000 milliLiter(s) (100 mL/Hr) IV Continuous <Continuous>  dextrose 50% Injectable 25 Gram(s) IV Push once  dextrose 50% Injectable 12.5 Gram(s) IV Push once  dextrose 50% Injectable 25 Gram(s) IV Push once  finasteride 5 milliGRAM(s) Oral daily  glucagon  Injectable 1 milliGRAM(s) IntraMuscular once  heparin   Injectable 5000 Unit(s) SubCutaneous every 8 hours  hydrALAZINE 25 milliGRAM(s) Oral two times a day  insulin glargine Injectable (LANTUS) 10 Unit(s) SubCutaneous at bedtime  insulin lispro (ADMELOG) corrective regimen sliding scale   SubCutaneous three times a day before meals  insulin lispro (ADMELOG) corrective regimen sliding scale   SubCutaneous at bedtime  loratadine 10 milliGRAM(s) Oral daily  pantoprazole    Tablet 40 milliGRAM(s) Oral before breakfast  tamsulosin 0.4 milliGRAM(s) Oral at bedtime    MEDICATIONS  (PRN):    Allergies    No Known Allergies    Intolerances      REVIEW OF SYSTEMS:  All other systems reviewed and are negative    Vital Signs Last 24 Hrs  T(C): 36.7 (2022 05:34), Max: 36.7 (2022 05:34)  T(F): 98 (2022 05:34), Max: 98 (2022 05:34)  HR: 52 (2022 05:34) (52 - 64)  BP: 147/82 (2022 05:34) (147/82 - 186/96)  BP(mean): --  RR: 16 (2022 05:34) (16 - 18)  SpO2: 97% (2022 05:34) (95% - 97%)  Daily     Daily Weight in k.2 (2022 05:34)  I&O's Summary    2022 07:01  -  2022 07:00  --------------------------------------------------------  IN: 460 mL / OUT: 0 mL / NET: 460 mL      CAPILLARY BLOOD GLUCOSE      POCT Blood Glucose.: 198 mg/dL (2022 07:10)  POCT Blood Glucose.: 290 mg/dL (2022 21:07)  POCT Blood Glucose.: 281 mg/dL (2022 16:48)  POCT Blood Glucose.: 234 mg/dL (2022 11:26)    PHYSICAL EXAM:  GENERAL: NAD, well-groomed, well-developed, appears confused  HEAD:  Atraumatic, Normocephalic  EYES: EOMI, PERRLA, conjunctiva and sclera clear  ENMT: No tonsillar erythema, exudates, or enlargement; Moist mucous membranes, Good dentition, No lesions  NECK: Supple, No JVD, Normal thyroid  NERVOUS SYSTEM:  Alert & Oriented X3, Poor concentration; Motor Strength 5/5 B/L upper and lower extremities; DTRs 2+ intact and symmetric  CHEST/LUNG: Clear to percussion bilaterally; Diminished breath sounds b/l.   HEART: Regular rate and rhythm; No murmurs, rubs, or gallops  ABDOMEN: Soft, Nontender, Nondistended; Bowel sounds present  EXTREMITIES:  2+ Peripheral Pulses, No clubbing, cyanosis, or edema  LYMPH: No lymphadenopathy noted  SKIN: No rashes or lesions    Labs                          16.2   8.08  )-----------( 144      ( 2022 07:30 )             50.4         135  |  107  |  29<H>  ----------------------------<  221<H>  4.5   |  23  |  1.84<H>    Ca    9.7      2022 07:30    TPro  6.9  /  Alb  2.9<L>  /  TBili  0.4  /  DBili  x   /  AST  20  /  ALT  25  /  AlkPhos  79  02-25          Urinalysis Basic - ( 2022 07:00 )    Color: Yellow / Appearance: Clear / S.010 / pH: x  Gluc: x / Ketone: Negative  / Bili: Negative / Urobili: Negative mg/dL   Blood: x / Protein: 30 mg/dL / Nitrite: Negative   Leuk Esterase: Negative / RBC: 6-10 /HPF / WBC 0-2   Sq Epi: x / Non Sq Epi: Occasional / Bacteria: Few

## 2022-02-27 NOTE — PROGRESS NOTE ADULT - ASSESSMENT
77 y/o male with PMHx DM type 2, HTN, s/p R nephrectomy, CKD stage 3, Dementia presents to the ED due to weakness admitted due to hypoglycemia with mismanagement of medications found to have a new acute small anterior right pontine infarction.     AMS 2/2 New acute small anterior right pontine infarction  Neurology consult  Atorvastatin 80 mg PO QD  Plavix 75 mg PO QD X3 months.   Monitor BP  MRA head/neck  ECHO  MRI reviewed  C/W hydralazine, atenolol    Hypoglycemia w/ DM II  - now resolved, place on FS qAC and HS w/ SSI  - given cognitive impairment would advise family to regulate medications more closely.    - f/u A1c    HTN  - c/w Atenol  - < from: TTE Echo Complete w/o Contrast w/ Doppler (02.26.22 @ 13:02) >  Summary:   1. Left ventricular ejection fraction, by visual estimation, is 65 to   70%.   2. Normal global left ventricular systolic function.   3. Spectral Doppler shows impaired relaxation pattern of left   ventricular myocardial filling (Grade I diastolic dysfunction).   4. Mild basal septal hypertrophy without clear evidence of LVOT   obstruction.   5. The left atrium is normal in size.   6. Thickening of the anterior and posterior mitral valve leaflets.   7. Trace mitral valve regurgitation.   8. Moderate aortic regurgitation.   9. Sclerotic aortic valve with normal opening.    CKD stage 3  - renal fxn stable    4) DVT ppx - HSQ

## 2022-02-28 ENCOUNTER — TRANSCRIPTION ENCOUNTER (OUTPATIENT)
Age: 77
End: 2022-02-28

## 2022-02-28 LAB
ANION GAP SERPL CALC-SCNC: 5 MMOL/L — SIGNIFICANT CHANGE UP (ref 5–17)
BUN SERPL-MCNC: 31 MG/DL — HIGH (ref 7–23)
CALCIUM SERPL-MCNC: 9.4 MG/DL — SIGNIFICANT CHANGE UP (ref 8.5–10.1)
CHLORIDE SERPL-SCNC: 108 MMOL/L — SIGNIFICANT CHANGE UP (ref 96–108)
CO2 SERPL-SCNC: 23 MMOL/L — SIGNIFICANT CHANGE UP (ref 22–31)
CREAT SERPL-MCNC: 1.83 MG/DL — HIGH (ref 0.5–1.3)
GLUCOSE BLDC GLUCOMTR-MCNC: 132 MG/DL — HIGH (ref 70–99)
GLUCOSE BLDC GLUCOMTR-MCNC: 137 MG/DL — HIGH (ref 70–99)
GLUCOSE BLDC GLUCOMTR-MCNC: 155 MG/DL — HIGH (ref 70–99)
GLUCOSE BLDC GLUCOMTR-MCNC: 206 MG/DL — HIGH (ref 70–99)
GLUCOSE BLDC GLUCOMTR-MCNC: 217 MG/DL — HIGH (ref 70–99)
GLUCOSE BLDC GLUCOMTR-MCNC: 255 MG/DL — HIGH (ref 70–99)
GLUCOSE SERPL-MCNC: 166 MG/DL — HIGH (ref 70–99)
HCT VFR BLD CALC: 41.9 % — SIGNIFICANT CHANGE UP (ref 39–50)
HGB BLD-MCNC: 14 G/DL — SIGNIFICANT CHANGE UP (ref 13–17)
MCHC RBC-ENTMCNC: 29.1 PG — SIGNIFICANT CHANGE UP (ref 27–34)
MCHC RBC-ENTMCNC: 33.4 G/DL — SIGNIFICANT CHANGE UP (ref 32–36)
MCV RBC AUTO: 87.1 FL — SIGNIFICANT CHANGE UP (ref 80–100)
NRBC # BLD: 0 /100 WBCS — SIGNIFICANT CHANGE UP (ref 0–0)
PLATELET # BLD AUTO: 130 K/UL — LOW (ref 150–400)
POTASSIUM SERPL-MCNC: 4 MMOL/L — SIGNIFICANT CHANGE UP (ref 3.5–5.3)
POTASSIUM SERPL-SCNC: 4 MMOL/L — SIGNIFICANT CHANGE UP (ref 3.5–5.3)
RBC # BLD: 4.81 M/UL — SIGNIFICANT CHANGE UP (ref 4.2–5.8)
RBC # FLD: 12.9 % — SIGNIFICANT CHANGE UP (ref 10.3–14.5)
SODIUM SERPL-SCNC: 136 MMOL/L — SIGNIFICANT CHANGE UP (ref 135–145)
WBC # BLD: 7.23 K/UL — SIGNIFICANT CHANGE UP (ref 3.8–10.5)
WBC # FLD AUTO: 7.23 K/UL — SIGNIFICANT CHANGE UP (ref 3.8–10.5)

## 2022-02-28 PROCEDURE — 99233 SBSQ HOSP IP/OBS HIGH 50: CPT

## 2022-02-28 RX ORDER — CLOPIDOGREL BISULFATE 75 MG/1
1 TABLET, FILM COATED ORAL
Qty: 30 | Refills: 0
Start: 2022-02-28 | End: 2022-03-29

## 2022-02-28 RX ORDER — FINASTERIDE 5 MG/1
1 TABLET, FILM COATED ORAL
Qty: 0 | Refills: 0 | DISCHARGE

## 2022-02-28 RX ORDER — ASPIRIN/CALCIUM CARB/MAGNESIUM 324 MG
0 TABLET ORAL
Qty: 0 | Refills: 1 | DISCHARGE

## 2022-02-28 RX ORDER — INSULIN GLARGINE 100 [IU]/ML
0 INJECTION, SOLUTION SUBCUTANEOUS
Qty: 0 | Refills: 2 | DISCHARGE

## 2022-02-28 RX ORDER — LATANOPROST 0.05 MG/ML
0 SOLUTION/ DROPS OPHTHALMIC; TOPICAL
Qty: 0 | Refills: 0 | DISCHARGE

## 2022-02-28 RX ORDER — ASPIRIN/CALCIUM CARB/MAGNESIUM 324 MG
1 TABLET ORAL
Qty: 30 | Refills: 0
Start: 2022-02-28 | End: 2022-03-29

## 2022-02-28 RX ORDER — LATANOPROST 0.05 MG/ML
1 SOLUTION/ DROPS OPHTHALMIC; TOPICAL
Qty: 30 | Refills: 0
Start: 2022-02-28 | End: 2022-03-29

## 2022-02-28 RX ORDER — HYDRALAZINE HCL 50 MG
1 TABLET ORAL
Qty: 60 | Refills: 0
Start: 2022-02-28 | End: 2022-03-29

## 2022-02-28 RX ORDER — DOCUSATE SODIUM 100 MG
0 CAPSULE ORAL
Qty: 0 | Refills: 0 | DISCHARGE

## 2022-02-28 RX ORDER — INSULIN LISPRO 100/ML
0 VIAL (ML) SUBCUTANEOUS
Qty: 0 | Refills: 0 | DISCHARGE

## 2022-02-28 RX ORDER — OMEPRAZOLE 10 MG/1
0 CAPSULE, DELAYED RELEASE ORAL
Qty: 0 | Refills: 0 | DISCHARGE

## 2022-02-28 RX ORDER — METOPROLOL TARTRATE 50 MG
1 TABLET ORAL
Qty: 30 | Refills: 0
Start: 2022-02-28 | End: 2022-03-29

## 2022-02-28 RX ORDER — GABAPENTIN 400 MG/1
1 CAPSULE ORAL
Qty: 0 | Refills: 0 | DISCHARGE

## 2022-02-28 RX ORDER — ATENOLOL 25 MG/1
1 TABLET ORAL
Qty: 0 | Refills: 0 | DISCHARGE

## 2022-02-28 RX ORDER — ATORVASTATIN CALCIUM 80 MG/1
1 TABLET, FILM COATED ORAL
Qty: 30 | Refills: 0
Start: 2022-02-28 | End: 2022-03-29

## 2022-02-28 RX ORDER — NYSTATIN CREAM 100000 [USP'U]/G
0 CREAM TOPICAL
Qty: 0 | Refills: 0 | DISCHARGE

## 2022-02-28 RX ORDER — TAMSULOSIN HYDROCHLORIDE 0.4 MG/1
1 CAPSULE ORAL
Qty: 30 | Refills: 0
Start: 2022-02-28 | End: 2022-03-29

## 2022-02-28 RX ORDER — LEVOCETIRIZINE DIHYDROCHLORIDE 0.5 MG/ML
1 SOLUTION ORAL
Qty: 0 | Refills: 0 | DISCHARGE

## 2022-02-28 RX ORDER — ENOXAPARIN SODIUM 100 MG/ML
25 INJECTION SUBCUTANEOUS
Qty: 5 | Refills: 0
Start: 2022-02-28 | End: 2022-03-29

## 2022-02-28 RX ORDER — FINASTERIDE 5 MG/1
1 TABLET, FILM COATED ORAL
Qty: 30 | Refills: 0
Start: 2022-02-28 | End: 2022-03-29

## 2022-02-28 RX ORDER — INSULIN LISPRO 100/ML
3 VIAL (ML) SUBCUTANEOUS
Qty: 3 | Refills: 0
Start: 2022-02-28 | End: 2022-03-29

## 2022-02-28 RX ORDER — TAMSULOSIN HYDROCHLORIDE 0.4 MG/1
1 CAPSULE ORAL
Qty: 0 | Refills: 0 | DISCHARGE

## 2022-02-28 RX ORDER — ROSUVASTATIN CALCIUM 5 MG/1
1 TABLET ORAL
Qty: 0 | Refills: 0 | DISCHARGE

## 2022-02-28 RX ORDER — PANTOPRAZOLE SODIUM 20 MG/1
1 TABLET, DELAYED RELEASE ORAL
Qty: 30 | Refills: 0
Start: 2022-02-28 | End: 2022-03-29

## 2022-02-28 RX ADMIN — Medication 3 UNIT(S): at 18:37

## 2022-02-28 RX ADMIN — Medication 3: at 18:39

## 2022-02-28 RX ADMIN — HEPARIN SODIUM 5000 UNIT(S): 5000 INJECTION INTRAVENOUS; SUBCUTANEOUS at 06:43

## 2022-02-28 RX ADMIN — HEPARIN SODIUM 5000 UNIT(S): 5000 INJECTION INTRAVENOUS; SUBCUTANEOUS at 21:28

## 2022-02-28 RX ADMIN — AMLODIPINE BESYLATE 10 MILLIGRAM(S): 2.5 TABLET ORAL at 06:43

## 2022-02-28 RX ADMIN — ATORVASTATIN CALCIUM 80 MILLIGRAM(S): 80 TABLET, FILM COATED ORAL at 21:28

## 2022-02-28 RX ADMIN — HEPARIN SODIUM 5000 UNIT(S): 5000 INJECTION INTRAVENOUS; SUBCUTANEOUS at 18:13

## 2022-02-28 RX ADMIN — Medication 3 UNIT(S): at 11:55

## 2022-02-28 RX ADMIN — ATENOLOL 25 MILLIGRAM(S): 25 TABLET ORAL at 06:43

## 2022-02-28 RX ADMIN — PANTOPRAZOLE SODIUM 40 MILLIGRAM(S): 20 TABLET, DELAYED RELEASE ORAL at 09:02

## 2022-02-28 RX ADMIN — Medication 25 MILLIGRAM(S): at 18:20

## 2022-02-28 RX ADMIN — INSULIN GLARGINE 10 UNIT(S): 100 INJECTION, SOLUTION SUBCUTANEOUS at 09:03

## 2022-02-28 RX ADMIN — Medication 25 MILLIGRAM(S): at 06:43

## 2022-02-28 RX ADMIN — FINASTERIDE 5 MILLIGRAM(S): 5 TABLET, FILM COATED ORAL at 12:06

## 2022-02-28 RX ADMIN — Medication 4: at 11:54

## 2022-02-28 RX ADMIN — CLOPIDOGREL BISULFATE 75 MILLIGRAM(S): 75 TABLET, FILM COATED ORAL at 12:04

## 2022-02-28 RX ADMIN — Medication 2: at 09:05

## 2022-02-28 RX ADMIN — Medication 81 MILLIGRAM(S): at 12:04

## 2022-02-28 RX ADMIN — TAMSULOSIN HYDROCHLORIDE 0.4 MILLIGRAM(S): 0.4 CAPSULE ORAL at 21:28

## 2022-02-28 RX ADMIN — Medication 3 UNIT(S): at 09:04

## 2022-02-28 NOTE — DISCHARGE NOTE PROVIDER - HOSPITAL COURSE
77 y/o male with PMHx DM type 2, HTN, s/p R nephrectomy, CKD stage 3, Dementia presents to the ED due to weakness admitted due to hypoglycemia with mismanagement of medications found to have a new acute small anterior right pontine infarction. Patient is stable medically for discharge with home care. Please follow up with eye doctor and neurologist upon discharge.     AMS 2/2 New acute small anterior right pontine infarction  Neurology consult  Atorvastatin 80 mg PO QD  Plavix 75 mg PO QD X3 months.   Monitor BP  MRA head/neck  ECHO  MRI reviewed  C/W hydralazine, atenolol    Hypoglycemia w/ DM II  - now resolved, place on FS qAC and HS w/ SSI  - given cognitive impairment would advise family to regulate medications more closely.    - A1c elevated    HTN  - will change atenolol to carvedilol   - < from: TTE Echo Complete w/o Contrast w/ Doppler (02.26.22 @ 13:02) >  Summary:   1. Left ventricular ejection fraction, by visual estimation, is 65 to   70%.   2. Normal global left ventricular systolic function.   3. Spectral Doppler shows impaired relaxation pattern of left   ventricular myocardial filling (Grade I diastolic dysfunction).   4. Mild basal septal hypertrophy without clear evidence of LVOT   obstruction.   5. The left atrium is normal in size.   6. Thickening of the anterior and posterior mitral valve leaflets.   7. Trace mitral valve regurgitation.   8. Moderate aortic regurgitation.   9. Sclerotic aortic valve with normal opening.    CKD stage 3  - renal fxn stable    4) DVT ppx - HSQ 75 y/o male with PMHx DM type 2, HTN, s/p R nephrectomy, CKD stage 3, Dementia presents to the ED due to weakness admitted due to hypoglycemia with mismanagement of medications found to have a new acute small anterior right pontine infarction. Patient is stable medically for discharge with home care. Please follow up with eye doctor and neurologist upon discharge.     AMS 2/2 New acute small anterior right pontine infarction  Neurology consult  Atorvastatin 80 mg PO QD  Plavix 75 mg PO QD X3 months.   Monitor BP  MRA head/neck  ECHO  MRI reviewed  D/C hydralazine, atenolol  Start amlodipine and metoprolol ER 25 mg PO QD     Hypoglycemia w/ DM II  - now resolved, place on FS qAC and HS w/ SSI  - given cognitive impairment would advise family to regulate medications more closely.    - A1c elevated    HTN  - will change atenolol to carvedilol   - < from: TTE Echo Complete w/o Contrast w/ Doppler (02.26.22 @ 13:02) >  Summary:   1. Left ventricular ejection fraction, by visual estimation, is 65 to   70%.   2. Normal global left ventricular systolic function.   3. Spectral Doppler shows impaired relaxation pattern of left   ventricular myocardial filling (Grade I diastolic dysfunction).   4. Mild basal septal hypertrophy without clear evidence of LVOT   obstruction.   5. The left atrium is normal in size.   6. Thickening of the anterior and posterior mitral valve leaflets.   7. Trace mitral valve regurgitation.   8. Moderate aortic regurgitation.   9. Sclerotic aortic valve with normal opening.    CKD stage 3  - renal fxn stable    Asymptomatic UTI  Ecoli in urine culture. pt asymptomatic. Will hold abx    4) DVT ppx - HSQ

## 2022-02-28 NOTE — DISCHARGE NOTE PROVIDER - ATTENDING DISCHARGE PHYSICAL EXAMINATION:
PHYSICAL EXAM:  GENERAL: NAD, well-groomed, well-developed, appears confused  HEAD:  Atraumatic, Normocephalic  EYES: EOMI, PERRLA, conjunctiva and sclera clear  ENMT: No tonsillar erythema, exudates, or enlargement; Moist mucous membranes, Good dentition, No lesions  NECK: Supple, No JVD, Normal thyroid  NERVOUS SYSTEM:  Alert & Oriented X3, Poor concentration; Motor Strength 5/5 B/L upper and lower extremities; DTRs 2+ intact and symmetric  CHEST/LUNG: Clear to percussion bilaterally; Diminished breath sounds b/l.   HEART: Regular rate and rhythm; No murmurs, rubs, or gallops  ABDOMEN: Soft, Nontender, Nondistended; Bowel sounds present  EXTREMITIES:  2+ Peripheral Pulses, No clubbing, cyanosis, or edema  LYMPH: No lymphadenopathy noted  SKIN: No rashes or lesions

## 2022-02-28 NOTE — DISCHARGE NOTE NURSING/CASE MANAGEMENT/SOCIAL WORK - PATIENT PORTAL LINK FT
You can access the FollowMyHealth Patient Portal offered by [facility] by registering at the following website: [email or web address]. By joining [employer]’s FollowMyHealth portal, you will also be able to view your health information using other applications (apps) compatible with our system.

## 2022-02-28 NOTE — PROGRESS NOTE ADULT - ASSESSMENT
77 y/o male with PMHx DM type 2, HTN, s/p R nephrectomy, Dementia presents to the ED due to weakness. Exam currently non-focal other than a mild right facial NIHSS 1. MRI brain read as an acute right pontine infarct. 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   Plavix 75 mg x 3 weeks  Atorvastatin, titrate the dose according to LDL.   TTE reviewed   DVT prophylaxis, Neurochecks  HbA1C 10.2      -No further inpatient Neurologic workup at this time  Can follow up with Neurology, Dr. Vikram Thakkar at [phone]

## 2022-02-28 NOTE — DISCHARGE NOTE NURSING/CASE MANAGEMENT/SOCIAL WORK - NSDCPEFALRISK_GEN_ALL_CORE
For information on Fall & Injury Prevention, visit: [email or web address] OR  [email or web address] OR  https://www.cdc.gov/steadi/patient.html

## 2022-02-28 NOTE — PHYSICAL THERAPY INITIAL EVALUATION ADULT - ADDITIONAL COMMENTS
Pt lives in a pvt house with wife and son with 4 steps to enter and 13 steps to 2nd floor bedroom. Pt has a SC but doesn't use it.

## 2022-02-28 NOTE — PHYSICAL THERAPY INITIAL EVALUATION ADULT - PERTINENT HX OF CURRENT PROBLEM, REHAB EVAL
Pt is a 77 y/o male with PMHx DM type 2, HTN, s/p R nephrectomy, Dementia, presented to the ED due to weakness while using the rest room he suddenly "lost all energy" and gentle sat down to the floor (he did not fall). Pt reports he also became diaphoretic, states he was able to pick himself up by grabbing on the stair railings till made it to bed.

## 2022-02-28 NOTE — DISCHARGE NOTE PROVIDER - NSDCMRMEDTOKEN_GEN_ALL_CORE_FT
ASPIRIN EC 81 MG TABLET: TAKE 1 TABLET BY MOUTH EVERY DAY  atenolol 25 mg oral tablet: 1 tab(s) orally once a day  docusate potassium 100 mg oral capsule:   finasteride 5 mg oral tablet: 1 tab(s) orally once a day  gabapentin 400 mg oral capsule: 1 cap(s) orally 3 times a day  HUMALOG 100 UNIT/ML KWIKPEN: INJECT 8 UNITS 3 TIMES A DAY  LANTUS SOLOSTAR 100 UNIT/ML: INJECT 25 UNITS SUBCUTANEOUSLY AT BEDTIME  LATANOPROST 0.005% EYE DROPS: 1 DROP INTO AFFECTED EYE ONE TIME  levocetirizine 5 mg oral tablet: 1 tab(s) orally once a day (in the evening)  NYSTATIN 100,000 UNIT/GM OINT: APPLY 2 OR 3 TIMES A DAY TO AFFECTED AREA  OMEPRAZOLE  20 MG CPDR:   rosuvastatin 40 mg oral tablet: 1 tab(s) orally once a day  tamsulosin 0.4 mg oral capsule: 1 cap(s) orally once a day  Tylenol 325 mg oral tablet: 2 tab(s) orally every 6 hours as needed for pain   aspirin 81 mg oral delayed release tablet: 1 tab(s) orally once a day  atorvastatin 80 mg oral tablet: 1 tab(s) orally once a day (at bedtime)  clopidogrel 75 mg oral tablet: 1 tab(s) orally once a day  finasteride 5 mg oral tablet: 1 tab(s) orally once a day  glucometer (per patient&#x27;s insurance): Test blood sugars four times a day. Dispense #1 glucometer.  HumaLOG KwikPen 100 units/mL injectable solution: 3 unit(s) subcutaneous 3 times a day (with meals)   hydrALAZINE 25 mg oral tablet: 1 tab(s) orally 2 times a day  Insulin Pen Needles, 4mm: 1 application subcutaneously 4 times a day. ** Use with insulin pen **   lancets: 1 application subcutaneously 4 times a day   Lantus Solostar Pen 100 units/mL subcutaneous solution: 25 unit(s) subcutaneous once a day   LATANOPROST 0.005% EYE DROPS: 1 milliliter(s) to each affected eye once a day   Metoprolol Succinate ER 25 mg oral tablet, extended release: 1 tab(s) orally once a day   pantoprazole 40 mg oral delayed release tablet: 1 tab(s) orally once a day (before a meal)  tamsulosin 0.4 mg oral capsule: 1 cap(s) orally once a day (at bedtime)  test strips (per patient&#x27;s insurance): 1 application subcutaneously 4 times a day. ** Compatible with patient&#x27;s glucometer **   amLODIPine 10 mg oral tablet: 1 tab(s) orally once a day  aspirin 81 mg oral delayed release tablet: 1 tab(s) orally once a day  atorvastatin 80 mg oral tablet: 1 tab(s) orally once a day (at bedtime)  clopidogrel 75 mg oral tablet: 1 tab(s) orally once a day  finasteride 5 mg oral tablet: 1 tab(s) orally once a day  glucometer (per patient&#x27;s insurance): Test blood sugars four times a day. Dispense #1 glucometer.  HumaLOG KwikPen 100 units/mL injectable solution: 3 unit(s) subcutaneous 3 times a day (with meals)   Insulin Pen Needles, 4mm: 1 application subcutaneously 4 times a day. ** Use with insulin pen **   lancets: 1 application subcutaneously 4 times a day   Lantus Solostar Pen 100 units/mL subcutaneous solution: 25 unit(s) subcutaneous once a day   LATANOPROST 0.005% EYE DROPS: 1 milliliter(s) to each affected eye once a day   Metoprolol Succinate ER 25 mg oral tablet, extended release: 1 tab(s) orally once a day   pantoprazole 40 mg oral delayed release tablet: 1 tab(s) orally once a day (before a meal)  tamsulosin 0.4 mg oral capsule: 1 cap(s) orally once a day (at bedtime)  test strips (per patient&#x27;s insurance): 1 application subcutaneously 4 times a day. ** Compatible with patient&#x27;s glucometer **

## 2022-02-28 NOTE — PROGRESS NOTE ADULT - SUBJECTIVE AND OBJECTIVE BOX
HPI:           77 y/o male with PMHx DM type 2, HTN, s/p R nephrectomy, Dementia (noted cognitive impairment for the last few months to a yr per pt) presents to the ED due to weakness. Pt presently AAOX3 reports after using the rest room he suddenly "lost all energy" and gentle went/sat down to the floor (he did not fall). Pt reports he also became diaphoretic, states he was able to pick himself up by grabbing on the things/stair railings till made it to bed. Pt unsure however thinks he may have lost consciousness. Family reported patient has very forgetful, worsened over past month. Lives with wife and son, manages own appointments and medications. Son reports that patient's blood sugar is usually 200-400. Today, patient was home alone all day. Around 12noon patient sounded normal, didn't answer phone at 1 PM. Patient's son came home at 6Pm, patient was lying in bed with legs hanging down, slurred speech, unable to stand. Pt reports he may mixed up his medications. Pt denies cp, SOB, dizziness or palpitations, HA or unilateral weakness (25 Feb 2022 23:12)      Patient seen and examined this am. No new events    MEDICATIONS:    amLODIPine   Tablet 10 milliGRAM(s) Oral daily  aspirin enteric coated 81 milliGRAM(s) Oral daily  ATENolol  Tablet 25 milliGRAM(s) Oral daily  atorvastatin 80 milliGRAM(s) Oral at bedtime  clopidogrel Tablet 75 milliGRAM(s) Oral daily  dextrose 40% Gel 15 Gram(s) Oral once  dextrose 5%. 1000 milliLiter(s) IV Continuous <Continuous>  dextrose 5%. 1000 milliLiter(s) IV Continuous <Continuous>  dextrose 50% Injectable 25 Gram(s) IV Push once  dextrose 50% Injectable 12.5 Gram(s) IV Push once  dextrose 50% Injectable 25 Gram(s) IV Push once  finasteride 5 milliGRAM(s) Oral daily  glucagon  Injectable 1 milliGRAM(s) IntraMuscular once  heparin   Injectable 5000 Unit(s) SubCutaneous every 8 hours  hydrALAZINE 25 milliGRAM(s) Oral two times a day  insulin glargine Injectable (LANTUS) 10 Unit(s) SubCutaneous every morning  insulin lispro (ADMELOG) corrective regimen sliding scale   SubCutaneous three times a day before meals  insulin lispro (ADMELOG) corrective regimen sliding scale   SubCutaneous at bedtime  insulin lispro Injectable (ADMELOG) 3 Unit(s) SubCutaneous three times a day before meals  loratadine 10 milliGRAM(s) Oral daily  pantoprazole    Tablet 40 milliGRAM(s) Oral before breakfast  tamsulosin 0.4 milliGRAM(s) Oral at bedtime      LABS:                          14.0   7.23  )-----------( 130      ( 28 Feb 2022 06:43 )             41.9     02-28    136  |  108  |  31<H>  ----------------------------<  166<H>  4.0   |  23  |  1.83<H>    Ca    9.4      28 Feb 2022 06:43      CAPILLARY BLOOD GLUCOSE      POCT Blood Glucose.: 155 mg/dL (28 Feb 2022 08:59)  POCT Blood Glucose.: 137 mg/dL (28 Feb 2022 07:29)  POCT Blood Glucose.: 301 mg/dL (27 Feb 2022 21:15)  POCT Blood Glucose.: 308 mg/dL (27 Feb 2022 16:16)  POCT Blood Glucose.: 339 mg/dL (27 Feb 2022 11:25)        I&O's Summary    27 Feb 2022 07:01  -  28 Feb 2022 07:00  --------------------------------------------------------  IN: 920 mL / OUT: 0 mL / NET: 920 mL      Vital Signs Last 24 Hrs  T(C): 36.6 (28 Feb 2022 05:07), Max: 36.6 (28 Feb 2022 00:09)  T(F): 97.8 (28 Feb 2022 05:07), Max: 97.9 (28 Feb 2022 00:09)  HR: 62 (28 Feb 2022 05:07) (60 - 68)  BP: 172/91 (28 Feb 2022 05:07) (161/80 - 172/91)  BP(mean): --  RR: 18 (28 Feb 2022 05:07) (17 - 18)  SpO2: 98% (28 Feb 2022 05:07) (96% - 99%)      On Neurological Examination:    Mental Status - Patient is alert, awake, oriented X3. fluent, names, no dysarthria no aphasia Follows commands well and able to answer questions appropriately. Mood and affect  normal    Cranial Nerves - PERRL, EOMI, VFF, V1-V3 intact mild right facial, tongue/uvula midline    Motor Exam -   no drift    Sensory    Intact to light touch and pinprick bilaterally    Coord: FTN intact bilaterally     Gait -  normal              RADIOLOGY  < from: MR Head No Cont (02.26.22 @ 12:55) >  IMPRESSION: Acute small anterior right pontine infarction.    < end of copied text >    < from: MR Angio Neck No Cont (02.27.22 @ 13:10) >  MRA BRAIN: Mild to moderate proximal basilar artery stenosis.    MRA NECK: No significant stenosis    --- End of Report ---    < end of copied text >    < from: TTE Echo Complete w/o Contrast w/ Doppler (02.26.22 @ 13:02) >    Summary:   1. Left ventricular ejection fraction, by visual estimation, is 65 to   70%.   2. Normal global left ventricular systolic function.   3. Spectral Doppler shows impaired relaxation pattern of left   ventricular myocardial filling (Grade I diastolic dysfunction).   4. Mild basal septal hypertrophy without clear evidence of LVOT   obstruction.   5. The left atrium is normal in size.   6. Thickening of the anterior and posterior mitral valve leaflets.   7. Trace mitral valve regurgitation.   8. Moderate aortic regurgitation.   9. Sclerotic aortic valve with normal opening.    < end of copied text >  A1C with Estimated Average Glucose Result: 10.2: Method: Immunoassay

## 2022-02-28 NOTE — DISCHARGE NOTE PROVIDER - CARE PROVIDER_API CALL
Vikram Thakkar)  Neurology  [address] [facility] [address]  [place] [address]  Phone: [phone]  Fax: [phone]  Follow Up Time: 1 week

## 2022-03-01 VITALS
HEART RATE: 62 BPM | SYSTOLIC BLOOD PRESSURE: 123 MMHG | TEMPERATURE: 98 F | RESPIRATION RATE: 18 BRPM | DIASTOLIC BLOOD PRESSURE: 74 MMHG | OXYGEN SATURATION: 98 %

## 2022-03-01 LAB
-  AMIKACIN: SIGNIFICANT CHANGE UP
-  AMOXICILLIN/CLAVULANIC ACID: SIGNIFICANT CHANGE UP
-  AMPICILLIN/SULBACTAM: SIGNIFICANT CHANGE UP
-  AMPICILLIN: SIGNIFICANT CHANGE UP
-  AZTREONAM: SIGNIFICANT CHANGE UP
-  CEFAZOLIN: SIGNIFICANT CHANGE UP
-  CEFEPIME: SIGNIFICANT CHANGE UP
-  CEFOXITIN: SIGNIFICANT CHANGE UP
-  CEFTRIAXONE: SIGNIFICANT CHANGE UP
-  CIPROFLOXACIN: SIGNIFICANT CHANGE UP
-  ERTAPENEM: SIGNIFICANT CHANGE UP
-  GENTAMICIN: SIGNIFICANT CHANGE UP
-  IMIPENEM: SIGNIFICANT CHANGE UP
-  LEVOFLOXACIN: SIGNIFICANT CHANGE UP
-  MEROPENEM: SIGNIFICANT CHANGE UP
-  NITROFURANTOIN: SIGNIFICANT CHANGE UP
-  PIPERACILLIN/TAZOBACTAM: SIGNIFICANT CHANGE UP
-  TIGECYCLINE: SIGNIFICANT CHANGE UP
-  TOBRAMYCIN: SIGNIFICANT CHANGE UP
-  TRIMETHOPRIM/SULFAMETHOXAZOLE: SIGNIFICANT CHANGE UP
CULTURE RESULTS: SIGNIFICANT CHANGE UP
GLUCOSE BLDC GLUCOMTR-MCNC: 156 MG/DL — HIGH (ref 70–99)
GLUCOSE BLDC GLUCOMTR-MCNC: 224 MG/DL — HIGH (ref 70–99)
GLUCOSE BLDC GLUCOMTR-MCNC: 372 MG/DL — HIGH (ref 70–99)
METHOD TYPE: SIGNIFICANT CHANGE UP
ORGANISM # SPEC MICROSCOPIC CNT: SIGNIFICANT CHANGE UP
ORGANISM # SPEC MICROSCOPIC CNT: SIGNIFICANT CHANGE UP
SPECIMEN SOURCE: SIGNIFICANT CHANGE UP

## 2022-03-01 PROCEDURE — 99239 HOSP IP/OBS DSCHRG MGMT >30: CPT

## 2022-03-01 RX ORDER — AMLODIPINE BESYLATE 2.5 MG/1
1 TABLET ORAL
Qty: 30 | Refills: 0
Start: 2022-03-01 | End: 2022-03-30

## 2022-03-01 RX ADMIN — HEPARIN SODIUM 5000 UNIT(S): 5000 INJECTION INTRAVENOUS; SUBCUTANEOUS at 06:00

## 2022-03-01 RX ADMIN — Medication 81 MILLIGRAM(S): at 11:53

## 2022-03-01 RX ADMIN — LORATADINE 10 MILLIGRAM(S): 10 TABLET ORAL at 11:54

## 2022-03-01 RX ADMIN — AMLODIPINE BESYLATE 10 MILLIGRAM(S): 2.5 TABLET ORAL at 06:00

## 2022-03-01 RX ADMIN — ATENOLOL 25 MILLIGRAM(S): 25 TABLET ORAL at 06:00

## 2022-03-01 RX ADMIN — HEPARIN SODIUM 5000 UNIT(S): 5000 INJECTION INTRAVENOUS; SUBCUTANEOUS at 14:23

## 2022-03-01 RX ADMIN — FINASTERIDE 5 MILLIGRAM(S): 5 TABLET, FILM COATED ORAL at 11:53

## 2022-03-01 RX ADMIN — Medication 25 MILLIGRAM(S): at 06:00

## 2022-03-01 RX ADMIN — Medication 3 UNIT(S): at 11:53

## 2022-03-01 RX ADMIN — CLOPIDOGREL BISULFATE 75 MILLIGRAM(S): 75 TABLET, FILM COATED ORAL at 11:53

## 2022-03-01 RX ADMIN — PANTOPRAZOLE SODIUM 40 MILLIGRAM(S): 20 TABLET, DELAYED RELEASE ORAL at 06:00

## 2022-03-01 RX ADMIN — Medication 2: at 08:17

## 2022-03-01 RX ADMIN — Medication 3 UNIT(S): at 08:17

## 2022-03-01 RX ADMIN — INSULIN GLARGINE 10 UNIT(S): 100 INJECTION, SOLUTION SUBCUTANEOUS at 09:06

## 2022-03-01 RX ADMIN — Medication 6: at 11:53

## 2022-03-01 NOTE — PROGRESS NOTE ADULT - SUBJECTIVE AND OBJECTIVE BOX
HPI:           77 y/o male with PMHx DM type 2, HTN, s/p R nephrectomy, Dementia (noted cognitive impairment for the last few months to a yr per pt) presents to the ED due to weakness. Pt presently AAOX3 reports after using the rest room he suddenly "lost all energy" and gentle went/sat down to the floor (he did not fall). Pt reports he also became diaphoretic, states he was able to pick himself up by grabbing on the things/stair railings till made it to bed. Pt unsure however thinks he may have lost consciousness. Family reported patient has very forgetful, worsened over past month. Lives with wife and son, manages own appointments and medications. Son reports that patient's blood sugar is usually 200-400. Today, patient was home alone all day. Around 12noon patient sounded normal, didn't answer phone at 1 PM. Patient's son came home at 6Pm, patient was lying in bed with legs hanging down, slurred speech, unable to stand. Pt reports he may mixed up his medications. Pt denies cp, SOB, dizziness or palpitations, HA or unilateral weakness (25 Feb 2022 23:12)      Patient seen and examined this am. No new events  MEDICATIONS:    amLODIPine   Tablet 10 milliGRAM(s) Oral daily  aspirin enteric coated 81 milliGRAM(s) Oral daily  atorvastatin 80 milliGRAM(s) Oral at bedtime  clopidogrel Tablet 75 milliGRAM(s) Oral daily  dextrose 40% Gel 15 Gram(s) Oral once  dextrose 5%. 1000 milliLiter(s) IV Continuous <Continuous>  dextrose 5%. 1000 milliLiter(s) IV Continuous <Continuous>  dextrose 50% Injectable 25 Gram(s) IV Push once  dextrose 50% Injectable 12.5 Gram(s) IV Push once  dextrose 50% Injectable 25 Gram(s) IV Push once  finasteride 5 milliGRAM(s) Oral daily  glucagon  Injectable 1 milliGRAM(s) IntraMuscular once  heparin   Injectable 5000 Unit(s) SubCutaneous every 8 hours  insulin glargine Injectable (LANTUS) 10 Unit(s) SubCutaneous every morning  insulin lispro (ADMELOG) corrective regimen sliding scale   SubCutaneous three times a day before meals  insulin lispro (ADMELOG) corrective regimen sliding scale   SubCutaneous at bedtime  insulin lispro Injectable (ADMELOG) 3 Unit(s) SubCutaneous three times a day before meals  loratadine 10 milliGRAM(s) Oral daily  pantoprazole    Tablet 40 milliGRAM(s) Oral before breakfast  tamsulosin 0.4 milliGRAM(s) Oral at bedtime      LABS:                          14.0   7.23  )-----------( 130      ( 28 Feb 2022 06:43 )             41.9     02-28    136  |  108  |  31<H>  ----------------------------<  166<H>  4.0   |  23  |  1.83<H>    Ca    9.4      28 Feb 2022 06:43      CAPILLARY BLOOD GLUCOSE      POCT Blood Glucose.: 224 mg/dL (01 Mar 2022 09:01)  POCT Blood Glucose.: 156 mg/dL (01 Mar 2022 07:20)  POCT Blood Glucose.: 217 mg/dL (28 Feb 2022 21:03)  POCT Blood Glucose.: 206 mg/dL (28 Feb 2022 18:36)  POCT Blood Glucose.: 132 mg/dL (28 Feb 2022 16:53)  POCT Blood Glucose.: 255 mg/dL (28 Feb 2022 11:02)        I&O's Summary    Vital Signs Last 24 Hrs  T(C): 36.4 (01 Mar 2022 05:22), Max: 36.8 (28 Feb 2022 10:36)  T(F): 97.5 (01 Mar 2022 05:22), Max: 98.2 (28 Feb 2022 10:36)  HR: 58 (01 Mar 2022 07:45) (58 - 65)  BP: 101/80 (01 Mar 2022 07:50) (88/70 - 172/81)  BP(mean): --  RR: 18 (01 Mar 2022 05:22) (17 - 18)  SpO2: 98% (01 Mar 2022 05:22) (96% - 98%)    On Neurological Examination:    Mental Status - Patient is alert, awake, oriented X3. fluent, names, no dysarthria no aphasia Follows commands well and able to answer questions appropriately. Mood and affect  normal    Cranial Nerves - PERRL, EOMI, VFF, V1-V3 intact mild right facial, tongue/uvula midline    Motor Exam -   no drift    Sensory    Intact to light touch and pinprick bilaterally    Coord: FTN intact bilaterally     Gait -  normal              RADIOLOGY  < from: MR Head No Cont (02.26.22 @ 12:55) >  IMPRESSION: Acute small anterior right pontine infarction.    < end of copied text >    < from: MR Angio Neck No Cont (02.27.22 @ 13:10) >  MRA BRAIN: Mild to moderate proximal basilar artery stenosis.    MRA NECK: No significant stenosis    --- End of Report ---    < end of copied text >    < from: TTE Echo Complete w/o Contrast w/ Doppler (02.26.22 @ 13:02) >    Summary:   1. Left ventricular ejection fraction, by visual estimation, is 65 to   70%.   2. Normal global left ventricular systolic function.   3. Spectral Doppler shows impaired relaxation pattern of left   ventricular myocardial filling (Grade I diastolic dysfunction).   4. Mild basal septal hypertrophy without clear evidence of LVOT   obstruction.   5. The left atrium is normal in size.   6. Thickening of the anterior and posterior mitral valve leaflets.   7. Trace mitral valve regurgitation.   8. Moderate aortic regurgitation.   9. Sclerotic aortic valve with normal opening.    < end of copied text >  A1C with Estimated Average Glucose Result: 10.2: Method: Immunoassay

## 2022-03-01 NOTE — PROGRESS NOTE ADULT - SUBJECTIVE AND OBJECTIVE BOX
Patient is a 76y old  Male who presents with a chief complaint of Possible Syncope, Hypoglycemia (2022 11:40)    INTERVAL HPI/OVERNIGHT EVENTS: Patients seen and examined at bedside this morning. No acute events overnight. Pt reports    MEDICATIONS  (STANDING):  amLODIPine   Tablet 10 milliGRAM(s) Oral daily  aspirin enteric coated 81 milliGRAM(s) Oral daily  atorvastatin 80 milliGRAM(s) Oral at bedtime  clopidogrel Tablet 75 milliGRAM(s) Oral daily  dextrose 40% Gel 15 Gram(s) Oral once  dextrose 5%. 1000 milliLiter(s) (50 mL/Hr) IV Continuous <Continuous>  dextrose 5%. 1000 milliLiter(s) (100 mL/Hr) IV Continuous <Continuous>  dextrose 50% Injectable 25 Gram(s) IV Push once  dextrose 50% Injectable 12.5 Gram(s) IV Push once  dextrose 50% Injectable 25 Gram(s) IV Push once  finasteride 5 milliGRAM(s) Oral daily  glucagon  Injectable 1 milliGRAM(s) IntraMuscular once  heparin   Injectable 5000 Unit(s) SubCutaneous every 8 hours  insulin glargine Injectable (LANTUS) 10 Unit(s) SubCutaneous every morning  insulin lispro (ADMELOG) corrective regimen sliding scale   SubCutaneous three times a day before meals  insulin lispro (ADMELOG) corrective regimen sliding scale   SubCutaneous at bedtime  insulin lispro Injectable (ADMELOG) 3 Unit(s) SubCutaneous three times a day before meals  loratadine 10 milliGRAM(s) Oral daily  pantoprazole    Tablet 40 milliGRAM(s) Oral before breakfast  tamsulosin 0.4 milliGRAM(s) Oral at bedtime    MEDICATIONS  (PRN):    Allergies    No Known Allergies    Intolerances      REVIEW OF SYSTEMS:  All other systems reviewed and are negative    Vital Signs Last 24 Hrs  T(C): 36.4 (01 Mar 2022 05:22), Max: 36.8 (2022 10:36)  T(F): 97.5 (01 Mar 2022 05:22), Max: 98.2 (2022 10:36)  HR: 58 (01 Mar 2022 07:45) (58 - 65)  BP: 101/80 (01 Mar 2022 07:50) (88/70 - 172/81)  BP(mean): --  RR: 18 (01 Mar 2022 05:22) (17 - 18)  SpO2: 98% (01 Mar 2022 05:22) (96% - 98%)  Daily     Daily Weight in k.5 (01 Mar 2022 05:22)  I&O's Summary    CAPILLARY BLOOD GLUCOSE      POCT Blood Glucose.: 224 mg/dL (01 Mar 2022 09:01)  POCT Blood Glucose.: 156 mg/dL (01 Mar 2022 07:20)  POCT Blood Glucose.: 217 mg/dL (2022 21:03)  POCT Blood Glucose.: 206 mg/dL (2022 18:36)  POCT Blood Glucose.: 132 mg/dL (2022 16:53)  POCT Blood Glucose.: 255 mg/dL (2022 11:02)    PHYSICAL EXAM:  GENERAL: NAD, well-groomed, well-developed  HEAD:  Atraumatic, Normocephalic  EYES: EOMI, PERRLA, conjunctiva and sclera clear  ENMT: No tonsillar erythema, exudates, or enlargement; Moist mucous membranes, Good dentition, No lesions  NECK: Supple, No JVD, Normal thyroid  NERVOUS SYSTEM:  Alert & Oriented X3, Good concentration; Motor Strength 5/5 B/L upper and lower extremities; DTRs 2+ intact and symmetric  CHEST/LUNG: Clear to percussion bilaterally; No rales, rhonchi, wheezing, or rubs  HEART: Regular rate and rhythm; No murmurs, rubs, or gallops  ABDOMEN: Soft, Nontender, Nondistended; Bowel sounds present  EXTREMITIES:  2+ Peripheral Pulses, No clubbing, cyanosis, or edema  LYMPH: No lymphadenopathy noted  SKIN: No rashes or lesions    Labs                          14.0   7.23  )-----------( 130      ( 2022 06:43 )             41.9         136  |  108  |  31<H>  ----------------------------<  166<H>  4.0   |  23  |  1.83<H>    Ca    9.4      2022 06:43                Culture - Urine (collected 2022 12:15)  Source: Clean Catch Clean Catch (Midstream)  Preliminary Report (2022 08:23):    10,000 - 49,000 CFU/mL Escherichia coli    <10,000 CFU/ml Normal Urogenital stephanie present    Culture - Blood (collected 2022 12:14)  Source: .Blood Blood-Peripheral  Preliminary Report (2022 13:01):    No growth to date.    Culture - Blood (collected 2022 12:14)  Source: .Blood Blood-Peripheral  Preliminary Report (2022 13:01):    No growth to date.                 Patient is a 76y old  Male who presents with a chief complaint of Possible Syncope, Hypoglycemia (2022 11:40)    INTERVAL HPI/OVERNIGHT EVENTS: Patients seen and examined at bedside this morning. No acute events overnight. Pt with hypotension this AM after BP meds given. Now stable in bed. Tolerating diet. Denies headache, dizziness, nausea, vomiting, or blurry vision.     MEDICATIONS  (STANDING):  amLODIPine   Tablet 10 milliGRAM(s) Oral daily  aspirin enteric coated 81 milliGRAM(s) Oral daily  atorvastatin 80 milliGRAM(s) Oral at bedtime  clopidogrel Tablet 75 milliGRAM(s) Oral daily  dextrose 40% Gel 15 Gram(s) Oral once  dextrose 5%. 1000 milliLiter(s) (50 mL/Hr) IV Continuous <Continuous>  dextrose 5%. 1000 milliLiter(s) (100 mL/Hr) IV Continuous <Continuous>  dextrose 50% Injectable 25 Gram(s) IV Push once  dextrose 50% Injectable 12.5 Gram(s) IV Push once  dextrose 50% Injectable 25 Gram(s) IV Push once  finasteride 5 milliGRAM(s) Oral daily  glucagon  Injectable 1 milliGRAM(s) IntraMuscular once  heparin   Injectable 5000 Unit(s) SubCutaneous every 8 hours  insulin glargine Injectable (LANTUS) 10 Unit(s) SubCutaneous every morning  insulin lispro (ADMELOG) corrective regimen sliding scale   SubCutaneous three times a day before meals  insulin lispro (ADMELOG) corrective regimen sliding scale   SubCutaneous at bedtime  insulin lispro Injectable (ADMELOG) 3 Unit(s) SubCutaneous three times a day before meals  loratadine 10 milliGRAM(s) Oral daily  pantoprazole    Tablet 40 milliGRAM(s) Oral before breakfast  tamsulosin 0.4 milliGRAM(s) Oral at bedtime    MEDICATIONS  (PRN):    Allergies    No Known Allergies    Intolerances      REVIEW OF SYSTEMS:  All other systems reviewed and are negative    Vital Signs Last 24 Hrs  T(C): 36.4 (01 Mar 2022 05:22), Max: 36.8 (2022 10:36)  T(F): 97.5 (01 Mar 2022 05:22), Max: 98.2 (2022 10:36)  HR: 58 (01 Mar 2022 07:45) (58 - 65)  BP: 101/80 (01 Mar 2022 07:50) (88/70 - 172/81)  BP(mean): --  RR: 18 (01 Mar 2022 05:22) (17 - 18)  SpO2: 98% (01 Mar 2022 05:22) (96% - 98%)  Daily     Daily Weight in k.5 (01 Mar 2022 05:22)  I&O's Summary    CAPILLARY BLOOD GLUCOSE      POCT Blood Glucose.: 224 mg/dL (01 Mar 2022 09:01)  POCT Blood Glucose.: 156 mg/dL (01 Mar 2022 07:20)  POCT Blood Glucose.: 217 mg/dL (2022 21:03)  POCT Blood Glucose.: 206 mg/dL (2022 18:36)  POCT Blood Glucose.: 132 mg/dL (2022 16:53)  POCT Blood Glucose.: 255 mg/dL (2022 11:02)    PHYSICAL EXAM:  GENERAL: NAD, well-groomed, well-developed  HEAD:  Atraumatic, Normocephalic  EYES: EOMI, PERRLA, conjunctiva and sclera clear  ENMT: No tonsillar erythema, exudates, or enlargement; Moist mucous membranes, Good dentition, No lesions  NECK: Supple, No JVD, Normal thyroid  NERVOUS SYSTEM:  Alert & Oriented X3, Poor concentration; Motor Strength 5/5 B/L upper and lower extremities; DTRs 2+ intact and symmetric  CHEST/LUNG: Clear to percussion bilaterally; No rales, rhonchi, wheezing, or rubs  HEART: Regular rate and rhythm; No murmurs, rubs, or gallops  ABDOMEN: Soft, Nontender, Nondistended; Bowel sounds present  EXTREMITIES:  2+ Peripheral Pulses, No clubbing, cyanosis, or edema  LYMPH: No lymphadenopathy noted  SKIN: No rashes or lesions    Labs                          14.0   7.23  )-----------( 130      ( 2022 06:43 )             41.9         136  |  108  |  31<H>  ----------------------------<  166<H>  4.0   |  23  |  1.83<H>    Ca    9.4      2022 06:43                Culture - Urine (collected 2022 12:15)  Source: Clean Catch Clean Catch (Midstream)  Preliminary Report (2022 08:23):    10,000 - 49,000 CFU/mL Escherichia coli    <10,000 CFU/ml Normal Urogenital stephanie present    Culture - Blood (collected 2022 12:14)  Source: .Blood Blood-Peripheral  Preliminary Report (2022 13:01):    No growth to date.    Culture - Blood (collected 2022 12:14)  Source: .Blood Blood-Peripheral  Preliminary Report (2022 13:01):    No growth to date.

## 2022-03-01 NOTE — PROGRESS NOTE ADULT - ASSESSMENT
75 y/o male with PMHx DM type 2, HTN, s/p R nephrectomy, Dementia presents to the ED due to weakness. Exam currently non-focal other than a mild right facial NIHSS 1. MRI brain read as an acute right pontine infarct. 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   Plavix 75 mg x 3 weeks  Atorvastatin, titrate the dose according to LDL.   TTE reviewed   DVT prophylaxis, Neurochecks  HbA1C 10.2      -No further inpatient Neurologic workup at this time  Can follow up with Neurology, Dr. Vikram Thakkar at [phone]

## 2022-03-01 NOTE — PROGRESS NOTE ADULT - REASON FOR ADMISSION
Possible Syncope, Hypoglycemia

## 2022-03-01 NOTE — PROGRESS NOTE ADULT - ASSESSMENT
75 y/o male with PMHx DM type 2, HTN, s/p R nephrectomy, CKD stage 3, Dementia presents to the ED due to weakness admitted due to hypoglycemia with mismanagement of medications found to have a new acute small anterior right pontine infarction. Patient is stable medically for discharge with home care. Please follow up with eye doctor and neurologist upon discharge.     AMS 2/2 New acute small anterior right pontine infarction  Neurology consult  Atorvastatin 80 mg PO QD  Plavix 75 mg PO QD X3 months.   Monitor BP  MRA head/neck  ECHO  MRI reviewed  D/C hydralazine, atenolol  Start amlodipine 10 mg PO QD and metoprolol ER 25 mg PO QD     Hypoglycemia w/ DM II  - now resolved, place on FS qAC and HS w/ SSI  - given cognitive impairment would advise family to regulate medications more closely.    - A1c elevated    HTN  - will change atenolol to carvedilol   - < from: TTE Echo Complete w/o Contrast w/ Doppler (02.26.22 @ 13:02) >  Summary:   1. Left ventricular ejection fraction, by visual estimation, is 65 to   70%.   2. Normal global left ventricular systolic function.   3. Spectral Doppler shows impaired relaxation pattern of left   ventricular myocardial filling (Grade I diastolic dysfunction).   4. Mild basal septal hypertrophy without clear evidence of LVOT   obstruction.   5. The left atrium is normal in size.   6. Thickening of the anterior and posterior mitral valve leaflets.   7. Trace mitral valve regurgitation.   8. Moderate aortic regurgitation.   9. Sclerotic aortic valve with normal opening.    CKD stage 3  - renal fxn stable    Asymptomatic UTI  Ecoli in urine culture. pt asymptomatic. Will hold abx    4) DVT ppx - HSQ

## 2022-03-03 DIAGNOSIS — Z87.448 PERSONAL HISTORY OF OTHER DISEASES OF URINARY SYSTEM: ICD-10-CM

## 2022-03-03 DIAGNOSIS — Z78.9 OTHER SPECIFIED HEALTH STATUS: ICD-10-CM

## 2022-03-03 DIAGNOSIS — Z82.5 FAMILY HISTORY OF ASTHMA AND OTHER CHRONIC LOWER RESPIRATORY DISEASES: ICD-10-CM

## 2022-03-03 DIAGNOSIS — Z86.39 PERSONAL HISTORY OF OTHER ENDOCRINE, NUTRITIONAL AND METABOLIC DISEASE: ICD-10-CM

## 2022-03-03 DIAGNOSIS — Z82.49 FAMILY HISTORY OF ISCHEMIC HEART DISEASE AND OTHER DISEASES OF THE CIRCULATORY SYSTEM: ICD-10-CM

## 2022-03-03 LAB
CULTURE RESULTS: SIGNIFICANT CHANGE UP
CULTURE RESULTS: SIGNIFICANT CHANGE UP
SPECIMEN SOURCE: SIGNIFICANT CHANGE UP
SPECIMEN SOURCE: SIGNIFICANT CHANGE UP

## 2022-03-04 DIAGNOSIS — I12.9 HYPERTENSIVE CHRONIC KIDNEY DISEASE WITH STAGE 1 THROUGH STAGE 4 CHRONIC KIDNEY DISEASE, OR UNSPECIFIED CHRONIC KIDNEY DISEASE: ICD-10-CM

## 2022-03-04 DIAGNOSIS — Z90.5 ACQUIRED ABSENCE OF KIDNEY: ICD-10-CM

## 2022-03-04 DIAGNOSIS — F03.90 UNSPECIFIED DEMENTIA WITHOUT BEHAVIORAL DISTURBANCE: ICD-10-CM

## 2022-03-04 DIAGNOSIS — I95.9 HYPOTENSION, UNSPECIFIED: ICD-10-CM

## 2022-03-04 DIAGNOSIS — I63.9 CEREBRAL INFARCTION, UNSPECIFIED: ICD-10-CM

## 2022-03-04 DIAGNOSIS — E11.22 TYPE 2 DIABETES MELLITUS WITH DIABETIC CHRONIC KIDNEY DISEASE: ICD-10-CM

## 2022-03-04 DIAGNOSIS — E11.649 TYPE 2 DIABETES MELLITUS WITH HYPOGLYCEMIA WITHOUT COMA: ICD-10-CM

## 2022-03-04 DIAGNOSIS — Z79.4 LONG TERM (CURRENT) USE OF INSULIN: ICD-10-CM

## 2022-03-04 DIAGNOSIS — Z79.899 OTHER LONG TERM (CURRENT) DRUG THERAPY: ICD-10-CM

## 2022-03-04 DIAGNOSIS — E11.9 TYPE 2 DIABETES MELLITUS WITHOUT COMPLICATIONS: ICD-10-CM

## 2022-03-04 DIAGNOSIS — N18.30 CHRONIC KIDNEY DISEASE, STAGE 3 UNSPECIFIED: ICD-10-CM

## 2022-03-07 ENCOUNTER — APPOINTMENT (OUTPATIENT)
Dept: INTERNAL MEDICINE | Facility: CLINIC | Age: 77
End: 2022-03-07
Payer: MEDICARE

## 2022-03-07 VITALS
WEIGHT: 164 LBS | TEMPERATURE: 98.1 F | DIASTOLIC BLOOD PRESSURE: 70 MMHG | SYSTOLIC BLOOD PRESSURE: 132 MMHG | BODY MASS INDEX: 27.32 KG/M2 | HEART RATE: 82 BPM | OXYGEN SATURATION: 97 % | HEIGHT: 65 IN

## 2022-03-07 DIAGNOSIS — R07.81 PLEURODYNIA: ICD-10-CM

## 2022-03-07 DIAGNOSIS — Z12.83 ENCOUNTER FOR SCREENING FOR MALIGNANT NEOPLASM OF SKIN: ICD-10-CM

## 2022-03-07 PROCEDURE — 99496 TRANSJ CARE MGMT HIGH F2F 7D: CPT | Mod: 25

## 2022-03-07 PROCEDURE — 36415 COLL VENOUS BLD VENIPUNCTURE: CPT

## 2022-03-07 NOTE — PLAN
[FreeTextEntry1] : F/u exam after hospitalization is done. \par Will do a blood test today, f/u in 2 wks to f/u\par F/u with an endocrinologist\par F/u with podiatrist, ophtalmologist, ent\par For a pain on the R side of the ribs recommend  to take tylenol prn, referred to do an x-ray\par Monitor BP reading at home\par F/u with urologist\par Will try to obtained d/c summary from the hospital. MA was informed.\par Patient is verbalized understandimg\par

## 2022-03-07 NOTE — HEALTH RISK ASSESSMENT
[Intercurrent hospitalizations] : was admitted to the hospital  [Never] : Never [Yes] : Yes [One fall no injury in past year] : Patient reported one fall in the past year without injury [0] : 2) Feeling down, depressed, or hopeless: Not at all (0) [PHQ-2 Negative - No further assessment needed] : PHQ-2 Negative - No further assessment needed [Learning/Retaining New Information] : difficulty learning/retaining new information [Handling Complex Tasks] : difficulty handling complex tasks [With Family] : lives with family [# of Members in Household ___] :  household currently consist of [unfilled] member(s) [Employed] : employed [] :  [# Of Children ___] : has [unfilled] children [Reports changes in hearing] : Reports changes in hearing [Reports changes in vision] : Reports changes in vision [Smoke Detector] : smoke detector [Carbon Monoxide Detector] : carbon monoxide detector [Safety elements used in home] : safety elements used in home [Seat Belt] :  uses seat belt [de-identified] : socially [[id number]] : 0 [Change in mental status noted] : No change in mental status noted [Language] : denies difficulty with language [Reports normal functional visual acuity (ie: able to read med bottle)] : Reports poor functional visual acuity.  [Reports changes in dental health] : Reports no changes in dental health [Guns at Home] : no guns at home [Sunscreen] : does not use sunscreen [FreeTextEntry2] :

## 2022-03-07 NOTE — HISTORY OF PRESENT ILLNESS
[Post-hospitalization from ___ Hospital] : Post-hospitalization from [unfilled] Hospital [Admitted on: ___] : The patient was admitted on [unfilled] [Discharged on ___] : discharged on [unfilled] [FreeTextEntry2] : Mr. SHERWNI MINOR 76 year  male  with a PMH  HTN, uncontrolled DM2, CRI, h/o renal cell carcinoma, h/o constipation   present to the office to f/u after ER. As per patient in 02/25/22 was at home, was feeling weak, he felt down was lying on the floor for 20 min later was managed to go to a bed, his son came and call 911. Ambulance came and found that his BG was 50, got some injection to the arm after felt better. His weakness is improved.  Currently patient is c/o pain on the R side of the rib for the past 10 days. Pain aggravates on a deep inspiration, certain movement. Also c/o decrease vision, decrease hearing. Patient has an appoitment with neurologist on 04/14 Dr. Artie Tovar.\par

## 2022-03-10 ENCOUNTER — NON-APPOINTMENT (OUTPATIENT)
Age: 77
End: 2022-03-10

## 2022-03-10 LAB
25(OH)D3 SERPL-MCNC: 21.2 NG/ML
ALBUMIN SERPL ELPH-MCNC: 4.4 G/DL
ALP BLD-CCNC: 98 U/L
ALT SERPL-CCNC: 39 U/L
ANION GAP SERPL CALC-SCNC: 14 MMOL/L
AST SERPL-CCNC: 18 U/L
BASOPHILS # BLD AUTO: 0.05 K/UL
BASOPHILS NFR BLD AUTO: 0.7 %
BILIRUB SERPL-MCNC: 0.5 MG/DL
BUN SERPL-MCNC: 37 MG/DL
CALCIUM SERPL-MCNC: 10.6 MG/DL
CHLORIDE SERPL-SCNC: 103 MMOL/L
CHOLEST SERPL-MCNC: 203 MG/DL
CO2 SERPL-SCNC: 20 MMOL/L
CREAT SERPL-MCNC: 1.86 MG/DL
EGFR: 37 ML/MIN/1.73M2
EOSINOPHIL # BLD AUTO: 0.19 K/UL
EOSINOPHIL NFR BLD AUTO: 2.6 %
ESTIMATED AVERAGE GLUCOSE: 235 MG/DL
GLUCOSE SERPL-MCNC: 106 MG/DL
HBA1C MFR BLD HPLC: 9.8 %
HCT VFR BLD CALC: 46.2 %
HDLC SERPL-MCNC: 41 MG/DL
HGB BLD-MCNC: 14.9 G/DL
IMM GRANULOCYTES NFR BLD AUTO: 0.3 %
IRON SATN MFR SERPL: 25 %
IRON SERPL-MCNC: 84 UG/DL
LDLC SERPL CALC-MCNC: 143 MG/DL
LYMPHOCYTES # BLD AUTO: 1.78 K/UL
LYMPHOCYTES NFR BLD AUTO: 24.4 %
MAN DIFF?: NORMAL
MCHC RBC-ENTMCNC: 29.4 PG
MCHC RBC-ENTMCNC: 32.3 GM/DL
MCV RBC AUTO: 91.3 FL
MONOCYTES # BLD AUTO: 0.66 K/UL
MONOCYTES NFR BLD AUTO: 9 %
NEUTROPHILS # BLD AUTO: 4.61 K/UL
NEUTROPHILS NFR BLD AUTO: 63 %
NONHDLC SERPL-MCNC: 163 MG/DL
PLATELET # BLD AUTO: 172 K/UL
POTASSIUM SERPL-SCNC: 5.2 MMOL/L
PROT SERPL-MCNC: 7.2 G/DL
RBC # BLD: 5.06 M/UL
RBC # FLD: 13 %
SODIUM SERPL-SCNC: 137 MMOL/L
TIBC SERPL-MCNC: 337 UG/DL
TRIGL SERPL-MCNC: 100 MG/DL
TSH SERPL-ACNC: 1.81 UIU/ML
UIBC SERPL-MCNC: 253 UG/DL
WBC # FLD AUTO: 7.31 K/UL

## 2022-03-21 ENCOUNTER — APPOINTMENT (OUTPATIENT)
Dept: INTERNAL MEDICINE | Facility: CLINIC | Age: 77
End: 2022-03-21
Payer: MEDICARE

## 2022-03-21 ENCOUNTER — LABORATORY RESULT (OUTPATIENT)
Age: 77
End: 2022-03-21

## 2022-03-21 VITALS
DIASTOLIC BLOOD PRESSURE: 82 MMHG | SYSTOLIC BLOOD PRESSURE: 144 MMHG | TEMPERATURE: 98 F | WEIGHT: 162 LBS | HEIGHT: 65 IN | OXYGEN SATURATION: 96 % | BODY MASS INDEX: 26.99 KG/M2 | HEART RATE: 106 BPM

## 2022-03-21 PROCEDURE — 99214 OFFICE O/P EST MOD 30 MIN: CPT

## 2022-03-21 NOTE — PLAN
[FreeTextEntry1] : Result of the blood test discussed with the patient in detail. \par Patient has an uncontrolled DM2, HbA1C is 9.8. Patient is non compliant with insulin injection. Inject 8 U of lantus, instead of 25 U, Humalog inject 3 U TID. Recommend  to increase by 1U qhs of lantus every night, goal of the morning Glucose is between . F/u with endo, has an appoitment next week\par Has CRI, Cr is 1.86(stable), recommend  to f/u with nephrologist\par Calcium is 10.6 is elevated, recommend  incerase hydration, repeat a blood test in 3 wks, f/u with endocrinologist\par Has a hyperlipidemia, continue lipitor 80mg qhs, be compliant with med, modify diet\par Vit D is low 21.2, recommend  to take vit D3 2000U daily, can not give him [id number] qw due to CRI\par For a cough SPO2 98% on a RA.   Recommend  to take medications as it is prescriped. If symptoms will persist RTC. Rx were issued.\par For frequency on a urination, do a u/a with urine culture today, f/u with urologist\par \par

## 2022-03-21 NOTE — HISTORY OF PRESENT ILLNESS
[Spouse] : spouse [de-identified] : Mr. SHERWIN MINOR 76 year male with a PMH HTN, uncontrolled DM2, CRI, h/o renal cell carcinoma, h/o constipation present to the office to f/u on a lab results. As per patient for the past 2 days has a  throat pain, cough, chest congestion. Denies earache. Also  c/o frequency on a urination, denies burning sensation

## 2022-03-22 LAB
APPEARANCE: CLEAR
BILIRUBIN URINE: NEGATIVE
BLOOD URINE: NEGATIVE
COLOR: NORMAL
GLUCOSE QUALITATIVE U: NEGATIVE
KETONES URINE: NEGATIVE
LEUKOCYTE ESTERASE URINE: NEGATIVE
NITRITE URINE: NEGATIVE
PH URINE: 6.5
PROTEIN URINE: ABNORMAL
SPECIFIC GRAVITY URINE: 1.01
UROBILINOGEN URINE: NORMAL

## 2022-04-06 ENCOUNTER — RX RENEWAL (OUTPATIENT)
Age: 77
End: 2022-04-06

## 2022-04-20 ENCOUNTER — APPOINTMENT (OUTPATIENT)
Dept: ENDOCRINOLOGY | Facility: CLINIC | Age: 77
End: 2022-04-20
Payer: MEDICARE

## 2022-04-20 VITALS
OXYGEN SATURATION: 98 % | WEIGHT: 164 LBS | HEART RATE: 91 BPM | BODY MASS INDEX: 27.32 KG/M2 | HEIGHT: 65 IN | DIASTOLIC BLOOD PRESSURE: 70 MMHG | SYSTOLIC BLOOD PRESSURE: 130 MMHG

## 2022-04-20 LAB — GLUCOSE BLDC GLUCOMTR-MCNC: 116

## 2022-04-20 PROCEDURE — 82962 GLUCOSE BLOOD TEST: CPT

## 2022-04-20 PROCEDURE — 36415 COLL VENOUS BLD VENIPUNCTURE: CPT

## 2022-04-20 PROCEDURE — 99214 OFFICE O/P EST MOD 30 MIN: CPT | Mod: 25

## 2022-04-20 PROCEDURE — 95251 CONT GLUC MNTR ANALYSIS I&R: CPT

## 2022-04-20 NOTE — ASSESSMENT
[Carbohydrate Consistent Diet] : carbohydrate consistent diet [Hypoglycemia Management] : hypoglycemia management [Diabetes Foot Care] : diabetes foot care [Long Term Vascular Complications] : long term vascular complications of diabetes [Action and use of Insulin] : action and use of short and long-acting insulin [Self Monitoring of Blood Glucose] : self monitoring of blood glucose [FreeTextEntry1] : 1. T2DM, severely  uncontrolled with multiple microvascular complications\par Extremely poor adherence\par - advised on meds compliance and diabetic complications\par - decr Lantus 7 units\par - unable to follow HISS. Will be taking Humalog 3 un ac meals\par - Trulicity 0.75 mg qw \par - Will consider using a small dose of SGLT2 inhibitors\par - Breanna  2\par - advised to bring the reports of his stress tests/ echo\par - podiatry evaluation\par \par 2. Hypercalcemia/ HPT\par - monitor ca/PTH\par - add vitamin D3 2000 IU/day\par - once D repleted, will collect 24h urine calcium\par RTC 3 mos and CDE evaluation in between\par

## 2022-04-20 NOTE — HISTORY OF PRESENT ILLNESS
[FreeTextEntry1] : F/u for diabetes management\par \par *** Apr 20, 2022 ***\par \par missed f/u visits again. admitted in 03/22 for uncontrolled sugar. Since discharge, more compliant with the diet, BS monitoring and taking his meds\par presently is on Lantus 8 to 9 units, not using Humalog scale but take son average 4 un ac meals\par stopped  Januvia \par last creat- 1.86\par \par Date of download:  4/20/22\par Diabetes Medications and Dosage: as above\par Indication for CGMS: verify a change in the treatment regimen, identify periods of hypoglycemia/ hyperglycemia. \par Modal day report: pattern. \par Pt with HYPO  2% of the time (NONE below 54),  72% in target range\par Hyperglycemia: 26 % elevation \par Identified issues: carbohydrate counting\par dates analyzed: 4/7/20-4/20/22\par \par \par *** Nov 10, 2021 ***\par \par missed f/u appts\par taking Lantus 14 units,  Humalog ac B and ac D - skips ac lunch, not using  Januvia \par labs from 8/21- a1c- 9.2, creat- 1.69, potassium- 6.9\par \par wearing Breanna\par Date of download:  11/10/21\par Diabetes Medications and Dosage: as above\par Indication for CGMS: verify a change in the treatment regimen, identify periods of hypoglycemia/ hyperglycemia. \par Modal day report: pattern. \par Pt with HYPO  0% of the time (NONE below 54),  36% in target range\par Hyperglycemia:  64% elevation \par Identified issues: carbohydrate counting\par dates analyzed: 10/28/21-11/10/21\par \par *** Oct 26, 2020 ***\par \par last visit in 11/19\par recovered from COVID\par on  Lantus 14 units but stopped taking Humalog\par labs from 9/20- a1c- 9.1, creat- 1.84, ca-9.7\par drives cab, irregular eating habits\par \par *** Nov 08, 2019 ***\par \par Patient returned for breanna interpretation and diabetes management\par see details below:\par \par Date sensor was placed: 10/18/19\par Date of download:  11/6/19\par Diabetes Medications and Dosage: taking lantus 15 to 18 un qd 4/wk, prandin 1mg ac cerain meals\par Indication for CGMS: verify a change in the treatment regimen, identify periods of hypoglycemia/ hyperglycemia. \par Modal day report: pattern. \par Hypoglycemia: patterned, Pt with HYPO 7 % of the time (2% below 54), 65 % in target range\par Hyperglycemia: 28 % elevation \par Identified issues: carbohydrate counting; hypo's after taking prandin and not eating\par dates analyzed: 10/18/19 - 10/30/19\par \par took 15 units of lantus this am, and prandin 1mg ac B- today in the office p/B- 232\par a1c- 8.0,  urine m/alb- 112, \par c-pept- 3.5 w/ insulin -7.9\par creat- 1.78, K- 5.4, ca- 10.6\par LDL- 88\par \par HISTORY OF PRESENT ILLNESS. \par \par Mr. MINOR was diagnosed with Diabetes Mellitus Type 2 in 1997\par Reports history HTN, dyslipidemia, renal hemorrhage (s/p right nephrectomy), PRDRP (s/p laser therapy)\par He denies CAD. He was previously on Lantus 10 units , Humalog and metformin, but stopped everything about 2-3 months ago\par Presently on repaglinide (not sure of the dose)\par Blood sugars are checked once a day. \par Did not bring log book, but reported are typically as following: FBS- 117-200, not checking other times \par Hypoglycemia frequency: twice a month\par Fingerstick glucose in the office today is 133 mg/dL  hours after eating. \par Diet: not following ADA\par Exercise: none\par \par \par ****\par Last dilated eye - 10/21\par Last podiatry visit  - 10/21\par Last cardiology evaluation - 2017\par Last stress test - 2017, normal per patient\par Last 2-D Echo - 2017, normal per patient\par Last nephrology evaluation - 6/19 \par Last neurology evaluation- none\par \par

## 2022-04-25 ENCOUNTER — RX RENEWAL (OUTPATIENT)
Age: 77
End: 2022-04-25

## 2022-06-07 ENCOUNTER — APPOINTMENT (OUTPATIENT)
Dept: INTERNAL MEDICINE | Facility: CLINIC | Age: 77
End: 2022-06-07

## 2022-06-08 ENCOUNTER — APPOINTMENT (OUTPATIENT)
Dept: INTERNAL MEDICINE | Facility: CLINIC | Age: 77
End: 2022-06-08
Payer: MEDICARE

## 2022-06-08 VITALS — DIASTOLIC BLOOD PRESSURE: 80 MMHG | SYSTOLIC BLOOD PRESSURE: 140 MMHG

## 2022-06-08 VITALS
HEART RATE: 63 BPM | TEMPERATURE: 98 F | SYSTOLIC BLOOD PRESSURE: 143 MMHG | HEIGHT: 65 IN | BODY MASS INDEX: 26.33 KG/M2 | WEIGHT: 158 LBS | OXYGEN SATURATION: 97 % | DIASTOLIC BLOOD PRESSURE: 80 MMHG

## 2022-06-08 DIAGNOSIS — H53.8 OTHER VISUAL DISTURBANCES: ICD-10-CM

## 2022-06-08 PROCEDURE — 99214 OFFICE O/P EST MOD 30 MIN: CPT

## 2022-06-08 NOTE — HISTORY OF PRESENT ILLNESS
[FreeTextEntry8] : Mr. SHERWIN MINOR 76 year male with a PMH HTN, uncontrolled DM2, CRI, has problem with memory,  h/o renal cell carcinoma, glaucoma, h/o TIA present to the office c/o blurry vision on the L eye for  the past 2 years. As per patient it is getting worsed. Was seen by an ophtalmologist 2-3 mo ago.  Has f/u appoitment at the end of this month. Denies  seeing any flush  lights inside the eyes. Also c/o smelly urine, has frequency on a urination. Denies dysuria. Also c/o runny nose on/off, sneezing, sometimes has a cough. Wants to get some medication for an allergy

## 2022-06-09 LAB
APPEARANCE: CLEAR
BILIRUBIN URINE: NEGATIVE
BLOOD URINE: NEGATIVE
COLOR: COLORLESS
GLUCOSE QUALITATIVE U: NEGATIVE
KETONES URINE: NEGATIVE
LEUKOCYTE ESTERASE URINE: NEGATIVE
NITRITE URINE: NEGATIVE
PH URINE: 6.5
PROTEIN URINE: NORMAL
SPECIFIC GRAVITY URINE: 1.01
UROBILINOGEN URINE: NORMAL

## 2022-06-14 ENCOUNTER — NON-APPOINTMENT (OUTPATIENT)
Age: 77
End: 2022-06-14

## 2022-06-21 ENCOUNTER — APPOINTMENT (OUTPATIENT)
Dept: ENDOCRINOLOGY | Facility: CLINIC | Age: 77
End: 2022-06-21
Payer: MEDICARE

## 2022-06-21 PROCEDURE — G0108 DIAB MANAGE TRN  PER INDIV: CPT

## 2022-07-21 NOTE — PATIENT PROFILE ADULT - HOME ACCESSIBILITY CONCERNS
no increased work of breathing or signs of respiratory distress, clear to auscultation bilaterally  none

## 2022-07-26 ENCOUNTER — RX RENEWAL (OUTPATIENT)
Age: 77
End: 2022-07-26

## 2022-07-26 ENCOUNTER — APPOINTMENT (OUTPATIENT)
Dept: ENDOCRINOLOGY | Facility: CLINIC | Age: 77
End: 2022-07-26

## 2022-08-10 ENCOUNTER — APPOINTMENT (OUTPATIENT)
Dept: ENDOCRINOLOGY | Facility: CLINIC | Age: 77
End: 2022-08-10

## 2022-08-10 VITALS
WEIGHT: 159 LBS | HEIGHT: 65 IN | HEART RATE: 94 BPM | RESPIRATION RATE: 17 BRPM | TEMPERATURE: 98 F | SYSTOLIC BLOOD PRESSURE: 130 MMHG | DIASTOLIC BLOOD PRESSURE: 62 MMHG | BODY MASS INDEX: 26.49 KG/M2 | OXYGEN SATURATION: 98 %

## 2022-08-10 LAB — GLUCOSE BLDC GLUCOMTR-MCNC: 274

## 2022-08-10 PROCEDURE — 99214 OFFICE O/P EST MOD 30 MIN: CPT | Mod: 25

## 2022-08-10 PROCEDURE — 95251 CONT GLUC MNTR ANALYSIS I&R: CPT

## 2022-08-10 PROCEDURE — 36415 COLL VENOUS BLD VENIPUNCTURE: CPT

## 2022-08-10 PROCEDURE — 82962 GLUCOSE BLOOD TEST: CPT

## 2022-08-10 NOTE — REASON FOR VISIT
“You can access the FollowHealth Patient Portal, offered by [facility], by registering with the following website: [email or web address]” [Follow - Up] : a follow-up visit [DM Type 2] : DM Type 2 [Family Member] : family member

## 2022-08-10 NOTE — HISTORY OF PRESENT ILLNESS
[FreeTextEntry1] : F/u for diabetes management\par \par *** Aug 10, 2022 ***\par \par feels ok\par taking Lantus 9 to 10 units HS, Humalog 3 to 4 un ac B only\par not taking trulicity yet\par wearing breanna\par Date of download:  08/10/2022 \par Diabetes Medications and Dosage: as above\par Indication for CGMS: verify a change in the treatment regimen, identify periods of hypoglycemia/ hyperglycemia. \par Modal day report: pattern. \par Pt with HYPO  0% of the time ( NONE below 54),  47% in target range\par Hyperglycemia:  53% elevation \par Identified issues: carbohydrate counting, spikes post lunch and dinner\par dates analyzed: 7/28/22 - 08/10/2022\par \par \par *** Apr 20, 2022 ***\par \par missed f/u visits again. admitted in 03/22 for uncontrolled sugar. Since discharge, more compliant with the diet, BS monitoring and taking his meds\par presently is on Lantus 8 to 9 units, not using Humalog scale but take son average 4 un ac meals\par stopped  Januvia \par last creat- 1.86\par \par Date of download:  4/20/22\par Diabetes Medications and Dosage: as above\par Indication for CGMS: verify a change in the treatment regimen, identify periods of hypoglycemia/ hyperglycemia. \par Modal day report: pattern. \par Pt with HYPO  2% of the time (NONE below 54),  72% in target range\par Hyperglycemia: 26 % elevation \par Identified issues: carbohydrate counting\par dates analyzed: 4/7/20-4/20/22\par \par \par *** Nov 10, 2021 ***\par \par missed f/u appts\par taking Lantus 14 units,  Humalog ac B and ac D - skips ac lunch, not using  Januvia \par labs from 8/21- a1c- 9.2, creat- 1.69, potassium- 6.9\par \par wearing Breanna\par Date of download:  11/10/21\par Diabetes Medications and Dosage: as above\par Indication for CGMS: verify a change in the treatment regimen, identify periods of hypoglycemia/ hyperglycemia. \par Modal day report: pattern. \par Pt with HYPO  0% of the time (NONE below 54),  36% in target range\par Hyperglycemia:  64% elevation \par Identified issues: carbohydrate counting\par dates analyzed: 10/28/21-11/10/21\par \par *** Oct 26, 2020 ***\par \par last visit in 11/19\par recovered from COVID\par on  Lantus 14 units but stopped taking Humalog\par labs from 9/20- a1c- 9.1, creat- 1.84, ca-9.7\par drives cab, irregular eating habits\par \par *** Nov 08, 2019 ***\par \par Patient returned for breanna interpretation and diabetes management\par see details below:\par \par Date sensor was placed: 10/18/19\par Date of download:  11/6/19\par Diabetes Medications and Dosage: taking lantus 15 to 18 un qd 4/wk, prandin 1mg ac cerain meals\par Indication for CGMS: verify a change in the treatment regimen, identify periods of hypoglycemia/ hyperglycemia. \par Modal day report: pattern. \par Hypoglycemia: patterned, Pt with HYPO 7 % of the time (2% below 54), 65 % in target range\par Hyperglycemia: 28 % elevation \par Identified issues: carbohydrate counting; hypo's after taking prandin and not eating\par dates analyzed: 10/18/19 - 10/30/19\par \par took 15 units of lantus this am, and prandin 1mg ac B- today in the office p/B- 232\par a1c- 8.0,  urine m/alb- 112, \par c-pept- 3.5 w/ insulin -7.9\par creat- 1.78, K- 5.4, ca- 10.6\par LDL- 88\par \par HISTORY OF PRESENT ILLNESS. \par \par Mr. MINOR was diagnosed with Diabetes Mellitus Type 2 in 1997\par Reports history HTN, dyslipidemia, renal hemorrhage (s/p right nephrectomy), PRDRP (s/p laser therapy)\par He denies CAD. He was previously on Lantus 10 units , Humalog and metformin, but stopped everything about 2-3 months ago\par Presently on repaglinide (not sure of the dose)\par Blood sugars are checked once a day. \par Did not bring log book, but reported are typically as following: FBS- 117-200, not checking other times \par Hypoglycemia frequency: twice a month\par Fingerstick glucose in the office today is 133 mg/dL  hours after eating. \par Diet: not following ADA\par Exercise: none\par \par \par ****\par Last dilated eye - 10/21\par Last podiatry visit  - 10/21\par Last cardiology evaluation - 2017\par Last stress test - 2017, normal per patient\par Last 2-D Echo - 2017, normal per patient\par Last nephrology evaluation - 6/19 \par Last neurology evaluation- none\par \par

## 2022-08-10 NOTE — ASSESSMENT
[Carbohydrate Consistent Diet] : carbohydrate consistent diet [Hypoglycemia Management] : hypoglycemia management [Diabetes Foot Care] : diabetes foot care [Long Term Vascular Complications] : long term vascular complications of diabetes [Action and use of Insulin] : action and use of short and long-acting insulin [Self Monitoring of Blood Glucose] : self monitoring of blood glucose [FreeTextEntry1] : 1. T2DM, severely  uncontrolled with multiple microvascular complications\par Extremely poor adherence\par - advised on meds compliance and diabetic complications\par - Lantus 10 units\par - unable to follow HISS. Will be taking Humalog 4 un ac meals\par - start Trulicity 0.75 mg qw \par - Will consider using a small dose of SGLT2 inhibitors\par - Breanna  2\par - advised to bring the reports of his stress tests/ echo\par - podiatry evaluation\par \par 2. Hypercalcemia/ HPT\par - monitor ca/PTH\par - add vitamin D3 2000 IU/day\par - once D repleted, will collect 24h urine calcium\par RTC 3 mos and CDE evaluation in between\par

## 2022-08-16 ENCOUNTER — TRANSCRIPTION ENCOUNTER (OUTPATIENT)
Age: 77
End: 2022-08-16

## 2022-08-16 LAB
25(OH)D3 SERPL-MCNC: 39.7 NG/ML
ALBUMIN SERPL ELPH-MCNC: 4.2 G/DL
ALP BLD-CCNC: 95 U/L
ALT SERPL-CCNC: 27 U/L
ANION GAP SERPL CALC-SCNC: 9 MMOL/L
AST SERPL-CCNC: 20 U/L
BILIRUB SERPL-MCNC: 0.2 MG/DL
BUN SERPL-MCNC: 25 MG/DL
CALCIUM SERPL-MCNC: 10.4 MG/DL
CALCIUM SERPL-MCNC: 10.4 MG/DL
CHLORIDE SERPL-SCNC: 102 MMOL/L
CHOLEST SERPL-MCNC: 149 MG/DL
CO2 SERPL-SCNC: 25 MMOL/L
CREAT SERPL-MCNC: 1.62 MG/DL
CREAT SPEC-SCNC: 76 MG/DL
EGFR: 44 ML/MIN/1.73M2
ESTIMATED AVERAGE GLUCOSE: 235 MG/DL
GLUCOSE SERPL-MCNC: 285 MG/DL
HBA1C MFR BLD HPLC: 9.8 %
HDLC SERPL-MCNC: 43 MG/DL
LDLC SERPL CALC-MCNC: 83 MG/DL
MICROALBUMIN 24H UR DL<=1MG/L-MCNC: 15.2 MG/DL
MICROALBUMIN/CREAT 24H UR-RTO: 199 MG/G
NONHDLC SERPL-MCNC: 106 MG/DL
PARATHYROID HORMONE INTACT: 166 PG/ML
POTASSIUM SERPL-SCNC: 5.6 MMOL/L
PROT SERPL-MCNC: 6.8 G/DL
SODIUM SERPL-SCNC: 135 MMOL/L
TRIGL SERPL-MCNC: 117 MG/DL
TSH SERPL-ACNC: 1.47 UIU/ML

## 2022-08-22 ENCOUNTER — RX RENEWAL (OUTPATIENT)
Age: 77
End: 2022-08-22

## 2022-08-30 ENCOUNTER — APPOINTMENT (OUTPATIENT)
Dept: ENDOCRINOLOGY | Facility: CLINIC | Age: 77
End: 2022-08-30

## 2022-08-30 PROCEDURE — P0004: CPT

## 2022-09-02 ENCOUNTER — RX RENEWAL (OUTPATIENT)
Age: 77
End: 2022-09-02

## 2022-09-07 ENCOUNTER — APPOINTMENT (OUTPATIENT)
Dept: ENDOCRINOLOGY | Facility: CLINIC | Age: 77
End: 2022-09-07

## 2022-09-07 ENCOUNTER — NON-APPOINTMENT (OUTPATIENT)
Age: 77
End: 2022-09-07

## 2022-09-07 PROCEDURE — G0108 DIAB MANAGE TRN  PER INDIV: CPT | Mod: GA

## 2022-09-14 ENCOUNTER — APPOINTMENT (OUTPATIENT)
Dept: ENDOCRINOLOGY | Facility: CLINIC | Age: 77
End: 2022-09-14

## 2022-09-19 ENCOUNTER — APPOINTMENT (OUTPATIENT)
Dept: INTERNAL MEDICINE | Facility: CLINIC | Age: 77
End: 2022-09-19

## 2022-09-19 VITALS
HEART RATE: 74 BPM | BODY MASS INDEX: 27.16 KG/M2 | WEIGHT: 163 LBS | HEIGHT: 65 IN | SYSTOLIC BLOOD PRESSURE: 146 MMHG | DIASTOLIC BLOOD PRESSURE: 88 MMHG | OXYGEN SATURATION: 97 %

## 2022-09-19 VITALS — DIASTOLIC BLOOD PRESSURE: 80 MMHG | SYSTOLIC BLOOD PRESSURE: 130 MMHG

## 2022-09-19 DIAGNOSIS — E87.5 HYPERKALEMIA: ICD-10-CM

## 2022-09-19 PROCEDURE — 36415 COLL VENOUS BLD VENIPUNCTURE: CPT

## 2022-09-19 PROCEDURE — 99214 OFFICE O/P EST MOD 30 MIN: CPT | Mod: 25

## 2022-09-19 NOTE — PLAN
[FreeTextEntry1] : Result of the blood test discussed with the patient in detail.\par  Patient has an uncontrolled DM2, recommend  strict low carb diet, be compliant with DM2 meds, f/u with endocrinologist\par Has an elevated creatinine level, potassium level, recommend  low potassium diet, repeat a blood test today, f/u with nephrologist\par TSH, vit D level is NL\par For a HTN  continur present meds\par Hyperlipidemia is well controlled, continue lipitor 80 mg qhs.\par Continue present meds\par Will call patient for a lab results\par RTC to f/u in 2 mo\par

## 2022-09-19 NOTE — HISTORY OF PRESENT ILLNESS
[Spouse] : spouse [FreeTextEntry1] : F/u exam [de-identified] : Mr. SHERWIN MINOR 76 year male with a PMH HTN, uncontrolled DM2, CRI, has problem with memory, h/o renal cell carcinoma, glaucoma, h/o TIA present to the office to a f/u on a lab results from 08/10/22.  Patient recently was seen by an endocrinologist started on an omni pod dash insulin pump 8/30/22(using humalog insulin pump), did not start trulicity.\par

## 2022-09-19 NOTE — REVIEW OF SYSTEMS
[Negative] : Neurological [Frequency] : frequency [Dysuria] : no dysuria [FreeTextEntry3] : has blurry vision on/off [FreeTextEntry5] : has mild edema of the feet

## 2022-09-21 ENCOUNTER — OFFICE (OUTPATIENT)
Dept: URBAN - METROPOLITAN AREA CLINIC 93 | Facility: CLINIC | Age: 77
Setting detail: OPHTHALMOLOGY
End: 2022-09-21
Payer: MEDICARE

## 2022-09-21 DIAGNOSIS — E13.3291: ICD-10-CM

## 2022-09-21 DIAGNOSIS — H33.312: ICD-10-CM

## 2022-09-21 DIAGNOSIS — H35.3131: ICD-10-CM

## 2022-09-21 DIAGNOSIS — E13.3312: ICD-10-CM

## 2022-09-21 DIAGNOSIS — H35.372: ICD-10-CM

## 2022-09-21 PROCEDURE — 92201 OPSCPY EXTND RTA DRAW UNI/BI: CPT | Performed by: OPHTHALMOLOGY

## 2022-09-21 PROCEDURE — 67028 INJECTION EYE DRUG: CPT | Performed by: OPHTHALMOLOGY

## 2022-09-21 PROCEDURE — 92134 CPTRZ OPH DX IMG PST SGM RTA: CPT | Performed by: OPHTHALMOLOGY

## 2022-09-21 PROCEDURE — 92004 COMPRE OPH EXAM NEW PT 1/>: CPT | Performed by: OPHTHALMOLOGY

## 2022-09-21 PROCEDURE — 92235 FLUORESCEIN ANGRPH MLTIFRAME: CPT | Performed by: OPHTHALMOLOGY

## 2022-09-21 ASSESSMENT — TONOMETRY
OS_IOP_MMHG: 19
OD_IOP_MMHG: 19

## 2022-09-21 ASSESSMENT — CONFRONTATIONAL VISUAL FIELD TEST (CVF)
OS_FINDINGS: FULL
OD_FINDINGS: FULL

## 2022-09-22 LAB
ALBUMIN SERPL ELPH-MCNC: 4.1 G/DL
ANION GAP SERPL CALC-SCNC: 9 MMOL/L
APPEARANCE: CLEAR
BILIRUBIN URINE: NEGATIVE
BLOOD URINE: NEGATIVE
BUN SERPL-MCNC: 32 MG/DL
CALCIUM SERPL-MCNC: 10.6 MG/DL
CHLORIDE SERPL-SCNC: 103 MMOL/L
CO2 SERPL-SCNC: 25 MMOL/L
COLOR: NORMAL
CREAT SERPL-MCNC: 1.76 MG/DL
EGFR: 40 ML/MIN/1.73M2
GLUCOSE QUALITATIVE U: NEGATIVE
GLUCOSE SERPL-MCNC: 72 MG/DL
KETONES URINE: NEGATIVE
LEUKOCYTE ESTERASE URINE: NEGATIVE
NITRITE URINE: NEGATIVE
PH URINE: 6.5
PHOSPHATE SERPL-MCNC: 2.9 MG/DL
POTASSIUM SERPL-SCNC: 4.4 MMOL/L
PROTEIN URINE: NORMAL
SODIUM SERPL-SCNC: 137 MMOL/L
SPECIFIC GRAVITY URINE: 1.01
UROBILINOGEN URINE: NORMAL

## 2022-09-23 ASSESSMENT — VISUAL ACUITY
OD_BCVA: 20/60
OS_BCVA: 20/30-2

## 2022-10-03 ENCOUNTER — EMERGENCY (EMERGENCY)
Facility: HOSPITAL | Age: 77
LOS: 0 days | Discharge: HOME | End: 2022-10-04
Attending: STUDENT IN AN ORGANIZED HEALTH CARE EDUCATION/TRAINING PROGRAM

## 2022-10-03 VITALS
TEMPERATURE: 100 F | SYSTOLIC BLOOD PRESSURE: 152 MMHG | HEIGHT: 65 IN | DIASTOLIC BLOOD PRESSURE: 97 MMHG | WEIGHT: 160.06 LBS | HEART RATE: 101 BPM | OXYGEN SATURATION: 95 % | RESPIRATION RATE: 16 BRPM

## 2022-10-03 DIAGNOSIS — Z79.82 LONG TERM (CURRENT) USE OF ASPIRIN: ICD-10-CM

## 2022-10-03 DIAGNOSIS — E11.9 TYPE 2 DIABETES MELLITUS WITHOUT COMPLICATIONS: ICD-10-CM

## 2022-10-03 DIAGNOSIS — U07.1 COVID-19: ICD-10-CM

## 2022-10-03 DIAGNOSIS — Z79.02 LONG TERM (CURRENT) USE OF ANTITHROMBOTICS/ANTIPLATELETS: ICD-10-CM

## 2022-10-03 DIAGNOSIS — R09.81 NASAL CONGESTION: ICD-10-CM

## 2022-10-03 DIAGNOSIS — R07.89 OTHER CHEST PAIN: ICD-10-CM

## 2022-10-03 DIAGNOSIS — I10 ESSENTIAL (PRIMARY) HYPERTENSION: ICD-10-CM

## 2022-10-03 DIAGNOSIS — Z90.5 ACQUIRED ABSENCE OF KIDNEY: Chronic | ICD-10-CM

## 2022-10-03 DIAGNOSIS — Z79.4 LONG TERM (CURRENT) USE OF INSULIN: ICD-10-CM

## 2022-10-03 DIAGNOSIS — R05.1 ACUTE COUGH: ICD-10-CM

## 2022-10-03 LAB — GLUCOSE BLDC GLUCOMTR-MCNC: 164 MG/DL — HIGH (ref 70–99)

## 2022-10-03 PROCEDURE — 93010 ELECTROCARDIOGRAM REPORT: CPT

## 2022-10-03 PROCEDURE — 99284 EMERGENCY DEPT VISIT MOD MDM: CPT

## 2022-10-04 VITALS
SYSTOLIC BLOOD PRESSURE: 145 MMHG | OXYGEN SATURATION: 96 % | DIASTOLIC BLOOD PRESSURE: 80 MMHG | RESPIRATION RATE: 17 BRPM | HEART RATE: 100 BPM | TEMPERATURE: 99 F

## 2022-10-04 LAB
ALBUMIN SERPL ELPH-MCNC: 3.2 G/DL — LOW (ref 3.3–5)
ALP SERPL-CCNC: 91 U/L — SIGNIFICANT CHANGE UP (ref 40–120)
ALT FLD-CCNC: 30 U/L — SIGNIFICANT CHANGE UP (ref 12–78)
ANION GAP SERPL CALC-SCNC: 6 MMOL/L — SIGNIFICANT CHANGE UP (ref 5–17)
AST SERPL-CCNC: 20 U/L — SIGNIFICANT CHANGE UP (ref 15–37)
BASOPHILS # BLD AUTO: 0.03 K/UL — SIGNIFICANT CHANGE UP (ref 0–0.2)
BASOPHILS NFR BLD AUTO: 0.3 % — SIGNIFICANT CHANGE UP (ref 0–2)
BILIRUB SERPL-MCNC: 0.3 MG/DL — SIGNIFICANT CHANGE UP (ref 0.2–1.2)
BUN SERPL-MCNC: 23 MG/DL — SIGNIFICANT CHANGE UP (ref 7–23)
CALCIUM SERPL-MCNC: 10 MG/DL — SIGNIFICANT CHANGE UP (ref 8.5–10.1)
CHLORIDE SERPL-SCNC: 107 MMOL/L — SIGNIFICANT CHANGE UP (ref 96–108)
CO2 SERPL-SCNC: 24 MMOL/L — SIGNIFICANT CHANGE UP (ref 22–31)
CREAT SERPL-MCNC: 1.72 MG/DL — HIGH (ref 0.5–1.3)
EGFR: 41 ML/MIN/1.73M2 — LOW
EOSINOPHIL # BLD AUTO: 0.1 K/UL — SIGNIFICANT CHANGE UP (ref 0–0.5)
EOSINOPHIL NFR BLD AUTO: 0.9 % — SIGNIFICANT CHANGE UP (ref 0–6)
GLUCOSE SERPL-MCNC: 162 MG/DL — HIGH (ref 70–99)
HCT VFR BLD CALC: 40.1 % — SIGNIFICANT CHANGE UP (ref 39–50)
HGB BLD-MCNC: 13.1 G/DL — SIGNIFICANT CHANGE UP (ref 13–17)
IMM GRANULOCYTES NFR BLD AUTO: 0.4 % — SIGNIFICANT CHANGE UP (ref 0–0.9)
LYMPHOCYTES # BLD AUTO: 1.4 K/UL — SIGNIFICANT CHANGE UP (ref 1–3.3)
LYMPHOCYTES # BLD AUTO: 12.4 % — LOW (ref 13–44)
MCHC RBC-ENTMCNC: 29.1 PG — SIGNIFICANT CHANGE UP (ref 27–34)
MCHC RBC-ENTMCNC: 32.7 G/DL — SIGNIFICANT CHANGE UP (ref 32–36)
MCV RBC AUTO: 89.1 FL — SIGNIFICANT CHANGE UP (ref 80–100)
MONOCYTES # BLD AUTO: 1.1 K/UL — HIGH (ref 0–0.9)
MONOCYTES NFR BLD AUTO: 9.8 % — SIGNIFICANT CHANGE UP (ref 2–14)
NEUTROPHILS # BLD AUTO: 8.59 K/UL — HIGH (ref 1.8–7.4)
NEUTROPHILS NFR BLD AUTO: 76.2 % — SIGNIFICANT CHANGE UP (ref 43–77)
NRBC # BLD: 0 /100 WBCS — SIGNIFICANT CHANGE UP (ref 0–0)
PLATELET # BLD AUTO: 202 K/UL — SIGNIFICANT CHANGE UP (ref 150–400)
POTASSIUM SERPL-MCNC: 4.6 MMOL/L — SIGNIFICANT CHANGE UP (ref 3.5–5.3)
POTASSIUM SERPL-SCNC: 4.6 MMOL/L — SIGNIFICANT CHANGE UP (ref 3.5–5.3)
PROT SERPL-MCNC: 6.9 GM/DL — SIGNIFICANT CHANGE UP (ref 6–8.3)
RAPID RVP RESULT: DETECTED
RBC # BLD: 4.5 M/UL — SIGNIFICANT CHANGE UP (ref 4.2–5.8)
RBC # FLD: 13.2 % — SIGNIFICANT CHANGE UP (ref 10.3–14.5)
SARS-COV-2 RNA SPEC QL NAA+PROBE: DETECTED
SODIUM SERPL-SCNC: 137 MMOL/L — SIGNIFICANT CHANGE UP (ref 135–145)
WBC # BLD: 11.26 K/UL — HIGH (ref 3.8–10.5)
WBC # FLD AUTO: 11.26 K/UL — HIGH (ref 3.8–10.5)

## 2022-10-04 PROCEDURE — 71045 X-RAY EXAM CHEST 1 VIEW: CPT | Mod: 26

## 2022-10-04 RX ORDER — ACETAMINOPHEN 500 MG
650 TABLET ORAL ONCE
Refills: 0 | Status: COMPLETED | OUTPATIENT
Start: 2022-10-04 | End: 2022-10-04

## 2022-10-04 RX ADMIN — Medication 650 MILLIGRAM(S): at 02:54

## 2022-10-04 NOTE — ED ADULT NURSE NOTE - OBJECTIVE STATEMENT
patient came here for cold, stuffy nose, cough since 1400 today, denies sob, denies chest pain, denies any pain, denies fever

## 2022-10-04 NOTE — ED PROVIDER NOTE - OBJECTIVE STATEMENT
75 y/o M w/ PMH of HTN, DM presenting w/ coughing and nasal congestion. Triage note reporting chest pain however pt denying this. Reports developing non productive cough and nasal congestion today. Noticed this afternoon he developed fever as well. Took tylenol around 9pm. No known sick contacts. Denies headache, dizziness, blurred vision, chest pain, shortness of breath, abdominal pain, n/v/d/c, urinary symptoms, MSK pain, rash.

## 2022-10-04 NOTE — ED PROVIDER NOTE - CLINICAL SUMMARY MEDICAL DECISION MAKING FREE TEXT BOX
77 y/o M w/ PMH as above presenting w/ cough and fever. Pt overall well appearing, no acute distress. Breathing comfortable on room air. Suspect likely viral URI. Doubtful PNA. Plan for labs, imaging, EKG, meds. Will reassess the need for additional interventions as clinically warranted.

## 2022-10-04 NOTE — ED PROVIDER NOTE - PROGRESS NOTE DETAILS
Attending Alexander: CXR clear. labs w/o emergent findings, Cr at baseline. informed pt. Pt comfortable w/ DC at this time. Recommended calling in morning for RVP results. Informed to f/u w/ PCP this week. Return precautions provided and discussed. Ready for DC.

## 2022-10-04 NOTE — ED PROVIDER NOTE - PATIENT PORTAL LINK FT
You can access the FollowMyHealth Patient Portal offered by [facility] by registering at the following website: [email or web address]. By joining [facility]’s FollowMyHealth portal, you will also be able to view your health information using other applications (apps) compatible with our system.

## 2022-10-04 NOTE — ED PROVIDER NOTE - NSFOLLOWUPINSTRUCTIONS_ED_ALL_ED_FT
1) Follow up with your doctor this week  2) Return to the ED immediately for new or worsening symptoms   3) Please continue to take any home medications as prescribed  4) Your test results from your ED visit were discussed with you prior to discharge  5) You were provided with a copy of your test results  6) Please call the ER in the morning for your RVP results  7) Please take Tylenol 650 mg every 4 hours as needed for pain. Please do not exceed more than 4,000mg of Tylenol in a day     Viral Respiratory Infection    A viral respiratory infection is an illness that affects parts of the body used for breathing, like the lungs, nose, and throat. It is caused by a germ called a virus. Symptoms can include runny nose, coughing, sneezing, fatigue, body aches, sore throat, fever, or headache. Over the counter medicine can be used to manage the symptoms but the infection typically goes away on its own in 5 to 10 days.     SEEK IMMEDIATE MEDICAL CARE IF YOU HAVE ANY OF THE FOLLOWING SYMPTOMS: shortness of breath, chest pain, fever over 10 days, or lightheadedness/dizziness.     Cough    Coughing is a reflex that clears your throat and your airways. Coughing helps to heal and protect your lungs. It is normal to cough occasionally, but a cough that happens with other symptoms or lasts a long time may be a sign of a condition that needs treatment. Coughing may be caused by infections, asthma or COPD, smoking, postnasal drip, gastroesophageal reflux, as well as other medical conditions. Take medicines only as instructed by your health care provider. Avoid environments or triggers that causes you to cough at work or at home.    SEEK IMMEDIATE MEDICAL CARE IF YOU HAVE ANY OF THE FOLLOWING SYMPTOMS: coughing up blood, shortness of breath, rapid heart rate, chest pain, unexplained weight loss or night sweats.    Fever    A fever is an increase in the body's temperature above 100.4°F (38°C) or higher. In adults and children older than three months, a brief mild or moderate fever generally has no long-term effect, and it usually does not require treatment. Many times, fevers are the result of viral infections, which are self-resolving.  However, certain symptoms or diagnostic tests may suggest a bacterial infection that may respond to antibiotics. Take medications as directed by your health care provider.    SEEK IMMEDIATE MEDICAL CARE IF YOU OR YOUR CHILD HAVE ANY OF THE FOLLOWING SYMPTOMS : shortness of breath, seizure, rash/stiff neck/headache, severe abdominal pain, persistent vomiting, any signs of dehydration, or if your child has a fever for over five (5) days.

## 2022-10-04 NOTE — ED ADULT NURSE NOTE - NSIMPLEMENTINTERV_GEN_ALL_ED
Implemented All Universal Safety Interventions:  [place] to call system. Call bell, personal items and telephone within reach. Instruct patient to call for assistance. Room bathroom lighting operational. Non-slip footwear when patient is off stretcher. Physically safe environment: no spills, clutter or unnecessary equipment. Stretcher in lowest position, wheels locked, appropriate side rails in place.

## 2022-10-09 ENCOUNTER — RX RENEWAL (OUTPATIENT)
Age: 77
End: 2022-10-09

## 2022-10-18 RX ORDER — GABAPENTIN 100 MG/1
100 CAPSULE ORAL
Refills: 0 | Status: ACTIVE | COMMUNITY
Start: 2021-04-19

## 2022-10-19 ENCOUNTER — OFFICE (OUTPATIENT)
Dept: URBAN - METROPOLITAN AREA CLINIC 93 | Facility: CLINIC | Age: 77
Setting detail: OPHTHALMOLOGY
End: 2022-10-19
Payer: MEDICARE

## 2022-10-19 ENCOUNTER — RX ONLY (RX ONLY)
Age: 77
End: 2022-10-19

## 2022-10-19 DIAGNOSIS — H35.372: ICD-10-CM

## 2022-10-19 DIAGNOSIS — E13.3312: ICD-10-CM

## 2022-10-19 DIAGNOSIS — H33.312: ICD-10-CM

## 2022-10-19 DIAGNOSIS — H35.3131: ICD-10-CM

## 2022-10-19 DIAGNOSIS — E13.3291: ICD-10-CM

## 2022-10-19 PROCEDURE — 99213 OFFICE O/P EST LOW 20 MIN: CPT | Performed by: OPHTHALMOLOGY

## 2022-10-19 PROCEDURE — 92134 CPTRZ OPH DX IMG PST SGM RTA: CPT | Performed by: OPHTHALMOLOGY

## 2022-10-19 PROCEDURE — 67028 INJECTION EYE DRUG: CPT | Performed by: OPHTHALMOLOGY

## 2022-10-19 ASSESSMENT — CONFRONTATIONAL VISUAL FIELD TEST (CVF)
OS_FINDINGS: FULL
OD_FINDINGS: FULL

## 2022-10-19 ASSESSMENT — VISUAL ACUITY
OS_BCVA: 20/30-
OD_BCVA: 20/40

## 2022-10-20 ENCOUNTER — APPOINTMENT (OUTPATIENT)
Dept: UROLOGY | Facility: CLINIC | Age: 77
End: 2022-10-20

## 2022-10-20 VITALS
OXYGEN SATURATION: 97 % | TEMPERATURE: 97.5 F | HEART RATE: 91 BPM | DIASTOLIC BLOOD PRESSURE: 78 MMHG | SYSTOLIC BLOOD PRESSURE: 124 MMHG | HEIGHT: 65 IN | BODY MASS INDEX: 27.16 KG/M2 | WEIGHT: 163 LBS

## 2022-10-20 PROCEDURE — 51798 US URINE CAPACITY MEASURE: CPT

## 2022-10-20 PROCEDURE — 51741 ELECTRO-UROFLOWMETRY FIRST: CPT

## 2022-10-20 RX ORDER — BENZONATATE 100 MG/1
100 CAPSULE ORAL
Refills: 0 | Status: DISCONTINUED | COMMUNITY
Start: 2021-06-21 | End: 2022-10-20

## 2022-10-20 RX ORDER — BENZONATATE 100 MG/1
100 CAPSULE ORAL 3 TIMES DAILY
Qty: 30 | Refills: 0 | Status: DISCONTINUED | COMMUNITY
Start: 2022-03-21 | End: 2022-10-20

## 2022-10-20 RX ORDER — GENTAMICIN SULFATE 1 MG/G
0.1 CREAM TOPICAL
Refills: 0 | Status: DISCONTINUED | COMMUNITY
Start: 2021-04-02 | End: 2022-10-20

## 2022-10-20 RX ORDER — OMEPRAZOLE 20 MG/1
20 CAPSULE, DELAYED RELEASE ORAL
Refills: 0 | Status: DISCONTINUED | COMMUNITY
Start: 2021-05-09 | End: 2022-10-20

## 2022-10-21 LAB
APPEARANCE: CLEAR
BILIRUBIN URINE: NEGATIVE
BLOOD URINE: NEGATIVE
COLOR: NORMAL
GLUCOSE QUALITATIVE U: NEGATIVE
KETONES URINE: NEGATIVE
LEUKOCYTE ESTERASE URINE: NEGATIVE
NITRITE URINE: NEGATIVE
PH URINE: 6
PROTEIN URINE: NORMAL
SPECIFIC GRAVITY URINE: 1.02
UROBILINOGEN URINE: NORMAL

## 2022-10-21 NOTE — HISTORY OF PRESENT ILLNESS
[FreeTextEntry1] : 07/07/2021: Mr. Gomez is a 76 y/o M presenting today for initial evaluation of urinary tract infection. Daytime frequency x 5. N x 1. No fever, but sometimes feels mild chills. Admits sometimes feeling mild burning. Finished taking Cephalexin 500 mg PO BID, taking some other antibiotics , should finish in a day or two. Has h/o diabetes, kidney stones, renal insufficiency. \par \par PVR: 171 cc after two hours. \par \par O/E: uncircumcised penis. balanitis. inflamed foreskin. \par \par Balanitis: Practice foreskin hygiene. We will start Nystatin skin ointment. \par \par RTO in 1 month for PVR and uroflow. \par \par 08/16/2021: Mr. GOMEZ is a 75 year male who presents today for a follow up for solitary kidney. He is doing well. N x 1-2. Daytime frequency x 3-4. He denies hematuria, dysuria, urgency and hesitancy. \par \par Labs on 07/08/2021: BUN: 18 mg/dL; Creatinine: 1.68 mg/dl; UA: proteinuria, glycosuria; Culture: negative\par Uroflow not done as pt could not void; PVR at random 87 cc\par \par O/E: balanitis much improved; uncircumcised phallus, adequate meatus, both testes descended, nontender, no mass palpable\par BLAKE: deferred\par \par On Tamsulosin and Finasteride; will continue \par \par Will get PSA and BMP \par Follow up in 3 months to check PSA, BMP, uroflow and bladder scan\par \par \par \par 10/20/2022: Mr. GOMEZ is a 76 year male who presents today for  urgency, wearing a pad for about 2 months has h/o of Enlarged prostate with LUTS \par No burning, N x 2, D q 2-3 hrs, flow is good, no PVR. Has urgency. Urinary stream is good. Voided volume : 98 mL   PVR: 5. 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 GFR is 34 ml/min. \par \par \par O/E normal genitalia.\par \par Suggest; UA and Culture. \par RTC: 6 months Follow up : UA, Culture, Uro -flow, and PVR\par \par \par \par \par \par \par

## 2022-10-21 NOTE — LETTER BODY
[Dear  ___] : Dear  [unfilled], [Consult Letter:] : I had the pleasure of evaluating your patient, [unfilled]. [Please see my note below.] : Please see my note below. [Consult Closing:] : Thank you very much for allowing me to participate in the care of this patient.  If you have any questions, please do not hesitate to contact me. [Sincerely,] : Sincerely, [FreeTextEntry3] : Bandar Macias MD\par

## 2022-10-21 NOTE — PHYSICAL EXAM
[General Appearance - Well Developed] : well developed [General Appearance - Well Nourished] : well nourished [Normal Appearance] : normal appearance [Well Groomed] : well groomed [General Appearance - In No Acute Distress] : no acute distress [Abdomen Soft] : soft [Abdomen Tenderness] : non-tender [Costovertebral Angle Tenderness] : no ~M costovertebral angle tenderness [Urethral Meatus] : meatus normal [Penis Abnormality] : normal uncircumcised penis [Urinary Bladder Findings] : the bladder was normal on palpation [Scrotum] : the scrotum was normal [Rectal Exam - Seminal Vesicles] : the seminal vesicles were normal [Epididymis] : the epididymides were normal [Testes Tenderness] : no tenderness of the testes [Testes Mass (___cm)] : there were no testicular masses [Edema] : no peripheral edema [] : no respiratory distress [Respiration, Rhythm And Depth] : normal respiratory rhythm and effort [Exaggerated Use Of Accessory Muscles For Inspiration] : no accessory muscle use [Oriented To Time, Place, And Person] : oriented to person, place, and time [Affect] : the affect was normal [Mood] : the mood was normal [Not Anxious] : not anxious [Normal Station and Gait] : the gait and station were normal for the patient's age [No Focal Deficits] : no focal deficits [No Palpable Adenopathy] : no palpable adenopathy [FreeTextEntry1] : balanoposthitis; much improved; BLAKE: deferred

## 2022-10-25 ENCOUNTER — APPOINTMENT (OUTPATIENT)
Dept: ENDOCRINOLOGY | Facility: CLINIC | Age: 77
End: 2022-10-25

## 2022-11-10 ENCOUNTER — RX RENEWAL (OUTPATIENT)
Age: 77
End: 2022-11-10

## 2022-11-10 ENCOUNTER — APPOINTMENT (OUTPATIENT)
Dept: ENDOCRINOLOGY | Facility: CLINIC | Age: 77
End: 2022-11-10

## 2022-11-10 VITALS
SYSTOLIC BLOOD PRESSURE: 130 MMHG | WEIGHT: 167 LBS | DIASTOLIC BLOOD PRESSURE: 68 MMHG | BODY MASS INDEX: 27.79 KG/M2 | OXYGEN SATURATION: 98 % | HEART RATE: 70 BPM

## 2022-11-10 LAB — GLUCOSE BLDC GLUCOMTR-MCNC: 196

## 2022-11-10 PROCEDURE — 36415 COLL VENOUS BLD VENIPUNCTURE: CPT

## 2022-11-10 PROCEDURE — 82962 GLUCOSE BLOOD TEST: CPT

## 2022-11-10 PROCEDURE — 99214 OFFICE O/P EST MOD 30 MIN: CPT | Mod: 25

## 2022-11-10 NOTE — ASSESSMENT
[Carbohydrate Consistent Diet] : carbohydrate consistent diet [Hypoglycemia Management] : hypoglycemia management [Diabetes Foot Care] : diabetes foot care [Long Term Vascular Complications] : long term vascular complications of diabetes [Action and use of Insulin] : action and use of short and long-acting insulin [Self Monitoring of Blood Glucose] : self monitoring of blood glucose [FreeTextEntry1] : 1. T2DM, severely  uncontrolled with multiple microvascular complications\par Extremely poor adherence and poor understanding of the disease\par - advised on meds compliance and diabetic complications\par - will attempt to contact his children to get them involved\par - do not use ozempic! \par - start Trulicity 0.75 mg qw \par - Will consider using a small dose of SGLT2 inhibitors\par - resume Omnipod (same settings ) and Breanna  2\par - advised to bring the reports of his stress tests/ echo\par - podiatry evaluation\par \par 2. Hypercalcemia/ HPT\par - monitor ca/PTH\par - add vitamin D3 2000 IU/day\par - once D repleted, will collect 24h urine calcium\par Must f/u with CDE within 2 weeks\par

## 2022-11-10 NOTE — HISTORY OF PRESENT ILLNESS
[FreeTextEntry1] : F/u for diabetes management\par \par *** Nov 10, 2022 ***\par \par feels ok, denies any c/o\par stopped Omnipod several days ago ( can't explain why), resumed  Lantus 10 un HS,  but mostly not using Humalog\par never resumed Trulicity. Received a sample of Ozempic but did not start it either (not comfortable using it)\par not using CGM recently, did not bring his reader for review\par \par *** Aug 10, 2022 ***\par \par feels ok\par taking Lantus 9 to 10 units HS, Humalog 3 to 4 un ac B only\par not taking trulicity yet\par wearing breanna\par Date of download:  08/10/2022 \par Diabetes Medications and Dosage: as above\par Indication for CGMS: verify a change in the treatment regimen, identify periods of hypoglycemia/ hyperglycemia. \par Modal day report: pattern. \par Pt with HYPO  0% of the time ( NONE below 54),  47% in target range\par Hyperglycemia:  53% elevation \par Identified issues: carbohydrate counting, spikes post lunch and dinner\par dates analyzed: 7/28/22 - 08/10/2022\par \par \par *** Apr 20, 2022 ***\par \par missed f/u visits again. admitted in 03/22 for uncontrolled sugar. Since discharge, more compliant with the diet, BS monitoring and taking his meds\par presently is on Lantus 8 to 9 units, not using Humalog scale but take son average 4 un ac meals\par stopped  Januvia \par last creat- 1.86\par \par Date of download:  4/20/22\par Diabetes Medications and Dosage: as above\par Indication for CGMS: verify a change in the treatment regimen, identify periods of hypoglycemia/ hyperglycemia. \par Modal day report: pattern. \par Pt with HYPO  2% of the time (NONE below 54),  72% in target range\par Hyperglycemia: 26 % elevation \par Identified issues: carbohydrate counting\par dates analyzed: 4/7/20-4/20/22\par \par \par *** Nov 10, 2021 ***\par \par missed f/u appts\par taking Lantus 14 units,  Humalog ac B and ac D - skips ac lunch, not using  Januvia \par labs from 8/21- a1c- 9.2, creat- 1.69, potassium- 6.9\par \par wearing Breanna\par Date of download:  11/10/21\par Diabetes Medications and Dosage: as above\par Indication for CGMS: verify a change in the treatment regimen, identify periods of hypoglycemia/ hyperglycemia. \par Modal day report: pattern. \par Pt with HYPO  0% of the time (NONE below 54),  36% in target range\par Hyperglycemia:  64% elevation \par Identified issues: carbohydrate counting\par dates analyzed: 10/28/21-11/10/21\par \par *** Oct 26, 2020 ***\par \par last visit in 11/19\par recovered from COVID\par on  Lantus 14 units but stopped taking Humalog\par labs from 9/20- a1c- 9.1, creat- 1.84, ca-9.7\par drives cab, irregular eating habits\par \par *** Nov 08, 2019 ***\par \par Patient returned for breanna interpretation and diabetes management\par see details below:\par \par Date sensor was placed: 10/18/19\par Date of download:  11/6/19\par Diabetes Medications and Dosage: taking lantus 15 to 18 un qd 4/wk, prandin 1mg ac cerain meals\par Indication for CGMS: verify a change in the treatment regimen, identify periods of hypoglycemia/ hyperglycemia. \par Modal day report: pattern. \par Hypoglycemia: patterned, Pt with HYPO 7 % of the time (2% below 54), 65 % in target range\par Hyperglycemia: 28 % elevation \par Identified issues: carbohydrate counting; hypo's after taking prandin and not eating\par dates analyzed: 10/18/19 - 10/30/19\par \par took 15 units of lantus this am, and prandin 1mg ac B- today in the office p/B- 232\par a1c- 8.0,  urine m/alb- 112, \par c-pept- 3.5 w/ insulin -7.9\par creat- 1.78, K- 5.4, ca- 10.6\par LDL- 88\par \par HISTORY OF PRESENT ILLNESS. \par \par Mr. MINOR was diagnosed with Diabetes Mellitus Type 2 in 1997\par Reports history HTN, dyslipidemia, renal hemorrhage (s/p right nephrectomy), PRDRP (s/p laser therapy)\par He denies CAD. He was previously on Lantus 10 units , Humalog and metformin, but stopped everything about 2-3 months ago\par Presently on repaglinide (not sure of the dose)\par Blood sugars are checked once a day. \par Did not bring log book, but reported are typically as following: FBS- 117-200, not checking other times \par Hypoglycemia frequency: twice a month\par Fingerstick glucose in the office today is 133 mg/dL  hours after eating. \par Diet: not following ADA\par Exercise: none\par \par \par ****\par Last dilated eye - 10/21\par Last podiatry visit  - 10/21\par Last cardiology evaluation - 2017\par Last stress test - 2017, normal per patient\par Last 2-D Echo - 2017, normal per patient\par Last nephrology evaluation - 6/19 \par Last neurology evaluation- none\par \par

## 2022-11-11 ENCOUNTER — TRANSCRIPTION ENCOUNTER (OUTPATIENT)
Age: 77
End: 2022-11-11

## 2022-11-11 LAB
25(OH)D3 SERPL-MCNC: 43.9 NG/ML
ALBUMIN SERPL ELPH-MCNC: 4.3 G/DL
ALP BLD-CCNC: 101 U/L
ALT SERPL-CCNC: 22 U/L
ANION GAP SERPL CALC-SCNC: 10 MMOL/L
AST SERPL-CCNC: 21 U/L
BILIRUB SERPL-MCNC: 0.4 MG/DL
BUN SERPL-MCNC: 28 MG/DL
CA-I SERPL-SCNC: 5.3 MG/DL
CALCIUM SERPL-MCNC: 10.5 MG/DL
CALCIUM SERPL-MCNC: 10.5 MG/DL
CHLORIDE SERPL-SCNC: 103 MMOL/L
CHOLEST SERPL-MCNC: 141 MG/DL
CO2 SERPL-SCNC: 24 MMOL/L
CREAT SERPL-MCNC: 2.01 MG/DL
CREAT SPEC-SCNC: 50 MG/DL
EGFR: 34 ML/MIN/1.73M2
ESTIMATED AVERAGE GLUCOSE: 189 MG/DL
FRUCTOSAMINE SERPL-MCNC: 358 UMOL/L
GLUCOSE SERPL-MCNC: 199 MG/DL
HBA1C MFR BLD HPLC: 8.2 %
HDLC SERPL-MCNC: 39 MG/DL
LDLC SERPL CALC-MCNC: 84 MG/DL
MICROALBUMIN 24H UR DL<=1MG/L-MCNC: 8.7 MG/DL
MICROALBUMIN/CREAT 24H UR-RTO: 174 MG/G
NONHDLC SERPL-MCNC: 102 MG/DL
PARATHYROID HORMONE INTACT: 148 PG/ML
POTASSIUM SERPL-SCNC: 5.1 MMOL/L
PROT SERPL-MCNC: 7 G/DL
SODIUM SERPL-SCNC: 137 MMOL/L
T4 FREE SERPL-MCNC: 1.6 NG/DL
TRIGL SERPL-MCNC: 90 MG/DL
TSH SERPL-ACNC: 1.57 UIU/ML

## 2022-11-16 ENCOUNTER — OFFICE (OUTPATIENT)
Dept: URBAN - METROPOLITAN AREA CLINIC 93 | Facility: CLINIC | Age: 77
Setting detail: OPHTHALMOLOGY
End: 2022-11-16
Payer: MEDICARE

## 2022-11-16 DIAGNOSIS — H33.312: ICD-10-CM

## 2022-11-16 DIAGNOSIS — H35.372: ICD-10-CM

## 2022-11-16 DIAGNOSIS — E13.3291: ICD-10-CM

## 2022-11-16 DIAGNOSIS — E13.3312: ICD-10-CM

## 2022-11-16 DIAGNOSIS — H35.3131: ICD-10-CM

## 2022-11-16 PROCEDURE — 92012 INTRM OPH EXAM EST PATIENT: CPT | Performed by: OPHTHALMOLOGY

## 2022-11-16 PROCEDURE — 92134 CPTRZ OPH DX IMG PST SGM RTA: CPT | Performed by: OPHTHALMOLOGY

## 2022-11-16 PROCEDURE — 67028 INJECTION EYE DRUG: CPT | Performed by: OPHTHALMOLOGY

## 2022-11-16 ASSESSMENT — VISUAL ACUITY
OS_BCVA: 20/40
OD_BCVA: 20/40-2

## 2022-11-16 ASSESSMENT — CONFRONTATIONAL VISUAL FIELD TEST (CVF)
OD_FINDINGS: FULL
OS_FINDINGS: FULL

## 2022-11-21 ENCOUNTER — APPOINTMENT (OUTPATIENT)
Dept: ENDOCRINOLOGY | Facility: CLINIC | Age: 77
End: 2022-11-21

## 2022-11-21 VITALS
RESPIRATION RATE: 17 BRPM | HEIGHT: 65 IN | OXYGEN SATURATION: 97 % | HEART RATE: 88 BPM | DIASTOLIC BLOOD PRESSURE: 70 MMHG | WEIGHT: 167 LBS | BODY MASS INDEX: 27.82 KG/M2 | TEMPERATURE: 97.5 F | SYSTOLIC BLOOD PRESSURE: 124 MMHG

## 2022-11-21 LAB — GLUCOSE BLDC GLUCOMTR-MCNC: 164

## 2022-11-21 PROCEDURE — 82962 GLUCOSE BLOOD TEST: CPT

## 2022-11-21 PROCEDURE — 99214 OFFICE O/P EST MOD 30 MIN: CPT | Mod: 25

## 2022-11-21 NOTE — ASSESSMENT
[Carbohydrate Consistent Diet] : carbohydrate consistent diet [Hypoglycemia Management] : hypoglycemia management [Diabetes Foot Care] : diabetes foot care [Long Term Vascular Complications] : long term vascular complications of diabetes [Action and use of Insulin] : action and use of short and long-acting insulin [Self Monitoring of Blood Glucose] : self monitoring of blood glucose [FreeTextEntry1] : 1. T2DM, severely  uncontrolled with multiple microvascular complications\par Extremely poor adherence and poor understanding of the disease\par - advised on meds compliance and diabetic complications\par - our nurse has contacted patient's daughter last visit, who was supposed come with her father to the appointment\par - I called and spoke with his son (Marc) and d/w PCP- I'm very concerned with his memory and cognitive decline. must see a neurologist and renal. Per my recommendation, patient should not be driving at this point.  \par - resume Omnipod (same settings ) and Breanna  2\par - do not use ozempic! \par - Trulicity 0.75 mg qw \par - Will consider using a small dose of SGLT2 inhibitors\par - advised to son to make a f/u appt with Maribel cedillo and bring his pump/sensor for d/load\par - advised to bring the reports of his stress tests/ echo\par - podiatry evaluation\par \par 2. Hypercalcemia/ HPT\par - monitor ca/PTH\par - add vitamin D3 2000 IU/day\par - once D repleted, will collect 24h urine calcium\par Must f/u with CDE within 2 weeks\par

## 2022-11-21 NOTE — HISTORY OF PRESENT ILLNESS
[FreeTextEntry1] : F/u for diabetes management\par \par *** Nov 21, 2022 ***\par \par came alone. \par back on Omnipod but forgot to bring his PDM\par did not bring his Breanna again- forgets easily. \par taking Trulicity 0.75 mg qw \par can't recall his FS\par \par *** Nov 10, 2022 ***\par \par feels ok, denies any c/o\par stopped Omnipod several days ago ( can't explain why), resumed  Lantus 10 un HS,  but mostly not using Humalog\par never resumed Trulicity. Received a sample of Ozempic but did not start it either (not comfortable using it)\par not using CGM recently, did not bring his reader for review\par \par *** Aug 10, 2022 ***\par \par feels ok\par taking Lantus 9 to 10 units HS, Humalog 3 to 4 un ac B only\par not taking trulicity yet\par wearing breanna\par Date of download:  08/10/2022 \par Diabetes Medications and Dosage: as above\par Indication for CGMS: verify a change in the treatment regimen, identify periods of hypoglycemia/ hyperglycemia. \par Modal day report: pattern. \par Pt with HYPO  0% of the time ( NONE below 54),  47% in target range\par Hyperglycemia:  53% elevation \par Identified issues: carbohydrate counting, spikes post lunch and dinner\par dates analyzed: 7/28/22 - 08/10/2022\par \par \par *** Apr 20, 2022 ***\par \par missed f/u visits again. admitted in 03/22 for uncontrolled sugar. Since discharge, more compliant with the diet, BS monitoring and taking his meds\par presently is on Lantus 8 to 9 units, not using Humalog scale but take son average 4 un ac meals\par stopped  Januvia \par last creat- 1.86\par \par Date of download:  4/20/22\par Diabetes Medications and Dosage: as above\par Indication for CGMS: verify a change in the treatment regimen, identify periods of hypoglycemia/ hyperglycemia. \par Modal day report: pattern. \par Pt with HYPO  2% of the time (NONE below 54),  72% in target range\par Hyperglycemia: 26 % elevation \par Identified issues: carbohydrate counting\par dates analyzed: 4/7/20-4/20/22\par \par \par *** Nov 10, 2021 ***\par \par missed f/u appts\par taking Lantus 14 units,  Humalog ac B and ac D - skips ac lunch, not using  Januvia \par labs from 8/21- a1c- 9.2, creat- 1.69, potassium- 6.9\par \par wearing Breanna\par Date of download:  11/10/21\par Diabetes Medications and Dosage: as above\par Indication for CGMS: verify a change in the treatment regimen, identify periods of hypoglycemia/ hyperglycemia. \par Modal day report: pattern. \par Pt with HYPO  0% of the time (NONE below 54),  36% in target range\par Hyperglycemia:  64% elevation \par Identified issues: carbohydrate counting\par dates analyzed: 10/28/21-11/10/21\par \par *** Oct 26, 2020 ***\par \par last visit in 11/19\par recovered from COVID\par on  Lantus 14 units but stopped taking Humalog\par labs from 9/20- a1c- 9.1, creat- 1.84, ca-9.7\par drives cab, irregular eating habits\par \par *** Nov 08, 2019 ***\par \par Patient returned for breanna interpretation and diabetes management\par see details below:\par \par Date sensor was placed: 10/18/19\par Date of download:  11/6/19\par Diabetes Medications and Dosage: taking lantus 15 to 18 un qd 4/wk, prandin 1mg ac cerain meals\par Indication for CGMS: verify a change in the treatment regimen, identify periods of hypoglycemia/ hyperglycemia. \par Modal day report: pattern. \par Hypoglycemia: patterned, Pt with HYPO 7 % of the time (2% below 54), 65 % in target range\par Hyperglycemia: 28 % elevation \par Identified issues: carbohydrate counting; hypo's after taking prandin and not eating\par dates analyzed: 10/18/19 - 10/30/19\par \par took 15 units of lantus this am, and prandin 1mg ac B- today in the office p/B- 232\par a1c- 8.0,  urine m/alb- 112, \par c-pept- 3.5 w/ insulin -7.9\par creat- 1.78, K- 5.4, ca- 10.6\par LDL- 88\par \par HISTORY OF PRESENT ILLNESS. \par \par Mr. MINOR was diagnosed with Diabetes Mellitus Type 2 in 1997\par Reports history HTN, dyslipidemia, renal hemorrhage (s/p right nephrectomy), PRDRP (s/p laser therapy)\par He denies CAD. He was previously on Lantus 10 units , Humalog and metformin, but stopped everything about 2-3 months ago\par Presently on repaglinide (not sure of the dose)\par Blood sugars are checked once a day. \par Did not bring log book, but reported are typically as following: FBS- 117-200, not checking other times \par Hypoglycemia frequency: twice a month\par Fingerstick glucose in the office today is 133 mg/dL  hours after eating. \par Diet: not following ADA\par Exercise: none\par \par \par ****\par Last dilated eye - 10/21\par Last podiatry visit  - 10/21\par Last cardiology evaluation - 2017\par Last stress test - 2017, normal per patient\par Last 2-D Echo - 2017, normal per patient\par Last nephrology evaluation - 6/19 \par Last neurology evaluation- none\par \par

## 2022-11-30 ENCOUNTER — APPOINTMENT (OUTPATIENT)
Dept: ENDOCRINOLOGY | Facility: CLINIC | Age: 77
End: 2022-11-30

## 2022-11-30 PROCEDURE — G0108 DIAB MANAGE TRN  PER INDIV: CPT | Mod: GA

## 2022-12-13 ENCOUNTER — NON-APPOINTMENT (OUTPATIENT)
Age: 77
End: 2022-12-13

## 2022-12-13 ENCOUNTER — RX RENEWAL (OUTPATIENT)
Age: 77
End: 2022-12-13

## 2022-12-13 ENCOUNTER — APPOINTMENT (OUTPATIENT)
Dept: INTERNAL MEDICINE | Facility: CLINIC | Age: 77
End: 2022-12-13

## 2022-12-13 ENCOUNTER — LABORATORY RESULT (OUTPATIENT)
Age: 77
End: 2022-12-13

## 2022-12-13 VITALS
HEART RATE: 86 BPM | DIASTOLIC BLOOD PRESSURE: 75 MMHG | BODY MASS INDEX: 27.82 KG/M2 | SYSTOLIC BLOOD PRESSURE: 120 MMHG | OXYGEN SATURATION: 97 % | HEIGHT: 65 IN | WEIGHT: 167 LBS | TEMPERATURE: 97.6 F

## 2022-12-13 VITALS — DIASTOLIC BLOOD PRESSURE: 70 MMHG | SYSTOLIC BLOOD PRESSURE: 120 MMHG

## 2022-12-13 DIAGNOSIS — R06.02 SHORTNESS OF BREATH: ICD-10-CM

## 2022-12-13 DIAGNOSIS — Z00.00 ENCOUNTER FOR GENERAL ADULT MEDICAL EXAMINATION W/OUT ABNORMAL FINDINGS: ICD-10-CM

## 2022-12-13 PROCEDURE — 36415 COLL VENOUS BLD VENIPUNCTURE: CPT

## 2022-12-13 PROCEDURE — G0442 ANNUAL ALCOHOL SCREEN 15 MIN: CPT

## 2022-12-13 PROCEDURE — 93000 ELECTROCARDIOGRAM COMPLETE: CPT | Mod: 59

## 2022-12-13 PROCEDURE — G0439: CPT

## 2022-12-13 PROCEDURE — G0444 DEPRESSION SCREEN ANNUAL: CPT

## 2022-12-13 NOTE — HEALTH RISK ASSESSMENT
[Fair] :  ~his/her~ mood as fair [Intercurrent hospitalizations] : was admitted to the hospital  [Never] : Never [Yes] : Yes [Monthly or less (1 pt)] : Monthly or less (1 point) [1 or 2 (0 pts)] : 1 or 2 (0 points) [Never (0 pts)] : Never (0 points) [No] : In the past 12 months have you used drugs other than those required for medical reasons? No [One fall no injury in past year] : Patient reported one fall in the past year without injury [2] : 1) Little interest or pleasure doing things for more than half of the days (2) [3] : 2) Feeling down, depressed, or hopeless for nearly every day (3) [1/2 of Days or More (2)] : 4.) Feeling tired or having little energy? Half the days or more [Not at All (0)] : 6.) Feeling bad about yourself, or that you are a failure, or have let yourself or your family down? Not at all [Several Days (1)] : 7.) Trouble concentrating on things, such as reading a newspaper or watching television? Several days [Nearly Every Day (3)] : 8.) Moving or speaking so slowly that other people could have noticed, or the opposite, moving or speaking faster than usual? Nearly every day [Moderate] : severity of depression is moderate [Somewhat Difficult] : How difficult have these problems made it for you to do your work, take care of things at home, or get along with people? Somewhat difficult [PHQ-9 Positive] : PHQ-9 Positive [I have developed a follow-up plan documented below in the note.] : I have developed a follow-up plan documented below in the note. [None] : None [With Family] : lives with family [# of Members in Household ___] :  household currently consist of [unfilled] member(s) [Employed] : employed [College] : College [] :  [# Of Children ___] : has [unfilled] children [Feels Safe at Home] : Feels safe at home [Fully functional (bathing, dressing, toileting, transferring, walking, feeding)] : Fully functional (bathing, dressing, toileting, transferring, walking, feeding) [Fully functional (using the telephone, shopping, preparing meals, housekeeping, doing laundry, using] : Fully functional and needs no help or supervision to perform IADLs (using the telephone, shopping, preparing meals, housekeeping, doing laundry, using transportation, managing medications and managing finances) [Smoke Detector] : smoke detector [Carbon Monoxide Detector] : carbon monoxide detector [Seat Belt] :  uses seat belt [Retinopathy] : Retinopathy [PHQ-2 Positive] : PHQ-2 Positive [HIV test declined] : HIV test declined [Hepatitis C test declined] : Hepatitis C test declined [Change in mental status noted] : Change in mental status noted [Learning/Retaining New Information] : difficulty learning/retaining new information [Reports changes in vision] : Reports changes in vision [de-identified] : Stroke in March - 4 days in hospital [de-identified] : Endocrinologist, urologist, nephrologist, neurologist [Audit-CScore] : 1 [de-identified] : no [de-identified] : regular [OCS3Iffhv] : 5 [[id number]] : 12 [EyeExamDate] : 06/2022 [Language] : denies difficulty with language [Handling Complex Tasks] : denies difficulty handling complex tasks [Sexually Active] : not sexually active [Reports changes in hearing] : Reports no changes in hearing [Reports changes in dental health] : Reports no changes in dental health [Guns at Home] : no guns at home [Sunscreen] : does not use sunscreen [TB Exposure] : is not being exposed to tuberculosis [Caregiver Concerns] : does not have caregiver concerns [ColonoscopyDate] : 2017 [ColonoscopyComments] : Patient does not remember result of colonoscopy. Reports he was not asked to come back.  [de-identified] : decrease memory [FreeTextEntry2] : works as  [de-identified] : [employer] [FreeTextEntry4] : Patient encouraged to talk to family about advance care planning.

## 2022-12-13 NOTE — HISTORY OF PRESENT ILLNESS
[FreeTextEntry1] : Annual physical exam [de-identified] : Mr. SHERWIN MINOR 76 year male with a PMH HTN, uncontrolled DM2, CRI, glaucoma, macular degeneration, non-proliferative diabetic retinopathy (receive Avastin injection in left eye), has problem with memory, h/o renal cell carcinoma, h/o TIA present to the office for annual physical exam. Patient complains of urinary urgency and intermittent dribbling urine but normal flow, however reports that his urinary frequency has improved significantly (a problem since 6-8 months), denies dysuria. \par Patient also complains of shortness of breath with physical activity and exertion but denies chest pain. \par Was seen by endocrinologist recently(consult is appreciated)\par Patient also complains of pain in bilateral knees that is worse in left than right knee.  Has pain on ambulation on/off

## 2022-12-13 NOTE — PLAN
[FreeTextEntry1] : Well adult exam is performed.\par Result of the blood test from 11/10/22 was reviewed and discussed with the patient\par  Patient will do an additional labs today - Urinalysis, psa  - urinary urgency\par -H/o renal cell carcinoma order Chest xray and Renal US, f/u with urologist\par Decrease memory  - Recommend  to f/u with neurologist, patient has an appoitment on 01/06/22\par - F/u with   nephrologist - worsening kidney function. t\par - For knee pain recommended weight loss, exercise and tylenol PRN. If symptoms persist may refer to orthopedic. \par - Refer to cardiologist - stress test\par - Advised not to drive due to memory issues and macular degeneration+nonproliferative diabetic retinopathy. \par - Advised to not use NSAIDS due to elevated creatinine (likely CKD). \par - PHQ-9 Positive screen for moderate depression - will start on antidepressive medication, zoloft 25mg qd.\par - Patient's hyperlipidemia is controlled. Advised to continue current medication Atorvastatin. \par - Patient's blood pressure is currently controlled. Advised to continue amlodipine, atenolol.\par EKG was done and reviewed, NSR 68 bpm, no acute st-t changes.\par - Patient's blood glucose is better controlled (HBA1C improved). Continue close follow up with endocrinologist.\par - Patient's son, Adi, was called and updated about blood work, appointments, and advised about patient's driving status. \par RTC in 2 wks to f/u. Patient is verbalized understanding

## 2022-12-13 NOTE — COUNSELING
[Fall prevention counseling provided] : Fall prevention counseling provided [Adequate lighting] : Adequate lighting [No throw rugs] : No throw rugs [Use proper foot wear] : Use proper foot wear [Use recommended devices] : Use recommended devices [Behavioral health counseling provided] : Behavioral health counseling provided [Sleep ___ hours/day] : Sleep [unfilled] hours/day [Engage in a relaxing activity] : Engage in a relaxing activity [AUDIT-C Screening administered and reviewed] : AUDIT-C Screening administered and reviewed

## 2022-12-15 LAB
APPEARANCE: CLEAR
BILIRUBIN URINE: NEGATIVE
BLOOD URINE: NEGATIVE
COLOR: NORMAL
GLUCOSE QUALITATIVE U: NEGATIVE
KETONES URINE: NEGATIVE
LEUKOCYTE ESTERASE URINE: NEGATIVE
NITRITE URINE: NEGATIVE
PH URINE: 5.5
PROTEIN URINE: ABNORMAL
PSA FREE FLD-MCNC: 45 %
PSA FREE SERPL-MCNC: 0.7 NG/ML
PSA SERPL-MCNC: 1.56 NG/ML
SPECIFIC GRAVITY URINE: 1.02
UROBILINOGEN URINE: NORMAL
VIT B12 SERPL-MCNC: 1041 PG/ML

## 2022-12-27 ENCOUNTER — APPOINTMENT (OUTPATIENT)
Dept: NEPHROLOGY | Facility: CLINIC | Age: 77
End: 2022-12-27
Payer: MEDICARE

## 2023-01-04 ENCOUNTER — APPOINTMENT (OUTPATIENT)
Dept: ENDOCRINOLOGY | Facility: CLINIC | Age: 78
End: 2023-01-04
Payer: MEDICARE

## 2023-01-04 VITALS — HEIGHT: 65 IN | BODY MASS INDEX: 28.32 KG/M2 | WEIGHT: 170 LBS

## 2023-01-04 PROCEDURE — G0108 DIAB MANAGE TRN  PER INDIV: CPT | Mod: GA

## 2023-02-06 ENCOUNTER — RX RENEWAL (OUTPATIENT)
Age: 78
End: 2023-02-06

## 2023-02-13 ENCOUNTER — APPOINTMENT (OUTPATIENT)
Dept: ENDOCRINOLOGY | Facility: CLINIC | Age: 78
End: 2023-02-13
Payer: MEDICARE

## 2023-02-13 VITALS
SYSTOLIC BLOOD PRESSURE: 123 MMHG | HEIGHT: 65 IN | HEART RATE: 72 BPM | WEIGHT: 165.31 LBS | OXYGEN SATURATION: 97 % | DIASTOLIC BLOOD PRESSURE: 77 MMHG | BODY MASS INDEX: 27.54 KG/M2 | TEMPERATURE: 94.5 F

## 2023-02-13 LAB
25(OH)D3 SERPL-MCNC: 60 NG/ML
ALBUMIN SERPL ELPH-MCNC: 4.4 G/DL
ALP BLD-CCNC: 111 U/L
ALT SERPL-CCNC: 18 U/L
ANION GAP SERPL CALC-SCNC: 10 MMOL/L
AST SERPL-CCNC: 15 U/L
BILIRUB SERPL-MCNC: 0.4 MG/DL
BUN SERPL-MCNC: 34 MG/DL
CALCIUM SERPL-MCNC: 10.7 MG/DL
CALCIUM SERPL-MCNC: 10.7 MG/DL
CHLORIDE SERPL-SCNC: 100 MMOL/L
CHOLEST SERPL-MCNC: 161 MG/DL
CO2 SERPL-SCNC: 25 MMOL/L
CREAT SERPL-MCNC: 1.94 MG/DL
EGFR: 35 ML/MIN/1.73M2
ESTIMATED AVERAGE GLUCOSE: 171 MG/DL
GLUCOSE BLDC GLUCOMTR-MCNC: 279
GLUCOSE SERPL-MCNC: 232 MG/DL
HBA1C MFR BLD HPLC: 7.6 %
HDLC SERPL-MCNC: 36 MG/DL
LDLC SERPL CALC-MCNC: 102 MG/DL
NONHDLC SERPL-MCNC: 125 MG/DL
PARATHYROID HORMONE INTACT: 145 PG/ML
POTASSIUM SERPL-SCNC: 4.7 MMOL/L
PROT SERPL-MCNC: 7.1 G/DL
SODIUM SERPL-SCNC: 134 MMOL/L
T4 FREE SERPL-MCNC: 1.4 NG/DL
TRIGL SERPL-MCNC: 116 MG/DL
TSH SERPL-ACNC: 1.79 UIU/ML

## 2023-02-13 PROCEDURE — 82962 GLUCOSE BLOOD TEST: CPT

## 2023-02-13 PROCEDURE — 95251 CONT GLUC MNTR ANALYSIS I&R: CPT

## 2023-02-13 PROCEDURE — 36415 COLL VENOUS BLD VENIPUNCTURE: CPT

## 2023-02-13 PROCEDURE — 99214 OFFICE O/P EST MOD 30 MIN: CPT | Mod: 25

## 2023-02-13 NOTE — HISTORY OF PRESENT ILLNESS
[FreeTextEntry1] : F/u for diabetes management\par \par *** Feb 13, 2023 ***\par \par feels better \par taking Omnipod , trulicity 0.75 mg qw\par basal: 0.65 un/hr\par I:C- 15 gm\par ISF- 50\par target - 100\par however, not using the bolus calculator. injects on ave 3 un for meals, but skips frequently.\par also, injects post meals\par \par Date of download:  02/13/2023 \par Diabetes Medications and Dosage: as above\par Indication for CGMS: verify a change in the treatment regimen, identify periods of hypoglycemia/ hyperglycemia. \par Modal day report: pattern. \par Pt with HYPO  3% of the time ( 3% below 54),  58% in target range\par Hyperglycemia:  36% elevation \par Identified issues: carbohydrate counting\par dates analyzed:  01/31/2023- 02/13/2023\par \par \par *** Nov 21, 2022 ***\par \par came alone. \par back on Omnipod but forgot to bring his PDM\par did not bring his Breanna again- forgets easily. \par taking Trulicity 0.75 mg qw \par can't recall his FS\par \par *** Nov 10, 2022 ***\par \par feels ok, denies any c/o\par stopped Omnipod several days ago ( can't explain why), resumed  Lantus 10 un HS,  but mostly not using Humalog\par never resumed Trulicity. Received a sample of Ozempic but did not start it either (not comfortable using it)\par not using CGM recently, did not bring his reader for review\par \par *** Aug 10, 2022 ***\par \par feels ok\par taking Lantus 9 to 10 units HS, Humalog 3 to 4 un ac B only\par not taking trulicity yet\par wearing breanna\par Date of download:  08/10/2022 \par Diabetes Medications and Dosage: as above\par Indication for CGMS: verify a change in the treatment regimen, identify periods of hypoglycemia/ hyperglycemia. \par Modal day report: pattern. \par Pt with HYPO  0% of the time ( NONE below 54),  47% in target range\par Hyperglycemia:  53% elevation \par Identified issues: carbohydrate counting, spikes post lunch and dinner\par dates analyzed: 7/28/22 - 08/10/2022\par \par \par *** Apr 20, 2022 ***\par \par missed f/u visits again. admitted in 03/22 for uncontrolled sugar. Since discharge, more compliant with the diet, BS monitoring and taking his meds\par presently is on Lantus 8 to 9 units, not using Humalog scale but take son average 4 un ac meals\par stopped  Januvia \par last creat- 1.86\par \par Date of download:  4/20/22\par Diabetes Medications and Dosage: as above\par Indication for CGMS: verify a change in the treatment regimen, identify periods of hypoglycemia/ hyperglycemia. \par Modal day report: pattern. \par Pt with HYPO  2% of the time (NONE below 54),  72% in target range\par Hyperglycemia: 26 % elevation \par Identified issues: carbohydrate counting\par dates analyzed: 4/7/20-4/20/22\par \par \par *** Nov 10, 2021 ***\par \par missed f/u appts\par taking Lantus 14 units,  Humalog ac B and ac D - skips ac lunch, not using  Januvia \par labs from 8/21- a1c- 9.2, creat- 1.69, potassium- 6.9\par \par wearing Breanna\par Date of download:  11/10/21\par Diabetes Medications and Dosage: as above\par Indication for CGMS: verify a change in the treatment regimen, identify periods of hypoglycemia/ hyperglycemia. \par Modal day report: pattern. \par Pt with HYPO  0% of the time (NONE below 54),  36% in target range\par Hyperglycemia:  64% elevation \par Identified issues: carbohydrate counting\par dates analyzed: 10/28/21-11/10/21\par \par *** Oct 26, 2020 ***\par \par last visit in 11/19\par recovered from COVID\par on  Lantus 14 units but stopped taking Humalog\par labs from 9/20- a1c- 9.1, creat- 1.84, ca-9.7\par drives cab, irregular eating habits\par \par *** Nov 08, 2019 ***\par \par Patient returned for breanna interpretation and diabetes management\par see details below:\par \par Date sensor was placed: 10/18/19\par Date of download:  11/6/19\par Diabetes Medications and Dosage: taking lantus 15 to 18 un qd 4/wk, prandin 1mg ac cerain meals\par Indication for CGMS: verify a change in the treatment regimen, identify periods of hypoglycemia/ hyperglycemia. \par Modal day report: pattern. \par Hypoglycemia: patterned, Pt with HYPO 7 % of the time (2% below 54), 65 % in target range\par Hyperglycemia: 28 % elevation \par Identified issues: carbohydrate counting; hypo's after taking prandin and not eating\par dates analyzed: 10/18/19 - 10/30/19\par \par took 15 units of lantus this am, and prandin 1mg ac B- today in the office p/B- 232\par a1c- 8.0,  urine m/alb- 112, \par c-pept- 3.5 w/ insulin -7.9\par creat- 1.78, K- 5.4, ca- 10.6\par LDL- 88\par \par HISTORY OF PRESENT ILLNESS. \par \par Mr. MINOR was diagnosed with Diabetes Mellitus Type 2 in 1997\par Reports history HTN, dyslipidemia, renal hemorrhage (s/p right nephrectomy), PRDRP (s/p laser therapy)\par He denies CAD. He was previously on Lantus 10 units , Humalog and metformin, but stopped everything about 2-3 months ago\par Presently on repaglinide (not sure of the dose)\par Blood sugars are checked once a day. \par Did not bring log book, but reported are typically as following: FBS- 117-200, not checking other times \par Hypoglycemia frequency: twice a month\par Fingerstick glucose in the office today is 133 mg/dL  hours after eating. \par Diet: not following ADA\par Exercise: none\par \par \par ****\par Last dilated eye - 10/21\par Last podiatry visit  - 10/21\par Last cardiology evaluation - 2017\par Last stress test - 2017, normal per patient\par Last 2-D Echo - 2017, normal per patient\par Last nephrology evaluation - 6/19 \par Last neurology evaluation- none\par \par

## 2023-02-13 NOTE — ASSESSMENT
[Diabetes Foot Care] : diabetes foot care [Long Term Vascular Complications] : long term vascular complications of diabetes [Carbohydrate Consistent Diet] : carbohydrate consistent diet [Importance of Diet and Exercise] : importance of diet and exercise to improve glycemic control, achieve weight loss and improve cardiovascular health [Exercise/Effect on Glucose] : exercise/effect on glucose [Hypoglycemia Management] : hypoglycemia management [Glucagon Use] : glucagon use [Ketone Testing] : ketone testing [Action and use of Insulin] : action and use of short and long-acting insulin [Self Monitoring of Blood Glucose] : self monitoring of blood glucose [Insulin Self-Administration] : insulin self-administration [Injection Technique, Storage, Sharps Disposal] : injection technique, storage, and sharps disposal [Sick-Day Management] : sick-day management [Retinopathy Screening] : Patient was referred to ophthalmology for retinopathy screening [Diabetic Medications] : Risks and benefits of diabetic medications were discussed [FreeTextEntry1] : 1. T2DM, severely  uncontrolled with multiple microvascular complications\par Extremely poor adherence and poor understanding of the disease\par - advised on meds compliance and diabetic complications\par - our nurse has contacted patient's daughter last visit, who was supposed come with her father to the appointment\par - I called and spoke with his and d/w PCP- I'm very concerned with his memory and cognitive decline. must see a neurologist and renal. Per my recommendation, patient should not be driving at this point.  \par - basal: \par 12 am - 0.75 un/hr\par 3 am - 0.65 un/hr\par 12 pm - 0.7 un/hr\par 5 pm - 0.65 un/hr\par 9 pm - 0.75 un/hr\par \par will not use bolus calculator (unable to monitor carbs), but will inject 3-4un ac meals\par - Trulicity 0.75 mg qw \par - Will consider using a small dose of SGLT2 inhibitors\par - advised to bring the reports of his stress tests/ echo\par - podiatry evaluation\par \par 2. Hypercalcemia/ HPT\par - monitor ca/PTH\par - add vitamin D3 2000 IU/day\par - once D repleted, will collect 24h urine calcium\par \par \par ***\par son (Aneil)  [phone]\par wife Mrevin - [phone]\par

## 2023-02-14 ENCOUNTER — NON-APPOINTMENT (OUTPATIENT)
Age: 78
End: 2023-02-14

## 2023-02-14 LAB
CA-I SERPL-SCNC: 5.6 MG/DL
CREAT SPEC-SCNC: 132 MG/DL
MICROALBUMIN 24H UR DL<=1MG/L-MCNC: 10.5 MG/DL
MICROALBUMIN/CREAT 24H UR-RTO: 80 MG/G

## 2023-02-19 ENCOUNTER — RX RENEWAL (OUTPATIENT)
Age: 78
End: 2023-02-19

## 2023-02-21 ENCOUNTER — TRANSCRIPTION ENCOUNTER (OUTPATIENT)
Age: 78
End: 2023-02-21

## 2023-02-21 LAB
CAU: 3 MG/DL
CREAT 24H UR-MCNC: 1.7 G/24 H
CREAT 24H UR-MCNC: 1.7 G/24 H
CREAT ?TM UR-MCNC: 57 MG/DL
CREAT ?TM UR-MCNC: 57 MG/DL
PROT ?TM UR-MCNC: 24 HR
PROT ?TM UR-MCNC: 24 HR
SPECIMEN VOL 24H UR: 2925 ML
SPECIMEN VOL 24H UR: 2925 ML
SPECIMEN VOL 24H UR: 88 MG/24 H

## 2023-03-08 ENCOUNTER — APPOINTMENT (OUTPATIENT)
Dept: NEPHROLOGY | Facility: CLINIC | Age: 78
End: 2023-03-08
Payer: MEDICARE

## 2023-03-08 ENCOUNTER — NON-APPOINTMENT (OUTPATIENT)
Age: 78
End: 2023-03-08

## 2023-03-08 ENCOUNTER — LABORATORY RESULT (OUTPATIENT)
Age: 78
End: 2023-03-08

## 2023-03-08 VITALS
OXYGEN SATURATION: 97 % | DIASTOLIC BLOOD PRESSURE: 81 MMHG | BODY MASS INDEX: 27.92 KG/M2 | WEIGHT: 167.55 LBS | SYSTOLIC BLOOD PRESSURE: 142 MMHG | HEART RATE: 74 BPM | TEMPERATURE: 97.2 F | HEIGHT: 65 IN

## 2023-03-08 VITALS — DIASTOLIC BLOOD PRESSURE: 78 MMHG | SYSTOLIC BLOOD PRESSURE: 134 MMHG

## 2023-03-08 PROCEDURE — 99205 OFFICE O/P NEW HI 60 MIN: CPT | Mod: GC

## 2023-03-09 ENCOUNTER — NON-APPOINTMENT (OUTPATIENT)
Age: 78
End: 2023-03-09

## 2023-03-09 NOTE — ASSESSMENT
[FreeTextEntry1] : 76 year male with a PMH HTN x 10 yrs, uncontrolled DM2 x 30 years (diabetic retinopathy, neuropathy), RCC s/p nephrectomy, BPH, seizure, hypercalcemia, glaucoma, h/o TIA sent to us by Dr. Gilbert (endocrinologist) for elevated SCr. \par \par \par #CKD 3b (A2)  likely multifactorial 2/2 nephrectomy, DM nephropathy, HTN nephrosclerosis +/- acute increases due to hypercalcemia\par \par -Baseline SCr has been around 1.6-1.7 since  2019. Last SCr was 1.9 on Feb '23 (improved from 2.01 in Nov '22) \par -this is either elmer or progression of ckd\par -Urine A/Cr ratio is 80 (A2). UA with proteinuria, rest bland. \par -Will repeat UA, UP/C ratio, renal panel, nephritic, nephrotic w/u & renal/bladder sono\par -Asked to hydrate well\par -Will benefit from SGLT2 inhibitor next visit which will help with DM & proteinuria\par -Decrease gabapentin to 100mg tid-pt states he only takes it daily\par \par #hypercalcemia 2/2 primary HPTH\par -Serum calcium has been elevated since 2022 ~10.6 - 10.7. Last serum calcium being 10.7. iPTH was 145 suggesting primary HPTH since 2019. \par -Should stop all vitamin D supplements (will notify pt son in AM) \par \par #HTN- controlled\par -Continue hydralazine 25 bid; atenolol 25, norvasc 10\par -Consider switch to ACE/ ARB next visit\par \par #DM2- \par -Last A1c 7.6 (from 8.2)\par Continue current diabetic medication with A1c goal < 7\par -F/u with endocrine\par -Will benefit from SGLT2 inhibitor\par \par \par #RCC s/p nephrectomy\par -Check Renal sono\par \par #BPH\par -Check PVR for urinary retention\par \par F/U in 3 months \par \par

## 2023-03-17 ENCOUNTER — RX RENEWAL (OUTPATIENT)
Age: 78
End: 2023-03-17

## 2023-03-18 ENCOUNTER — RX RENEWAL (OUTPATIENT)
Age: 78
End: 2023-03-18

## 2023-03-20 LAB
24R-OH-CALCIDIOL SERPL-MCNC: 79.8 PG/ML
25(OH)D3 SERPL-MCNC: 57.8 NG/ML
ACE BLD-CCNC: 37 U/L
ALBUMIN MFR SERPL ELPH: 58.1 %
ALBUMIN SERPL ELPH-MCNC: 3.7 G/DL
ALBUMIN SERPL-MCNC: 3.6 G/DL
ALBUMIN/GLOB SERPL: 1.4 RATIO
ALPHA1 GLOB MFR SERPL ELPH: 3.9 %
ALPHA1 GLOB SERPL ELPH-MCNC: 0.2 G/DL
ALPHA2 GLOB MFR SERPL ELPH: 9.6 %
ALPHA2 GLOB SERPL ELPH-MCNC: 0.6 G/DL
ANA SER IF-ACNC: NEGATIVE
ANION GAP SERPL CALC-SCNC: 9 MMOL/L
APPEARANCE: CLEAR
B-GLOBULIN MFR SERPL ELPH: 12.6 %
B-GLOBULIN SERPL ELPH-MCNC: 0.8 G/DL
BACTERIA: NEGATIVE
BASOPHILS # BLD AUTO: 0.04 K/UL
BASOPHILS NFR BLD AUTO: 0.5 %
BILIRUBIN URINE: NEGATIVE
BLOOD URINE: NEGATIVE
BUN SERPL-MCNC: 26 MG/DL
C3 SERPL-MCNC: 97 MG/DL
C4 SERPL-MCNC: 26 MG/DL
CA-I SERPL-SCNC: 5.2 MG/DL
CALCIUM ?TM UR-MCNC: 1.1 MG/DL
CALCIUM SERPL-MCNC: 10.3 MG/DL
CALCIUM SERPL-MCNC: 10.3 MG/DL
CALCIUM/CREAT UR: 0 RATIO
CHLORIDE SERPL-SCNC: 104 MMOL/L
CO2 SERPL-SCNC: 22 MMOL/L
COLOR: NORMAL
CREAT SERPL-MCNC: 1.78 MG/DL
CREAT SPEC-SCNC: 54 MG/DL
CREAT SPEC-SCNC: 54 MG/DL
CREAT SPEC-SCNC: 56 MG/DL
CREAT/PROT UR: 0.2 RATIO
DEPRECATED KAPPA LC FREE/LAMBDA SER: 1.42 RATIO
DSDNA AB SER-ACNC: <12 IU/ML
EGFR: 39 ML/MIN/1.73M2
EOSINOPHIL # BLD AUTO: 0.18 K/UL
EOSINOPHIL NFR BLD AUTO: 2.4 %
ESTIMATED AVERAGE GLUCOSE: 171 MG/DL
FERRITIN SERPL-MCNC: 294 NG/ML
GAMMA GLOB FLD ELPH-MCNC: 1 G/DL
GAMMA GLOB MFR SERPL ELPH: 15.8 %
GBM AB TITR SER IF: <0.2
GLUCOSE QUALITATIVE U: NEGATIVE
GLUCOSE SERPL-MCNC: 121 MG/DL
HBA1C MFR BLD HPLC: 7.6 %
HBV CORE IGG+IGM SER QL: NONREACTIVE
HBV CORE IGM SER QL: NONREACTIVE
HBV SURFACE AB SER QL: NONREACTIVE
HBV SURFACE AG SER QL: NONREACTIVE
HCT VFR BLD CALC: 40.7 %
HCV RNA SERPL NAA+PROBE-LOG IU: NOT DETECTED LOGIU/ML
HEPC RNA INTERP: NOT DETECTED
HGB BLD-MCNC: 12.9 G/DL
HIV1+2 AB SPEC QL IA.RAPID: NONREACTIVE
HYALINE CASTS: 0 /LPF
IMM GRANULOCYTES NFR BLD AUTO: 0.1 %
INTERPRETATION SERPL IEP-IMP: NORMAL
IRON SATN MFR SERPL: 13 %
IRON SERPL-MCNC: 35 UG/DL
KAPPA LC CSF-MCNC: 3.1 MG/DL
KAPPA LC SERPL-MCNC: 4.39 MG/DL
KETONES URINE: NEGATIVE
LEUKOCYTE ESTERASE URINE: NEGATIVE
LYMPHOCYTES # BLD AUTO: 1.64 K/UL
LYMPHOCYTES NFR BLD AUTO: 21.9 %
M PROTEIN SPEC IFE-MCNC: NORMAL
MAGNESIUM SERPL-MCNC: 1.9 MG/DL
MAN DIFF?: NORMAL
MCHC RBC-ENTMCNC: 29.2 PG
MCHC RBC-ENTMCNC: 31.7 GM/DL
MCV RBC AUTO: 92.1 FL
MICROALBUMIN 24H UR DL<=1MG/L-MCNC: 4.4 MG/DL
MICROALBUMIN/CREAT 24H UR-RTO: 82 MG/G
MICROSCOPIC-UA: NORMAL
MONOCYTES # BLD AUTO: 0.67 K/UL
MONOCYTES NFR BLD AUTO: 8.9 %
NEUTROPHILS # BLD AUTO: 4.96 K/UL
NEUTROPHILS NFR BLD AUTO: 66.2 %
NITRITE URINE: NEGATIVE
PARATHYROID HORMONE INTACT: 179 PG/ML
PH URINE: 6.5
PHOSPHATE SERPL-MCNC: 3.1 MG/DL
PHOSPHOLIPASE A2 RECEPTOR ELISA: <1.8 RU/ML
PLATELET # BLD AUTO: 170 K/UL
POTASSIUM SERPL-SCNC: 4.9 MMOL/L
PROT SERPL-MCNC: 6.2 G/DL
PROT SERPL-MCNC: 6.2 G/DL
PROT UR-MCNC: 13 MG/DL
PROTEIN URINE: NORMAL
PTH RELATED PROT SERPL-MCNC: <2 PMOL/L
RBC # BLD: 4.42 M/UL
RBC # FLD: 13.2 %
RED BLOOD CELLS URINE: 0 /HPF
SODIUM SERPL-SCNC: 135 MMOL/L
SPECIFIC GRAVITY URINE: 1.01
SQUAMOUS EPITHELIAL CELLS: 0 /HPF
T PALLIDUM AB SER QL IA: NEGATIVE
TIBC SERPL-MCNC: 268 UG/DL
TSH SERPL-ACNC: 1.38 UIU/ML
UIBC SERPL-MCNC: 233 UG/DL
UROBILINOGEN URINE: NORMAL
WBC # FLD AUTO: 7.5 K/UL
WHITE BLOOD CELLS URINE: 0 /HPF

## 2023-04-04 ENCOUNTER — APPOINTMENT (OUTPATIENT)
Dept: ENDOCRINOLOGY | Facility: CLINIC | Age: 78
End: 2023-04-04
Payer: MEDICARE

## 2023-04-04 PROCEDURE — G0108 DIAB MANAGE TRN  PER INDIV: CPT | Mod: GA

## 2023-04-12 ENCOUNTER — OUTPATIENT (OUTPATIENT)
Dept: OUTPATIENT SERVICES | Facility: HOSPITAL | Age: 78
LOS: 1 days | End: 2023-04-12
Payer: MEDICARE

## 2023-04-12 ENCOUNTER — APPOINTMENT (OUTPATIENT)
Dept: ULTRASOUND IMAGING | Facility: IMAGING CENTER | Age: 78
End: 2023-04-12
Payer: MEDICARE

## 2023-04-12 DIAGNOSIS — Z90.5 ACQUIRED ABSENCE OF KIDNEY: Chronic | ICD-10-CM

## 2023-04-12 DIAGNOSIS — N28.9 DISORDER OF KIDNEY AND URETER, UNSPECIFIED: ICD-10-CM

## 2023-04-12 PROCEDURE — 76770 US EXAM ABDO BACK WALL COMP: CPT | Mod: 26

## 2023-04-12 PROCEDURE — 76770 US EXAM ABDO BACK WALL COMP: CPT

## 2023-04-19 ENCOUNTER — RX RENEWAL (OUTPATIENT)
Age: 78
End: 2023-04-19

## 2023-04-20 ENCOUNTER — APPOINTMENT (OUTPATIENT)
Dept: INTERNAL MEDICINE | Facility: CLINIC | Age: 78
End: 2023-04-20
Payer: MEDICARE

## 2023-04-20 VITALS
OXYGEN SATURATION: 96 % | DIASTOLIC BLOOD PRESSURE: 79 MMHG | HEIGHT: 65 IN | SYSTOLIC BLOOD PRESSURE: 121 MMHG | HEART RATE: 83 BPM | WEIGHT: 164 LBS | BODY MASS INDEX: 27.32 KG/M2

## 2023-04-20 PROCEDURE — 99214 OFFICE O/P EST MOD 30 MIN: CPT

## 2023-04-20 NOTE — REVIEW OF SYSTEMS
[Constipation] : constipation [Memory Loss] : memory loss [Negative] : Heme/Lymph [Abdominal Pain] : no abdominal pain [Nausea] : no nausea [Vomiting] : no vomiting [Heartburn] : no heartburn [Headache] : no headache [Dizziness] : no dizziness [Unsteady Walk] : no ataxia

## 2023-04-20 NOTE — PLAN
[FreeTextEntry1] : Mr. SHERWIN MINOR 76 year male with a PMH HTN, uncontrolled DM2, CRI, glaucoma, macular degeneration, non-proliferative diabetic retinopathy (receive Avastin injection in left eye), has problem with memory, h/o renal cell carcinoma, h/o TIA present to the office for a f/u\par Result of the blood test from 03/08/23 was reviewed with patient and patient's wife\par  DM2, recommend strict low carb diet, be compliant with DM2 meds, continue humalog, lantus,trulicityf/u with endocrinologist\par Has an elevated creatinine level, stable, (avoid nsaid's) f/u with nephrologist\par TSH, vit D level is NL\par HTN is well controlled  continue present meds norvasc, metoprolol, asa\par Hyperlipidemia  continue lipitor 80 mg qhs.\par BPH finasteride, f/u with urologist\par Glaucoma continue xalatan, f/u with ophtalmologist\par F/u with dermatologist for a skin exam\par Decrease memory, f/u with neurologist, recommend  to stop driving(works as a )\par For a constipation recommend: eat three meals every day, gradually increase the amount of high fiber foods in your diet. Choose whole grain breads, cereal and rice, eat prunes select raw fruits and vegetable, drink plenty of fluids(drink warm liquids in the morning). Take stool softener. If symptoms will persist RTC.\par RTC to f/u in 3 mo

## 2023-04-20 NOTE — HISTORY OF PRESENT ILLNESS
[Spouse] : spouse [FreeTextEntry1] : F/u exam [de-identified] : C. Patient recently was seen by nephrologist, did blood test, renal u/s(was recommended to f/u with urologist). Was seen by endocrinologist. Patient is currently feels OK, denies complaints except decrease memory

## 2023-05-02 ENCOUNTER — RX RENEWAL (OUTPATIENT)
Age: 78
End: 2023-05-02

## 2023-05-04 ENCOUNTER — RX RENEWAL (OUTPATIENT)
Age: 78
End: 2023-05-04

## 2023-05-04 ENCOUNTER — NON-APPOINTMENT (OUTPATIENT)
Age: 78
End: 2023-05-04

## 2023-05-06 DIAGNOSIS — R21 RASH AND OTHER NONSPECIFIC SKIN ERUPTION: ICD-10-CM

## 2023-05-11 ENCOUNTER — APPOINTMENT (OUTPATIENT)
Dept: UROLOGY | Facility: CLINIC | Age: 78
End: 2023-05-11
Payer: MEDICARE

## 2023-05-11 ENCOUNTER — LABORATORY RESULT (OUTPATIENT)
Age: 78
End: 2023-05-11

## 2023-05-11 VITALS
OXYGEN SATURATION: 97 % | DIASTOLIC BLOOD PRESSURE: 71 MMHG | TEMPERATURE: 97.2 F | HEART RATE: 74 BPM | BODY MASS INDEX: 27.66 KG/M2 | SYSTOLIC BLOOD PRESSURE: 134 MMHG | HEIGHT: 65 IN | WEIGHT: 166 LBS | RESPIRATION RATE: 17 BRPM

## 2023-05-11 DIAGNOSIS — N47.6 BALANOPOSTHITIS: ICD-10-CM

## 2023-05-11 DIAGNOSIS — J45.909 UNSPECIFIED ASTHMA, UNCOMPLICATED: ICD-10-CM

## 2023-05-11 LAB
BILIRUB UR QL STRIP: NORMAL
CLARITY UR: CLEAR
COLLECTION METHOD: NORMAL
GLUCOSE UR-MCNC: NORMAL
HCG UR QL: 0.2 EU/DL
HGB UR QL STRIP.AUTO: NORMAL
KETONES UR-MCNC: NORMAL
LEUKOCYTE ESTERASE UR QL STRIP: NORMAL
NITRITE UR QL STRIP: NORMAL
PH UR STRIP: 6
PROT UR STRIP-MCNC: 30
SP GR UR STRIP: 1.01

## 2023-05-11 PROCEDURE — 51798 US URINE CAPACITY MEASURE: CPT

## 2023-05-11 PROCEDURE — 99214 OFFICE O/P EST MOD 30 MIN: CPT

## 2023-05-11 PROCEDURE — 51741 ELECTRO-UROFLOWMETRY FIRST: CPT

## 2023-05-11 PROCEDURE — 81003 URINALYSIS AUTO W/O SCOPE: CPT | Mod: QW

## 2023-05-11 RX ORDER — ASPIRIN 81 MG/1
81 TABLET, COATED ORAL
Qty: 30 | Refills: 3 | Status: DISCONTINUED | COMMUNITY
Start: 2022-08-22 | End: 2023-05-11

## 2023-05-11 RX ORDER — ATENOLOL 25 MG/1
25 TABLET ORAL
Refills: 0 | Status: DISCONTINUED | COMMUNITY
End: 2023-05-11

## 2023-05-11 RX ORDER — BLOOD SUGAR DIAGNOSTIC
STRIP MISCELLANEOUS
Qty: 200 | Refills: 0 | Status: DISCONTINUED | COMMUNITY
Start: 2021-06-01 | End: 2023-05-11

## 2023-05-11 RX ORDER — DULAGLUTIDE 0.75 MG/.5ML
0.75 INJECTION, SOLUTION SUBCUTANEOUS
Qty: 4 | Refills: 3 | Status: DISCONTINUED | COMMUNITY
Start: 2022-11-30 | End: 2023-05-11

## 2023-05-11 RX ORDER — INSULIN LISPRO 100 [IU]/ML
100 INJECTION, SOLUTION INTRAVENOUS; SUBCUTANEOUS
Qty: 3 | Refills: 3 | Status: DISCONTINUED | COMMUNITY
Start: 2019-11-08 | End: 2023-05-11

## 2023-05-11 RX ORDER — BLOOD-GLUCOSE METER
W/DEVICE EACH MISCELLANEOUS
Qty: 1 | Refills: 0 | Status: DISCONTINUED | COMMUNITY
Start: 2021-06-01 | End: 2023-05-11

## 2023-05-11 RX ORDER — ASPIRIN 81 MG
81 TABLET, DELAYED RELEASE (ENTERIC COATED) ORAL
Refills: 0 | Status: DISCONTINUED | COMMUNITY
End: 2023-05-11

## 2023-05-11 RX ORDER — PEN NEEDLE, DIABETIC 29 G X1/2"
32G X 4 MM NEEDLE, DISPOSABLE MISCELLANEOUS
Qty: 400 | Refills: 3 | Status: DISCONTINUED | COMMUNITY
Start: 2019-11-08 | End: 2023-05-11

## 2023-05-11 RX ORDER — BLOOD-GLUCOSE CONTROL, NORMAL
EACH MISCELLANEOUS
Qty: 1 | Refills: 0 | Status: DISCONTINUED | COMMUNITY
Start: 2021-06-01 | End: 2023-05-11

## 2023-05-11 NOTE — HISTORY OF PRESENT ILLNESS
[FreeTextEntry1] : 07/07/2021: Mr. Gomez is a 74 y/o M presenting today for initial evaluation of urinary tract infection. Daytime frequency x 5. N x 1. No fever, but sometimes feels mild chills. Admits sometimes feeling mild burning. Finished taking Cephalexin 500 mg PO BID, taking some other antibiotics , should finish in a day or two. Has h/o diabetes, kidney stones, renal insufficiency. \par \par PVR: 171 cc after two hours. \par \par O/E: uncircumcised penis. balanitis. inflamed foreskin. \par \par Balanitis: Practice foreskin hygiene. We will start Nystatin skin ointment. \par \par RTO in 1 month for PVR and uroflow. \par \par 08/16/2021: Mr. GOMEZ is a 75 year male who presents today for a follow up for solitary kidney. He is doing well. N x 1-2. Daytime frequency x 3-4. He denies hematuria, dysuria, urgency and hesitancy. \par \par Labs on 07/08/2021: BUN: 18 mg/dL; Creatinine: 1.68 mg/dl; UA: proteinuria, glycosuria; Culture: negative\par Uroflow not done as pt could not void; PVR at random 87 cc\par \par O/E: balanitis much improved; uncircumcised phallus, adequate meatus, both testes descended, nontender, no mass palpable\par BLAKE: deferred\par \par On Tamsulosin and Finasteride; will continue \par \par Will get PSA and BMP \par Follow up in 3 months to check PSA, BMP, uroflow and bladder scan\par \par \par \par 10/20/2022: Mr. GOMEZ is a 76 year male who presents today for  urgency, wearing a pad for about 2 months has h/o of Enlarged prostate with LUTS \par No burning, N x 2, D q 2-3 hrs, flow is good, no PVR. Has urgency. Urinary stream is good. Voided volume : 98 mL   PVR: 5. 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 GFR is 34 ml/min. \par \par \par O/E normal genitalia.\par \par Suggest; UA and Culture. \par RTC: 6 months Follow up : UA, Culture, Uro -flow, and PVR\par \par \par \par \par \par 05/11/2023: Mr. GOMEZ is a 77 year male who presents today for Increased frequency, urgency and burning on urination for last 2-3 weeks. Using diapers. Has h/o Enlarged prostate with LUTS : On Tamsulosin and Finasteride. \par PVR: 19 cc.  He denies hematuria and hesitancy.  POCT done in office. Trace of Leukocytes were shown. Urine sent for C/S. Prescribed  Ceftin for one week  and will f/u in 2 weeks to reevaluate. \par \par \par RTC: 2 weeks to review lab results \par \par \par

## 2023-05-11 NOTE — PHYSICAL EXAM
[General Appearance - Well Developed] : well developed [General Appearance - Well Nourished] : well nourished [Normal Appearance] : normal appearance [Well Groomed] : well groomed [General Appearance - In No Acute Distress] : no acute distress [Abdomen Soft] : soft [Abdomen Tenderness] : non-tender [Costovertebral Angle Tenderness] : no ~M costovertebral angle tenderness [Urethral Meatus] : meatus normal [Penis Abnormality] : normal uncircumcised penis [Urinary Bladder Findings] : the bladder was normal on palpation [Scrotum] : the scrotum was normal [Rectal Exam - Seminal Vesicles] : the seminal vesicles were normal [Epididymis] : the epididymides were normal [Testes Tenderness] : no tenderness of the testes [Testes Mass (___cm)] : there were no testicular masses [Edema] : no peripheral edema [] : no respiratory distress [Respiration, Rhythm And Depth] : normal respiratory rhythm and effort [Exaggerated Use Of Accessory Muscles For Inspiration] : no accessory muscle use [Oriented To Time, Place, And Person] : oriented to person, place, and time [Affect] : the affect was normal [Mood] : the mood was normal [Not Anxious] : not anxious [Normal Station and Gait] : the gait and station were normal for the patient's age [No Focal Deficits] : no focal deficits [No Palpable Adenopathy] : no palpable adenopathy [FreeTextEntry1] : balanoposthitis; much improved; BALKE: deferred

## 2023-05-12 ENCOUNTER — APPOINTMENT (OUTPATIENT)
Dept: UROLOGY | Facility: CLINIC | Age: 78
End: 2023-05-12

## 2023-05-12 ENCOUNTER — RX RENEWAL (OUTPATIENT)
Age: 78
End: 2023-05-12

## 2023-05-15 LAB
APPEARANCE: CLEAR
BILIRUBIN URINE: NEGATIVE
BLOOD URINE: NEGATIVE
COLOR: YELLOW
GLUCOSE QUALITATIVE U: NEGATIVE MG/DL
KETONES URINE: NEGATIVE MG/DL
LEUKOCYTE ESTERASE URINE: ABNORMAL
NITRITE URINE: NEGATIVE
PH URINE: 6.5
PROTEIN URINE: 30 MG/DL
PSA SERPL-MCNC: 9.65 NG/ML
SPECIFIC GRAVITY URINE: 1.01
UROBILINOGEN URINE: 0.2 MG/DL

## 2023-05-23 ENCOUNTER — APPOINTMENT (OUTPATIENT)
Dept: ENDOCRINOLOGY | Facility: CLINIC | Age: 78
End: 2023-05-23

## 2023-05-24 RX ORDER — ASPIRIN ENTERIC COATED TABLETS 81 MG 81 MG/1
81 TABLET, DELAYED RELEASE ORAL
Qty: 30 | Refills: 2 | Status: ACTIVE | COMMUNITY
Start: 2022-04-29 | End: 1900-01-01

## 2023-05-30 ENCOUNTER — RX RENEWAL (OUTPATIENT)
Age: 78
End: 2023-05-30

## 2023-05-30 ENCOUNTER — LABORATORY RESULT (OUTPATIENT)
Age: 78
End: 2023-05-30

## 2023-05-30 ENCOUNTER — APPOINTMENT (OUTPATIENT)
Dept: UROLOGY | Facility: CLINIC | Age: 78
End: 2023-05-30
Payer: MEDICARE

## 2023-05-30 VITALS
SYSTOLIC BLOOD PRESSURE: 132 MMHG | DIASTOLIC BLOOD PRESSURE: 81 MMHG | HEART RATE: 86 BPM | OXYGEN SATURATION: 94 % | TEMPERATURE: 97.8 F

## 2023-05-30 PROCEDURE — 99213 OFFICE O/P EST LOW 20 MIN: CPT

## 2023-05-30 PROCEDURE — 51798 US URINE CAPACITY MEASURE: CPT

## 2023-05-30 RX ORDER — ERGOCALCIFEROL 1.25 MG/1
1.25 MG CAPSULE ORAL
Qty: 13 | Refills: 1 | Status: DISCONTINUED | COMMUNITY
Start: 2021-11-11 | End: 2023-05-30

## 2023-05-30 RX ORDER — INSULIN GLARGINE 100 [IU]/ML
100 INJECTION, SOLUTION SUBCUTANEOUS
Qty: 3 | Refills: 3 | Status: DISCONTINUED | COMMUNITY
Start: 2019-11-08 | End: 2023-05-30

## 2023-05-30 RX ORDER — GLUCAGON 3 MG/1
3 POWDER NASAL
Qty: 1 | Refills: 2 | Status: DISCONTINUED | COMMUNITY
Start: 2020-10-26 | End: 2023-05-30

## 2023-05-30 RX ORDER — GLUCAGON INJECTION, SOLUTION 1 MG/.2ML
1 INJECTION, SOLUTION SUBCUTANEOUS
Qty: 1 | Refills: 3 | Status: DISCONTINUED | COMMUNITY
Start: 2022-11-30 | End: 2023-05-30

## 2023-05-30 RX ORDER — ALBUTEROL SULFATE 90 UG/1
108 (90 BASE) INHALANT RESPIRATORY (INHALATION)
Refills: 0 | Status: DISCONTINUED | COMMUNITY
Start: 2021-06-13 | End: 2023-05-30

## 2023-05-30 RX ORDER — ERGOCALCIFEROL 1.25 MG/1
1.25 MG CAPSULE, LIQUID FILLED ORAL
Qty: 12 | Refills: 2 | Status: DISCONTINUED | COMMUNITY
Start: 2022-04-25 | End: 2023-05-30

## 2023-05-30 RX ORDER — SITAGLIPTIN 25 MG/1
25 TABLET, FILM COATED ORAL DAILY
Qty: 1 | Refills: 6 | Status: DISCONTINUED | COMMUNITY
Start: 2020-10-26 | End: 2023-05-30

## 2023-05-30 NOTE — HISTORY OF PRESENT ILLNESS
[FreeTextEntry1] : 07/07/2021: Mr. Gomez is a 74 y/o M presenting today for initial evaluation of urinary tract infection. Daytime frequency x 5. N x 1. No fever, but sometimes feels mild chills. Admits sometimes feeling mild burning. Finished taking Cephalexin 500 mg PO BID, taking some other antibiotics , should finish in a day or two. Has h/o diabetes, kidney stones, renal insufficiency. \par \par PVR: 171 cc after two hours. \par \par O/E: uncircumcised penis. balanitis. inflamed foreskin. \par \par Balanitis: Practice foreskin hygiene. We will start Nystatin skin ointment. \par \par RTO in 1 month for PVR and uroflow. \par \par 08/16/2021: Mr. GOMEZ is a 75 year male who presents today for a follow up for solitary kidney. He is doing well. N x 1-2. Daytime frequency x 3-4. He denies hematuria, dysuria, urgency and hesitancy. \par \par Labs on 07/08/2021: BUN: 18 mg/dL; Creatinine: 1.68 mg/dl; UA: proteinuria, glycosuria; Culture: negative\par Uroflow not done as pt could not void; PVR at random 87 cc\par \par O/E: balanitis much improved; uncircumcised phallus, adequate meatus, both testes descended, nontender, no mass palpable\par BLAKE: deferred\par \par On Tamsulosin and Finasteride; will continue \par \par Will get PSA and BMP \par Follow up in 3 months to check PSA, BMP, uroflow and bladder scan\par \par \par \par 10/20/2022: Mr. GOMEZ is a 76 year male who presents today for  urgency, wearing a pad for about 2 months has h/o of Enlarged prostate with LUTS \par No burning, N x 2, D q 2-3 hrs, flow is good, no PVR. Has urgency. Urinary stream is good. Voided volume : 98 mL   PVR: 5. 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 GFR is 34 ml/min. \par \par \par O/E normal genitalia.\par \par Suggest; UA and Culture. \par RTC: 6 months Follow up : UA, Culture, Uro -flow, and PVR\par \par \par \par \par \par 05/11/2023: Mr. GOMEZ is a 77 year male who presents today for Increased frequency, urgency and burning on urination for last 2-3 weeks. Using diapers. Has h/o Enlarged prostate with LUTS : On Tamsulosin and Finasteride. \par PVR: 19 cc.  He denies hematuria and hesitancy.  POCT done in office. Trace of Leukocytes were shown. Urine sent for C/S. Prescribed  Ceftin for one week  and will f/u in 2 weeks to reevaluate. \par \par \par RTC: 2 weeks to review lab results \par \par \par \par \par \par 05/30/2023: Mr. GOMEZ is a 77 year male who presents today for a follow up for UTI and frequent urination \par \par Still experiencing frequent urination. Much improved , was voiding q 30 min , now 90 min.  No burning. Refilled ABX today for 2 weeks. Urine culture was positive.\par \par On Finasteride. Was not able to urinate, urinated before coming in.  Pt. will drop off urine later for Urinalysis and Culture. Post 1 hr void : PVR: 30 cc.  Doing well on Finasteride. Will continue \par \par PSA: \par 08/16/2021: 1.72 ng/mL \par 12/13/2022: 1.56 ng/mL \par 05/11/2023: 9.65 ng/mL ( had UTI) \par \par RTC: 1 month for Follow up : UA, Culture, Uro -flow, and PVR \par

## 2023-05-31 LAB
APPEARANCE: CLEAR
BILIRUBIN URINE: NEGATIVE
BLOOD URINE: NEGATIVE
COLOR: YELLOW
GLUCOSE QUALITATIVE U: NEGATIVE MG/DL
KETONES URINE: NEGATIVE MG/DL
LEUKOCYTE ESTERASE URINE: NEGATIVE
NITRITE URINE: NEGATIVE
PH URINE: 5.5
PROTEIN URINE: 100 MG/DL
SPECIFIC GRAVITY URINE: 1.02
UROBILINOGEN URINE: 0.2 MG/DL

## 2023-06-05 RX ORDER — BLOOD-GLUCOSE METER
70 EACH MISCELLANEOUS
Qty: 1 | Refills: 3 | Status: ACTIVE | COMMUNITY
Start: 2023-06-05 | End: 1900-01-01

## 2023-06-13 ENCOUNTER — APPOINTMENT (OUTPATIENT)
Dept: ENDOCRINOLOGY | Facility: CLINIC | Age: 78
End: 2023-06-13
Payer: MEDICARE

## 2023-06-13 PROCEDURE — 82962 GLUCOSE BLOOD TEST: CPT

## 2023-06-13 PROCEDURE — G0108 DIAB MANAGE TRN  PER INDIV: CPT | Mod: GA

## 2023-06-13 RX ORDER — LANCETS 33 GAUGE
EACH MISCELLANEOUS
Qty: 1 | Refills: 3 | Status: ACTIVE | COMMUNITY
Start: 2023-06-13 | End: 1900-01-01

## 2023-06-13 RX ORDER — BLOOD SUGAR DIAGNOSTIC
STRIP MISCELLANEOUS
Qty: 4 | Refills: 0 | Status: ACTIVE | COMMUNITY
Start: 2023-06-13 | End: 1900-01-01

## 2023-06-16 LAB — GLUCOSE BLDC GLUCOMTR-MCNC: 313

## 2023-06-20 ENCOUNTER — OFFICE (OUTPATIENT)
Facility: LOCATION | Age: 78
Setting detail: OPHTHALMOLOGY
End: 2023-06-20
Payer: MEDICARE

## 2023-06-20 DIAGNOSIS — E13.3291: ICD-10-CM

## 2023-06-20 DIAGNOSIS — H35.3131: ICD-10-CM

## 2023-06-20 DIAGNOSIS — H33.312: ICD-10-CM

## 2023-06-20 DIAGNOSIS — H35.372: ICD-10-CM

## 2023-06-20 DIAGNOSIS — E13.3312: ICD-10-CM

## 2023-06-20 PROCEDURE — 99213 OFFICE O/P EST LOW 20 MIN: CPT | Performed by: OPHTHALMOLOGY

## 2023-06-20 PROCEDURE — 92134 CPTRZ OPH DX IMG PST SGM RTA: CPT | Performed by: OPHTHALMOLOGY

## 2023-06-20 PROCEDURE — 67028 INJECTION EYE DRUG: CPT | Performed by: OPHTHALMOLOGY

## 2023-06-20 PROCEDURE — 92235 FLUORESCEIN ANGRPH MLTIFRAME: CPT | Performed by: OPHTHALMOLOGY

## 2023-06-20 ASSESSMENT — CONFRONTATIONAL VISUAL FIELD TEST (CVF)
OD_FINDINGS: FULL
OS_FINDINGS: FULL

## 2023-06-20 ASSESSMENT — VISUAL ACUITY
OD_BCVA: 20/50+2
OS_BCVA: 20/40-2

## 2023-06-26 RX ORDER — SERTRALINE 25 MG/1
25 TABLET, FILM COATED ORAL
Qty: 30 | Refills: 2 | Status: ACTIVE | COMMUNITY
Start: 2022-12-13 | End: 1900-01-01

## 2023-07-03 ENCOUNTER — RX RENEWAL (OUTPATIENT)
Age: 78
End: 2023-07-03

## 2023-07-12 ENCOUNTER — APPOINTMENT (OUTPATIENT)
Dept: NEPHROLOGY | Facility: CLINIC | Age: 78
End: 2023-07-12
Payer: MEDICARE

## 2023-07-12 VITALS
WEIGHT: 166.45 LBS | DIASTOLIC BLOOD PRESSURE: 69 MMHG | OXYGEN SATURATION: 95 % | TEMPERATURE: 97.1 F | HEART RATE: 74 BPM | BODY MASS INDEX: 27.73 KG/M2 | HEIGHT: 65 IN | SYSTOLIC BLOOD PRESSURE: 119 MMHG

## 2023-07-12 PROCEDURE — 99214 OFFICE O/P EST MOD 30 MIN: CPT | Mod: GC

## 2023-07-12 NOTE — PHYSICAL EXAM
[General Appearance - Alert] : alert [General Appearance - In No Acute Distress] : in no acute distress [General Appearance - Well Nourished] : well nourished [General Appearance - Well Developed] : well developed [General Appearance - Well-Appearing] : healthy appearing [Sclera] : the sclera and conjunctiva were normal [Extraocular Movements] : extraocular movements were intact [Neck Appearance] : the appearance of the neck was normal [Jugular Venous Distention Increased] : there was no jugular-venous distention [] : no respiratory distress [Respiration, Rhythm And Depth] : normal respiratory rhythm and effort [Exaggerated Use Of Accessory Muscles For Inspiration] : no accessory muscle use [Auscultation Breath Sounds / Voice Sounds] : lungs were clear to auscultation bilaterally [Heart Rate And Rhythm] : heart rate was normal and rhythm regular [Heart Sounds] : normal S1 and S2 [Heart Sounds Gallop] : no gallops [Murmurs] : no murmurs [Bowel Sounds] : normal bowel sounds [Abdomen Soft] : soft [Abdomen Tenderness] : non-tender [No CVA Tenderness] : no ~M costovertebral angle tenderness [No Spinal Tenderness] : no spinal tenderness [Abnormal Walk] : normal gait [No Focal Deficits] : no focal deficits [Oriented To Time, Place, And Person] : oriented to person, place, and time [Impaired Insight] : insight and judgment were intact [Affect] : the affect was normal [Mood] : the mood was normal [Outer Ear] : the ears and nose were normal in appearance [FreeTextEntry1] : +1 pitting edema up to mid shin b/l

## 2023-07-12 NOTE — HISTORY OF PRESENT ILLNESS
[FreeTextEntry1] : HPI:  76 year male with a PMH HTN x 10 yrs, uncontrolled DM2 x 30 years (diabetic retinopathy, neuropathy), RCC s/p nephrectomy, BPH, seizure, hypercalcemia/primary hyperpth followed by endocrine, glaucoma, h/o TIA her for a routine follow up visit of his elevated creatinine\par \par No hospitalizations or serious illnesses since last visit. \par Since last visit, complains of a persistent cough for past few month, has not seen his PMD. \par Follows with a urologist and needed treatment for UTI in May\par Patient does not check his blood sugars consistently at home. Last finger stick was 184 at home. Does not check his BP at home. \par Patient does notice intermittent bubbly urine. \par Reports some frequent voiding and hesitancy and sensation of incomplete emptying.  Last saw urology >1 year ago.  sono recently with enlarged prostate.\par ROS: No headaches, dizziness, chest pain, dyspnea, leg swelling,  cough, fever, abdominal pain, N/V, anorexia, pruritus, dysuria, change in frequency or amount of urination, hematuria. \par \par Endocrine: Dr. Gilbert\par PMD: Dr. Hernandez\par

## 2023-07-12 NOTE — REASON FOR VISIT
[Initial Evaluation] : an initial evaluation [Spouse] : spouse [FreeTextEntry1] : CKD here for follow up

## 2023-07-12 NOTE — ASSESSMENT
[FreeTextEntry1] : 77 year male with a PMH HTN x 10 yrs, uncontrolled DM2 x 30 years (diabetic retinopathy, neuropathy), RCC s/p nephrectomy, BPH, seizure, hypercalcemia, glaucoma, h/o TIA initially referred by  Dr. Gilbert (endocrinologist) for elevated SCr. Now here for a routine follow up visit. \par \par #CKD 3b  likely multifactorial 2/2 nephrectomy, DM nephropathy, HTN nephrosclerosis \par \par -Baseline SCr has been around 1.6-1.7-2.0 since  2019.\par -Last Scr 1.78 on 3/20/23\par -Urine A/Cr ratio is 80 (A2). UA with proteinuria, rest bland. Upr/cr of 0.2\par -Will repeat UA, UP/C ratio, renal panel\par -Asked to hydrate well\par -Discussed with patient about the importance of starting an ARB given his proteinuria, but in past has had hyperkalemia. Will decide based on labs today\par \par #HTN- controlled\par -Continue hydralazine 25 bid; atenolol 25, norvasc 10\par -Consider switch to arb if potassium ok on recent labwork. Will discontinue hydral\par \par #DM2- \par -Last A1c 7.6 (from 8.2)\par Continue current diabetic medication with A1c goal < 7\par -F/u with endocrine\par -Will benefit from SGLT2 inhibitor\par \par #RCC s/p nephrectomy\par -Renal US done, has a right nephrectomy.  enlarged prostate\par \par #BPH\par -no bladder retention on US kidney and bladder scan\par \par #hypercalcemia 2/2 primary HPTH -now resolved\par -Serum calcium has been elevated since 2022 ~10.6 - 10.7. iPTH was 145 suggesting primary HPTH since 2019. \par -Patient was asked to stop vitamin d supplementation. Last serum calcium of 10.3\par \par \par F/u in December\par \par

## 2023-07-14 RX ORDER — DAPAGLIFLOZIN 10 MG/1
10 TABLET, FILM COATED ORAL
Qty: 30 | Refills: 3 | Status: ACTIVE | COMMUNITY
Start: 2023-07-14 | End: 1900-01-01

## 2023-07-26 LAB
25(OH)D3 SERPL-MCNC: 24.7 NG/ML
ALBUMIN SERPL ELPH-MCNC: 3.9 G/DL
ANION GAP SERPL CALC-SCNC: 10 MMOL/L
APPEARANCE: CLEAR
BACTERIA: NEGATIVE /HPF
BILIRUBIN URINE: NEGATIVE
BLOOD URINE: NEGATIVE
BUN SERPL-MCNC: 25 MG/DL
CALCIUM SERPL-MCNC: 10.1 MG/DL
CALCIUM SERPL-MCNC: 9.9 MG/DL
CAST: 0 /LPF
CHLORIDE SERPL-SCNC: 103 MMOL/L
CO2 SERPL-SCNC: 21 MMOL/L
COLOR: YELLOW
CREAT SERPL-MCNC: 1.92 MG/DL
CREAT SPEC-SCNC: 148 MG/DL
CREAT/PROT UR: 0.4 RATIO
EGFR: 36 ML/MIN/1.73M2
EPITHELIAL CELLS: 1 /HPF
GLUCOSE QUALITATIVE U: 100 MG/DL
GLUCOSE SERPL-MCNC: 276 MG/DL
KETONES URINE: ABNORMAL MG/DL
LEUKOCYTE ESTERASE URINE: ABNORMAL
MICROSCOPIC-UA: NORMAL
NITRITE URINE: NEGATIVE
PARATHYROID HORMONE INTACT: 163 PG/ML
PH URINE: 6
PHOSPHATE SERPL-MCNC: 2.8 MG/DL
POTASSIUM SERPL-SCNC: 5.2 MMOL/L
PROT UR-MCNC: 53 MG/DL
PROTEIN URINE: 100 MG/DL
RED BLOOD CELLS URINE: 1 /HPF
SODIUM SERPL-SCNC: 134 MMOL/L
SPECIFIC GRAVITY URINE: 1.02
UROBILINOGEN URINE: 0.2 MG/DL
WHITE BLOOD CELLS URINE: 1 /HPF

## 2023-07-28 ENCOUNTER — RX RENEWAL (OUTPATIENT)
Age: 78
End: 2023-07-28

## 2023-08-16 ENCOUNTER — APPOINTMENT (OUTPATIENT)
Dept: UROLOGY | Facility: CLINIC | Age: 78
End: 2023-08-16
Payer: MEDICARE

## 2023-08-16 ENCOUNTER — LABORATORY RESULT (OUTPATIENT)
Age: 78
End: 2023-08-16

## 2023-08-16 VITALS
SYSTOLIC BLOOD PRESSURE: 142 MMHG | TEMPERATURE: 98.1 F | DIASTOLIC BLOOD PRESSURE: 79 MMHG | HEART RATE: 68 BPM | OXYGEN SATURATION: 98 %

## 2023-08-16 DIAGNOSIS — F32.A DEPRESSION, UNSPECIFIED: ICD-10-CM

## 2023-08-16 DIAGNOSIS — G40.909 EPILEPSY, UNSPECIFIED, NOT INTRACTABLE, W/OUT STATUS EPILEPTICUS: ICD-10-CM

## 2023-08-16 DIAGNOSIS — R35.0 FREQUENCY OF MICTURITION: ICD-10-CM

## 2023-08-16 DIAGNOSIS — K59.00 CONSTIPATION, UNSPECIFIED: ICD-10-CM

## 2023-08-16 DIAGNOSIS — T78.40XA ALLERGY, UNSPECIFIED, INITIAL ENCOUNTER: ICD-10-CM

## 2023-08-16 PROCEDURE — 99214 OFFICE O/P EST MOD 30 MIN: CPT

## 2023-08-16 PROCEDURE — 51798 US URINE CAPACITY MEASURE: CPT

## 2023-08-16 PROCEDURE — 51741 ELECTRO-UROFLOWMETRY FIRST: CPT

## 2023-08-16 RX ORDER — NYSTATIN 100000 U/G
100000 OINTMENT TOPICAL
Qty: 15 | Refills: 2 | Status: DISCONTINUED | COMMUNITY
Start: 2022-07-26 | End: 2023-08-16

## 2023-08-16 RX ORDER — CEFUROXIME AXETIL 500 MG/1
500 TABLET ORAL
Qty: 28 | Refills: 1 | Status: DISCONTINUED | COMMUNITY
Start: 2023-05-11 | End: 2023-08-16

## 2023-08-16 RX ORDER — FINASTERIDE 5 MG/1
5 TABLET, FILM COATED ORAL
Qty: 30 | Refills: 0 | Status: DISCONTINUED | COMMUNITY
Start: 2021-06-29 | End: 2023-08-16

## 2023-08-16 NOTE — HISTORY OF PRESENT ILLNESS
[FreeTextEntry1] : 07/07/2021: Mr. Gomez is a 74 y/o M presenting today for initial evaluation of urinary tract infection. Daytime frequency x 5. N x 1. No fever, but sometimes feels mild chills. Admits sometimes feeling mild burning. Finished taking Cephalexin 500 mg PO BID, taking some other antibiotics , should finish in a day or two. Has h/o diabetes, kidney stones, renal insufficiency.   PVR: 171 cc after two hours.   O/E: uncircumcised penis. balanitis. inflamed foreskin.   Balanitis: Practice foreskin hygiene. We will start Nystatin skin ointment.   RTO in 1 month for PVR and uroflow.   08/16/2021: Mr. GOMEZ is a 75 year male who presents today for a follow up for solitary kidney. He is doing well. N x 1-2. Daytime frequency x 3-4. He denies hematuria, dysuria, urgency and hesitancy.   Labs on 07/08/2021: BUN: 18 mg/dL; Creatinine: 1.68 mg/dl; UA: proteinuria, glycosuria; Culture: negative Uroflow not done as pt could not void; PVR at random 87 cc  O/E: balanitis much improved; uncircumcised phallus, adequate meatus, both testes descended, nontender, no mass palpable BLAKE: deferred  On Tamsulosin and Finasteride; will continue   Will get PSA and BMP  Follow up in 3 months to check PSA, BMP, uroflow and bladder scan    10/20/2022: Mr. GOMEZ is a 76 year male who presents today for  urgency, wearing a pad for about 2 months has h/o of Enlarged prostate with LUTS  No burning, N x 2, D q 2-3 hrs, flow is good, no PVR. Has urgency. Urinary stream is good. Voided volume : 98 mL   PVR: 5. Continue Tamsulosin and Finasteride.  Solitary acquired kidney. S/P  Right nephrectomy for ca 2017. GFR is 34 ml/min.    O/E normal genitalia.  Suggest; UA and Culture.  RTC: 6 months Follow up : UA, Culture, Uro -flow, and PVR      05/11/2023: Mr. GOMEZ is a 77 year male who presents today for Increased frequency, urgency and burning on urination for last 2-3 weeks. Using diapers. Has h/o Enlarged prostate with LUTS : On Tamsulosin and Finasteride.  PVR: 19 cc.  He denies hematuria and hesitancy.  POCT done in office. Trace of Leukocytes were shown. Urine sent for C/S. Prescribed  Ceftin for one week  and will f/u in 2 weeks to reevaluate.    RTC: 2 weeks to review lab results       05/30/2023: Mr. GOMEZ is a 77 year male who presents today for a follow up for UTI and frequent urination   Still experiencing frequent urination. Much improved , was voiding q 30 min , now 90 min.  No burning. Refilled ABX today for 2 weeks. Urine culture was positive.  On Finasteride. Was not able to urinate, urinated before coming in.  Pt. will drop off urine later for Urinalysis and Culture. Post 1 hr void : PVR: 30 cc.  Doing well on Finasteride. Will continue   PSA:  08/16/2021: 1.72 ng/mL  12/13/2022: 1.56 ng/mL  05/11/2023: 9.65 ng/mL ( had UTI)   RTC: 1 month for Follow up : UA, Culture, Uro -flow, and PVR     08/16/2023: Mr. GOMEZ is a 77 year male who presents today for a follow up for Enlarged prostate with LUTS   Patient is voiding and emptying bladder well. PVR: 9 cc  Stopped taking Finasteride 6 months ago. Still voiding well.  No longer needed.  On Tamsulosin: Renewed today  Doing well, and will continue   Denies hematuria, dysuria, urgency and hesitancy.  Pt. is wearing depends due to occasional leakage.    Elevated PSA: 08/16/21: 1.72 ng/mL 12/13/22: 1.56 ng/mL 05/11/23: 9.65 ng/mL (had UTI), most likely secondary to UTI. Treated with antibiotics. Will repeat UA, culture and PSA today.  PSA drawn today.   O/E: uncircumcised phallus, adequate meatus, both testes descended, nontender, no mass palpable.   RTC: 3 months for:  UA, Culture, Uro -flow, and PVR and to review psa, ua/cx results

## 2023-08-16 NOTE — LETTER BODY
[Dear  ___] : Dear  [unfilled], [Consult Letter:] : I had the pleasure of evaluating your patient, [unfilled]. [Please see my note below.] : Please see my note below. [Consult Closing:] : Thank you very much for allowing me to participate in the care of this patient.  If you have any questions, please do not hesitate to contact me. [Sincerely,] : Sincerely, [FreeTextEntry3] : Bandar Macias MD

## 2023-08-17 LAB
APPEARANCE: CLEAR
BILIRUBIN URINE: NEGATIVE
BLOOD URINE: NEGATIVE
COLOR: YELLOW
GLUCOSE QUALITATIVE U: NEGATIVE MG/DL
KETONES URINE: NEGATIVE MG/DL
LEUKOCYTE ESTERASE URINE: NEGATIVE
NITRITE URINE: NEGATIVE
PH URINE: 6.5
PROTEIN URINE: 100 MG/DL
PSA SERPL-MCNC: 3.2 NG/ML
SPECIFIC GRAVITY URINE: 1.01
UROBILINOGEN URINE: 0.2 MG/DL

## 2023-08-21 ENCOUNTER — OFFICE (OUTPATIENT)
Dept: URBAN - METROPOLITAN AREA CLINIC 32 | Facility: CLINIC | Age: 78
Setting detail: OPHTHALMOLOGY
End: 2023-08-21
Payer: MEDICARE

## 2023-08-21 DIAGNOSIS — E13.3291: ICD-10-CM

## 2023-08-21 DIAGNOSIS — E13.3312: ICD-10-CM

## 2023-08-21 DIAGNOSIS — H33.312: ICD-10-CM

## 2023-08-21 DIAGNOSIS — H35.3131: ICD-10-CM

## 2023-08-21 DIAGNOSIS — H35.372: ICD-10-CM

## 2023-08-21 PROCEDURE — 92134 CPTRZ OPH DX IMG PST SGM RTA: CPT | Performed by: OPHTHALMOLOGY

## 2023-08-21 PROCEDURE — 67028 INJECTION EYE DRUG: CPT | Performed by: OPHTHALMOLOGY

## 2023-08-21 ASSESSMENT — VISUAL ACUITY
OS_BCVA: 20/30-
OD_BCVA: 20/60-2

## 2023-08-21 ASSESSMENT — CONFRONTATIONAL VISUAL FIELD TEST (CVF)
OS_FINDINGS: FULL
OD_FINDINGS: FULL

## 2023-08-28 ENCOUNTER — OFFICE (OUTPATIENT)
Dept: URBAN - METROPOLITAN AREA CLINIC 32 | Facility: CLINIC | Age: 78
Setting detail: OPHTHALMOLOGY
End: 2023-08-28
Payer: MEDICARE

## 2023-08-28 DIAGNOSIS — E13.3312: ICD-10-CM

## 2023-08-28 DIAGNOSIS — E13.3291: ICD-10-CM

## 2023-08-28 PROCEDURE — 92134 CPTRZ OPH DX IMG PST SGM RTA: CPT | Performed by: OPHTHALMOLOGY

## 2023-08-28 PROCEDURE — 67210 TREATMENT OF RETINAL LESION: CPT | Performed by: OPHTHALMOLOGY

## 2023-08-28 ASSESSMENT — VISUAL ACUITY
OS_BCVA: 20/30+1
OD_BCVA: 20/40-2

## 2023-08-28 ASSESSMENT — CONFRONTATIONAL VISUAL FIELD TEST (CVF)
OS_FINDINGS: FULL
OD_FINDINGS: FULL

## 2023-09-05 ENCOUNTER — RX RENEWAL (OUTPATIENT)
Age: 78
End: 2023-09-05

## 2023-09-05 RX ORDER — DOCUSATE SODIUM 100 MG/1
100 CAPSULE, LIQUID FILLED ORAL
Qty: 60 | Refills: 3 | Status: ACTIVE | COMMUNITY
Start: 2020-08-30 | End: 1900-01-01

## 2023-09-20 ENCOUNTER — APPOINTMENT (OUTPATIENT)
Dept: ENDOCRINOLOGY | Facility: CLINIC | Age: 78
End: 2023-09-20
Payer: MEDICARE

## 2023-09-20 VITALS
RESPIRATION RATE: 16 BRPM | SYSTOLIC BLOOD PRESSURE: 112 MMHG | WEIGHT: 162.13 LBS | OXYGEN SATURATION: 99 % | BODY MASS INDEX: 27.01 KG/M2 | TEMPERATURE: 97.6 F | HEART RATE: 103 BPM | HEIGHT: 65 IN | DIASTOLIC BLOOD PRESSURE: 76 MMHG

## 2023-09-20 LAB — GLUCOSE BLDC GLUCOMTR-MCNC: 151

## 2023-09-20 PROCEDURE — 36415 COLL VENOUS BLD VENIPUNCTURE: CPT

## 2023-09-20 PROCEDURE — 99214 OFFICE O/P EST MOD 30 MIN: CPT | Mod: 25

## 2023-09-20 PROCEDURE — 95251 CONT GLUC MNTR ANALYSIS I&R: CPT

## 2023-09-20 PROCEDURE — 82962 GLUCOSE BLOOD TEST: CPT

## 2023-09-20 RX ORDER — GLUCAGON INJECTION, SOLUTION 1 MG/.2ML
1 INJECTION, SOLUTION SUBCUTANEOUS
Qty: 1 | Refills: 2 | Status: ACTIVE | COMMUNITY
Start: 2023-09-20 | End: 1900-01-01

## 2023-09-22 ENCOUNTER — RX RENEWAL (OUTPATIENT)
Age: 78
End: 2023-09-22

## 2023-09-22 LAB
25(OH)D3 SERPL-MCNC: 29.5 NG/ML
ALBUMIN SERPL ELPH-MCNC: 4.5 G/DL
ALP BLD-CCNC: 115 U/L
ALT SERPL-CCNC: 27 U/L
ANION GAP SERPL CALC-SCNC: 13 MMOL/L
AST SERPL-CCNC: 21 U/L
BILIRUB SERPL-MCNC: 0.4 MG/DL
BUN SERPL-MCNC: 27 MG/DL
CA-I SERPL-SCNC: 5.4 MG/DL
CALCIUM SERPL-MCNC: 10.4 MG/DL
CALCIUM SERPL-MCNC: 10.4 MG/DL
CHLORIDE SERPL-SCNC: 100 MMOL/L
CHOLEST SERPL-MCNC: 144 MG/DL
CO2 SERPL-SCNC: 23 MMOL/L
CREAT SERPL-MCNC: 1.77 MG/DL
CREAT SPEC-SCNC: 85 MG/DL
EGFR: 39 ML/MIN/1.73M2
ESTIMATED AVERAGE GLUCOSE: 189 MG/DL
GLUCOSE SERPL-MCNC: 165 MG/DL
HBA1C MFR BLD HPLC: 8.2 %
HDLC SERPL-MCNC: 38 MG/DL
LDLC SERPL CALC-MCNC: 88 MG/DL
MICROALBUMIN 24H UR DL<=1MG/L-MCNC: 43.9 MG/DL
MICROALBUMIN/CREAT 24H UR-RTO: 516 MG/G
NONHDLC SERPL-MCNC: 105 MG/DL
PARATHYROID HORMONE INTACT: 162 PG/ML
POTASSIUM SERPL-SCNC: 4.7 MMOL/L
PROT SERPL-MCNC: 7.1 G/DL
SODIUM SERPL-SCNC: 136 MMOL/L
T4 FREE SERPL-MCNC: 1.8 NG/DL
TRIGL SERPL-MCNC: 92 MG/DL
TSH SERPL-ACNC: 1.53 UIU/ML

## 2023-09-26 ENCOUNTER — OFFICE (OUTPATIENT)
Dept: URBAN - METROPOLITAN AREA CLINIC 32 | Facility: CLINIC | Age: 78
Setting detail: OPHTHALMOLOGY
End: 2023-09-26
Payer: MEDICARE

## 2023-09-26 DIAGNOSIS — H35.372: ICD-10-CM

## 2023-09-26 DIAGNOSIS — E13.3312: ICD-10-CM

## 2023-09-26 DIAGNOSIS — H33.312: ICD-10-CM

## 2023-09-26 DIAGNOSIS — E13.3291: ICD-10-CM

## 2023-09-26 DIAGNOSIS — H35.3131: ICD-10-CM

## 2023-09-26 PROCEDURE — 92134 CPTRZ OPH DX IMG PST SGM RTA: CPT | Performed by: OPHTHALMOLOGY

## 2023-09-26 PROCEDURE — 67028 INJECTION EYE DRUG: CPT | Performed by: OPHTHALMOLOGY

## 2023-09-26 ASSESSMENT — CONFRONTATIONAL VISUAL FIELD TEST (CVF)
OS_FINDINGS: FULL
OD_FINDINGS: FULL

## 2023-09-26 ASSESSMENT — VISUAL ACUITY
OD_BCVA: 20/40-2
OS_BCVA: 20/30-1

## 2023-09-29 ENCOUNTER — RX RENEWAL (OUTPATIENT)
Age: 78
End: 2023-09-29

## 2023-10-11 ENCOUNTER — RX RENEWAL (OUTPATIENT)
Age: 78
End: 2023-10-11

## 2023-10-24 ENCOUNTER — OFFICE (OUTPATIENT)
Dept: URBAN - METROPOLITAN AREA CLINIC 32 | Facility: CLINIC | Age: 78
Setting detail: OPHTHALMOLOGY
End: 2023-10-24
Payer: MEDICARE

## 2023-10-24 DIAGNOSIS — H35.372: ICD-10-CM

## 2023-10-24 DIAGNOSIS — E13.3312: ICD-10-CM

## 2023-10-24 PROCEDURE — 67028 INJECTION EYE DRUG: CPT | Mod: 58,LT | Performed by: OPHTHALMOLOGY

## 2023-10-24 PROCEDURE — 92134 CPTRZ OPH DX IMG PST SGM RTA: CPT | Performed by: OPHTHALMOLOGY

## 2023-10-24 ASSESSMENT — CONFRONTATIONAL VISUAL FIELD TEST (CVF)
OD_FINDINGS: FULL
OS_FINDINGS: FULL

## 2023-10-24 ASSESSMENT — VISUAL ACUITY
OD_BCVA: 20/40
OS_BCVA: 20/40

## 2023-11-20 ENCOUNTER — RX RENEWAL (OUTPATIENT)
Age: 78
End: 2023-11-20

## 2023-11-20 RX ORDER — SODIUM BICARBONATE 650 MG/1
650 TABLET ORAL 3 TIMES DAILY
Qty: 90 | Refills: 3 | Status: ACTIVE | COMMUNITY
Start: 2023-07-14 | End: 1900-01-01

## 2023-12-05 ENCOUNTER — APPOINTMENT (OUTPATIENT)
Dept: INTERNAL MEDICINE | Facility: CLINIC | Age: 78
End: 2023-12-05
Payer: MEDICARE

## 2023-12-05 VITALS
DIASTOLIC BLOOD PRESSURE: 87 MMHG | OXYGEN SATURATION: 97 % | WEIGHT: 165 LBS | HEART RATE: 72 BPM | BODY MASS INDEX: 27.49 KG/M2 | HEIGHT: 65 IN | SYSTOLIC BLOOD PRESSURE: 156 MMHG

## 2023-12-05 DIAGNOSIS — R05.9 COUGH, UNSPECIFIED: ICD-10-CM

## 2023-12-05 PROCEDURE — 99214 OFFICE O/P EST MOD 30 MIN: CPT

## 2023-12-05 RX ORDER — BENZONATATE 100 MG/1
100 CAPSULE ORAL 3 TIMES DAILY
Qty: 30 | Refills: 0 | Status: ACTIVE | COMMUNITY
Start: 2023-12-05 | End: 1900-01-01

## 2023-12-06 ENCOUNTER — APPOINTMENT (OUTPATIENT)
Dept: NEPHROLOGY | Facility: CLINIC | Age: 78
End: 2023-12-06
Payer: MEDICARE

## 2023-12-06 VITALS
BODY MASS INDEX: 28.16 KG/M2 | TEMPERATURE: 97.6 F | WEIGHT: 169 LBS | HEIGHT: 65 IN | HEART RATE: 76 BPM | SYSTOLIC BLOOD PRESSURE: 136 MMHG | DIASTOLIC BLOOD PRESSURE: 72 MMHG | OXYGEN SATURATION: 95 %

## 2023-12-06 PROCEDURE — 99214 OFFICE O/P EST MOD 30 MIN: CPT | Mod: GC

## 2023-12-08 ENCOUNTER — NON-APPOINTMENT (OUTPATIENT)
Age: 78
End: 2023-12-08

## 2023-12-12 ENCOUNTER — OFFICE (OUTPATIENT)
Dept: URBAN - METROPOLITAN AREA CLINIC 32 | Facility: CLINIC | Age: 78
Setting detail: OPHTHALMOLOGY
End: 2023-12-12
Payer: MEDICARE

## 2023-12-12 DIAGNOSIS — H35.372: ICD-10-CM

## 2023-12-12 DIAGNOSIS — H35.3131: ICD-10-CM

## 2023-12-12 DIAGNOSIS — H33.312: ICD-10-CM

## 2023-12-12 DIAGNOSIS — E13.3312: ICD-10-CM

## 2023-12-12 DIAGNOSIS — E13.3291: ICD-10-CM

## 2023-12-12 PROCEDURE — 99213 OFFICE O/P EST LOW 20 MIN: CPT | Mod: 25 | Performed by: OPHTHALMOLOGY

## 2023-12-12 PROCEDURE — 67028 INJECTION EYE DRUG: CPT | Mod: LT | Performed by: OPHTHALMOLOGY

## 2023-12-12 PROCEDURE — 92235 FLUORESCEIN ANGRPH MLTIFRAME: CPT | Performed by: OPHTHALMOLOGY

## 2023-12-12 PROCEDURE — 92134 CPTRZ OPH DX IMG PST SGM RTA: CPT | Performed by: OPHTHALMOLOGY

## 2023-12-13 ENCOUNTER — APPOINTMENT (OUTPATIENT)
Dept: UROLOGY | Facility: CLINIC | Age: 78
End: 2023-12-13
Payer: MEDICARE

## 2023-12-13 VITALS
WEIGHT: 167 LBS | TEMPERATURE: 97.1 F | OXYGEN SATURATION: 95 % | BODY MASS INDEX: 27.79 KG/M2 | SYSTOLIC BLOOD PRESSURE: 118 MMHG | DIASTOLIC BLOOD PRESSURE: 80 MMHG | HEART RATE: 101 BPM

## 2023-12-13 DIAGNOSIS — E11.9 TYPE 2 DIABETES MELLITUS W/OUT COMPLICATIONS: ICD-10-CM

## 2023-12-13 DIAGNOSIS — Z90.5 ACQUIRED ABSENCE OF KIDNEY: ICD-10-CM

## 2023-12-13 PROCEDURE — 51798 US URINE CAPACITY MEASURE: CPT

## 2023-12-13 PROCEDURE — 99213 OFFICE O/P EST LOW 20 MIN: CPT

## 2023-12-13 NOTE — PHYSICAL EXAM
[General Appearance - Well Developed] : well developed [General Appearance - Well Nourished] : well nourished [Normal Appearance] : normal appearance [Well Groomed] : well groomed [General Appearance - In No Acute Distress] : no acute distress [Abdomen Soft] : soft [Abdomen Tenderness] : non-tender [Costovertebral Angle Tenderness] : no ~M costovertebral angle tenderness [Urethral Meatus] : meatus normal [Penis Abnormality] : normal uncircumcised penis [Urinary Bladder Findings] : the bladder was normal on palpation [Scrotum] : the scrotum was normal [Rectal Exam - Seminal Vesicles] : the seminal vesicles were normal [Epididymis] : the epididymides were normal [Testes Tenderness] : no tenderness of the testes [FreeTextEntry1] : balanoposthitis; much improved; BLAKE: deferred [Testes Mass (___cm)] : there were no testicular masses [Edema] : no peripheral edema [] : no respiratory distress [Respiration, Rhythm And Depth] : normal respiratory rhythm and effort [Exaggerated Use Of Accessory Muscles For Inspiration] : no accessory muscle use [Oriented To Time, Place, And Person] : oriented to person, place, and time [Affect] : the affect was normal [Mood] : the mood was normal [Not Anxious] : not anxious [Normal Station and Gait] : the gait and station were normal for the patient's age [No Focal Deficits] : no focal deficits [No Palpable Adenopathy] : no palpable adenopathy

## 2023-12-13 NOTE — HISTORY OF PRESENT ILLNESS
[FreeTextEntry1] : 07/07/2021: Mr. Gomez is a 74 y/o M presenting today for initial evaluation of urinary tract infection. Daytime frequency x 5. N x 1. No fever, but sometimes feels mild chills. Admits sometimes feeling mild burning. Finished taking Cephalexin 500 mg PO BID, taking some other antibiotics , should finish in a day or two. Has h/o diabetes, kidney stones, renal insufficiency.   PVR: 171 cc after two hours.   O/E: uncircumcised penis. balanitis. inflamed foreskin.   Balanitis: Practice foreskin hygiene. We will start Nystatin skin ointment.   RTO in 1 month for PVR and uroflow.   08/16/2021: Mr. GOMEZ is a 75 year male who presents today for a follow up for solitary kidney. He is doing well. N x 1-2. Daytime frequency x 3-4. He denies hematuria, dysuria, urgency and hesitancy.   Labs on 07/08/2021: BUN: 18 mg/dL; Creatinine: 1.68 mg/dl; UA: proteinuria, glycosuria; Culture: negative Uroflow not done as pt could not void; PVR at random 87 cc  O/E: balanitis much improved; uncircumcised phallus, adequate meatus, both testes descended, nontender, no mass palpable BLAKE: deferred  On Tamsulosin and Finasteride; will continue   Will get PSA and BMP  Follow up in 3 months to check PSA, BMP, uroflow and bladder scan    10/20/2022: Mr. GOMEZ is a 76 year male who presents today for  urgency, wearing a pad for about 2 months has h/o of Enlarged prostate with LUTS  No burning, N x 2, D q 2-3 hrs, flow is good, no PVR. Has urgency. Urinary stream is good. Voided volume : 98 mL   PVR: 5. Continue Tamsulosin and Finasteride.  Solitary acquired kidney. S/P  Right nephrectomy for ca 2017. GFR is 34 ml/min.    O/E normal genitalia.  Suggest; UA and Culture.  RTC: 6 months Follow up : UA, Culture, Uro -flow, and PVR      05/11/2023: Mr. GOMEZ is a 77 year male who presents today for Increased frequency, urgency and burning on urination for last 2-3 weeks. Using diapers. Has h/o Enlarged prostate with LUTS : On Tamsulosin and Finasteride.  PVR: 19 cc.  He denies hematuria and hesitancy.  POCT done in office. Trace of Leukocytes were shown. Urine sent for C/S. Prescribed  Ceftin for one week  and will f/u in 2 weeks to reevaluate.    RTC: 2 weeks to review lab results       05/30/2023: Mr. GOMEZ is a 77 year male who presents today for a follow up for UTI and frequent urination   Still experiencing frequent urination. Much improved , was voiding q 30 min , now 90 min.  No burning. Refilled ABX today for 2 weeks. Urine culture was positive.  On Finasteride. Was not able to urinate, urinated before coming in.  Pt. will drop off urine later for Urinalysis and Culture. Post 1 hr void : PVR: 30 cc.  Doing well on Finasteride. Will continue   PSA:  08/16/2021: 1.72 ng/mL  12/13/2022: 1.56 ng/mL  05/11/2023: 9.65 ng/mL ( had UTI)   RTC: 1 month for Follow up : UA, Culture, Uro -flow, and PVR     08/16/2023: Mr. GOMEZ is a 77 year male who presents today for a follow up for Enlarged prostate with LUTS   Patient is voiding and emptying bladder well. PVR: 9 cc  Stopped taking Finasteride 6 months ago. Still voiding well.  No longer needed.  On Tamsulosin: Renewed today  Doing well, and will continue   Denies hematuria, dysuria, urgency and hesitancy.  Pt. is wearing depends due to occasional leakage.    Elevated PSA: 08/16/21: 1.72 ng/mL 12/13/22: 1.56 ng/mL 05/11/23: 9.65 ng/mL (had UTI), most likely secondary to UTI. Treated with antibiotics. Will repeat UA, culture and PSA today.  PSA drawn today.   O/E: uncircumcised phallus, adequate meatus, both testes descended, nontender, no mass palpable.   RTC: 3 months for:  UA, Culture, Uro -flow, and PVR and to review psa, ua/cx results      12/13/2023:  Elevated PSA: 08/16/21: 1.72 ng/mL 12/13/22: 1.56 ng/mL 05/11/23: 9.65 ng/mL (had UTI), most likely secondary to UTI. Treated with antibiotics. Will repeat UA, culture and PSA today.  08/06/2023: 3.20 ng/ml PS Are drawn today.    LUT: on Tamsulosin, doing well. PVR 11 ml. .continue Tamsulosin   RTC: 6 months for Follow up: PSA, UA, culture, uroflow and bladder scan

## 2023-12-13 NOTE — REVIEW OF SYSTEMS
ENDOCRINE FOLLOW-UP    Chief Complaint   Patient presents with   • Diabetes     3 Month Follow Up      HISTORY OF PRESENT ILLNESS:  Phoebe Kimball is here for diabetes follow-up. She says that glucose readings are still high at times even when she does not eat. She is aware that last A1c increased from  8.1 to 8.6 percent. She generally feels well and does not have any other specific complaints. She has not had any hypoglycemia recently which required assistance.  She works 2 jobs and has a lot of work related stress.  Diabetes review insulin-dependent diabetes mellitus treated with insulin pump therapy she has been on insulin pump for the past 12 years. She has had hypoglycemia but no severe episodes.  Glucose testing using sensor testing glucose regularly glucose sensor insulin pump download reviewed.  Insulin Humalog via the insulin pump basal and bolus insulin doses reviewed.  Diabetes CDE Delma Green 2017 her insurance only covers this once a year.  Diet she is aware of insulin carbohydrate ratio using bolus wizard.    REVIEW OF SYSTEMS:  Constitutional review stable weight since 2019 endocrine visit, prior weight gain of 1.9 kg in the past 3 1/2 months and 3 kg in 6 months on chart review.  Eyes she wears glasses. Last Diabetic Eye Exam: 2017 no diabetic retinopathy noted by Dr. Herman.  Skin she has noticed changes in the neck crease posteriorly.  Cardiovascular/respiratory/GI/neurological/psychological no complaints.  GYN  2 para 2 she is sexually active with her  currently not on any birth control. Menstrual cycles are heavy has been advised endometrial ablation by Dr. Persaud.  Musculoskeletal/feet no complaints.  Urinary no complaints.  Hematological: iron deficiency anemia treated with iron infusions in 2018.  Outpatient Medications Marked as Taking for the 10/3/19 encounter (Office Visit) with Omer Alcazar MD   Medication Sig Dispense Refill   •  insulin lispro (HUMALOG) 100 UNIT/ML injectable solution Inject 180 units via insulin pump daily as directed. 170 mL 2   • losartan (COZAAR) 100 MG tablet TAKE 1 TABLET BY MOUTH DAILY 90 tablet 0   • metFORMIN (GLUCOPHAGE) 500 MG tablet TAKE 1 TABLET BY MOUTH TWICE DAILY 180 tablet 0   • amLODIPine (NORVASC) 5 MG tablet Take 1 tablet by mouth daily. 90 tablet 3   • GARLIC PO Take 1 tablet by mouth daily.     • Insulin Infusion Pump SUSAN Humalog insulin pump      • Multiple Vitamin tablet Take 1 tablet by mouth daily.     • cetirizine (ZYRTEC) 10 MG tablet Take 10 mg by mouth daily. DOSE UNKNOWN     • Cholecalciferol (VITAMIN D3) 1000 UNITS capsule Take 1,000 Units by mouth daily.        ALLERGIES:   Allergen Reactions   • Aspirin    • Nsaids HIVES   • Toradol HIVES       Past Medical History:   Diagnosis Date   • Allergic rhinitis    • Essential (primary) hypertension    • Hyperlipidemia    • IDDM (insulin dependent diabetes mellitus) (CMS/[facility]) 1993    diagnosed during pregnancy   • Obesity    • Proteinuria    • Vitamin D deficiency      Past Surgical History:   Procedure Laterality Date   • Lymph node biopsy       Family History   Problem Relation Age of Onset   • Asthma Mother    • Asthma Father    • High blood pressure Father    • Diabetes Father    • Stroke Father 74        major thrombotic stroke   • Thyroid Daughter 20        hyperthyroidism   • Diabetes Daughter 20   • Diabetes Paternal Aunt    • Diabetes Paternal Uncle    • Diabetes Paternal Grandmother    • Diabetes Paternal Grandfather    • Diabetes Other         cousins on father's side     Social History     Tobacco Use   • Smoking status: Never Smoker   • Smokeless tobacco: Never Used   • Tobacco comment: Never smoked   Substance Use Topics   • Alcohol use: No     Alcohol/week: 0.0 standard drinks     Frequency: Never     Drinks per session: 1 or 2     Binge frequency: Never   • Drug use: No       PHYSICAL EXAMINATION:  Visit Vitals  /78 (BP  Location: LUE - Left upper extremity, Patient Position: Sitting, Cuff Size: Large Adult)   Pulse 80   Ht 5' 4\" (1.626 m)   Wt 97.6 kg   LMP 09/28/2019 (Exact Date)   BMI 36.94 kg/m²     Body mass index is 36.94 kg/m².  Constitutional:  Well developed, well nourished, central body obesity noted, no acute distress, non-toxic appearance.   Eyes:  PERRL (Pupils equal, round, reactive to light), conjunctivae normal.  HENT:  Atraumatic, external ears normal, nose normal, oropharynx moist.   Neck- Normal range of motion, no tenderness, supple.  Thyroid gland is not enlarged, no distinct nodules are palpable. Positive for acanthosis nigricans in the posterior neck crease.  Respiratory:  No respiratory distress, normal breath sounds, no rales, no wheezing.   Cardiovascular:  Normal rate, normal rhythm, no murmurs, no gallops, no rubs.   Musculoskeletal:  No edema, no tenderness, no deformities.  Back- No tenderness.  Integument:  Well hydrated, no rash.   Lymphatic:  No anterior or posterior cervical lymphadenopathy noted.   Neurologic:  Alert and oriented x3, CN (cranial nerves) 2-12 normal, normal motor function, normal sensory function, no focal deficits noted.   Psychiatric:  Speech and behavior appropriate.     LABS:  Date  hemoglobin A1c  urine microalbumin/creatinine ratio  10/2/2019                8.6%                                          --  3/1/2019                   8.1%                                          --  11/21/2018               8.0%                                   178.6 mcg/mg creatinine  7/17/2018                  7.8%  2/28/2018                  7.8%  11/16/2017                7.6%  7/3/2017  7.3%                                        --  1/3/2017  7.5%                                      263.1 mcg/mg creatinine  6/8/2016  7.6%    67.2 mcg/mg creatinine  10/21/2015  7.6%    113.2 mcg/mg creatinine  3/24/2015  8.1%    71.9 mcg/mg creatinine  Lab Services on 10/02/2019   Component Date Value  Ref Range Status   • Hemoglobin A1C 10/02/2019 8.6* 4.5 - 5.6 % Final    Comment:    ----DIABETIC SCREENING---  NON DIABETIC                 <5.7%  INCREASED RISK                5.7-6.4%  DIAGNOSTIC FOR DIABETES      >6.4%     ----DIABETIC CONTROL---     A1C%           eAG mg/dL  6.0            126  6.5            140  7.0            154  7.5            169  8.0            183  8.5            197  9.0            212  9.5            226  10.0           240       WBC (K/mcL)   Date Value   07/06/2019 9.4     RBC (mil/mcL)   Date Value   07/06/2019 4.33     HCT (%)   Date Value   07/06/2019 34.4 (L)     HGB (g/dL)   Date Value   07/06/2019 10.9 (L)     PLT (K/mcL)   Date Value   07/06/2019 433   03/08/2013 436     TSH (mcUnits/mL)   Date Value   07/06/2019 1.537     Sodium (mmol/L)   Date Value   07/06/2019 142     Potassium (mmol/L)   Date Value   07/06/2019 4.2     Chloride (mmol/L)   Date Value   07/06/2019 105     Glucose (mg/dL)   Date Value   07/06/2019 124 (H)     CALCIUM (mg/dL)   Date Value   07/06/2019 9.1     Carbon Dioxide (mmol/L)   Date Value   07/06/2019 24     BUN (mg/dL)   Date Value   07/06/2019 11     Creatinine (mg/dL)   Date Value   07/06/2019 0.71     AST/SGOT (Units/L)   Date Value   07/06/2019 43 (H)     ALT/SGPT (Units/L)   Date Value   07/06/2019 88 (H)     ALK PHOSPHATASE (Units/L)   Date Value   07/06/2019 126 (H)     TOTAL BILIRUBIN (mg/dL)   Date Value   07/06/2019 0.2     TSH (mcUnits/mL)   Date Value   07/06/2019 1.537     CHOLESTEROL (mg/dL)   Date Value   07/06/2019 245 (H)     HDL (mg/dL)   Date Value   07/06/2019 47 (L)     CHOL/HDL (no units)   Date Value   07/06/2019 5.2 (H)     TRIGLYCERIDE (mg/dL)   Date Value   07/06/2019 157 (H)     CALCULATED LDL (mg/dL)   Date Value   07/06/2019 167 (H)     WBC (K/mcL)   Date Value   07/06/2019 9.4     RBC (mil/mcL)   Date Value   07/06/2019 4.33     HCT (%)   Date Value   07/06/2019 34.4 (L)     HGB (g/dL)   Date Value   07/06/2019 10.9 (L)      PLT (K/mcL)   Date Value   07/06/2019 433   03/08/2013 436     MICROALBUMIN/CREATININE (mg/g)   Date Value   11/21/2018 178.6 (H)     ASSESSMENT   1. 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 Insulin pump titration      DISCUSSION  I discussed with Phoebe that basal insulin doses being increased she is advised to continue with present bolus insulin and current medications. C-peptide level has been low we discussed possibility of SLGT inhibitors or GLP analog could also be a possibility especially to control weight but she worries about side effects and wants to hold off. She will do A1c test before follow-up.  PLAN  Basal insulin dose rates increase MN-4 AM 4.0U/H, 4 AM to 11 AM 4.25U/H. 11 AM to 10 PM 4.25U/H, 10PM-MN 4.0 U/H. Total basal rate 100.1 units.  Bolus insulin doses target glucose levels, active insulin time remains same. Insulin sensitivity remains same.  Advise to continue with other medications.  Recommend follow-up in 4 months.  Non fasting A1C test one week before follow-up.  Ophthalmology referral for eye exam is advised.   [see HPI] : see HPI [Dysuria] : no dysuria [Incontinence] : incontinence [Hesitancy] : urinary hesitancy [Nocturia] : nocturia [Genital Lesion] : no genital lesions [Testicular Pain] : no testicular pain [Negative] : Heme/Lymph

## 2023-12-14 LAB
25(OH)D3 SERPL-MCNC: 22.1 NG/ML
ALBUMIN SERPL ELPH-MCNC: 3.8 G/DL
ANION GAP SERPL CALC-SCNC: 10 MMOL/L
APPEARANCE: CLEAR
BACTERIA: NEGATIVE /HPF
BILIRUBIN URINE: NEGATIVE
BLOOD URINE: NEGATIVE
BUN SERPL-MCNC: 32 MG/DL
CALCIUM SERPL-MCNC: 9.6 MG/DL
CALCIUM SERPL-MCNC: 9.7 MG/DL
CAST: 0 /LPF
CHLORIDE SERPL-SCNC: 103 MMOL/L
CO2 SERPL-SCNC: 24 MMOL/L
COLOR: YELLOW
CREAT SERPL-MCNC: 1.89 MG/DL
CREAT SPEC-SCNC: 89 MG/DL
CREAT/PROT UR: 0.8 RATIO
EGFR: 36 ML/MIN/1.73M2
EPITHELIAL CELLS: 1 /HPF
ESTIMATED AVERAGE GLUCOSE: 200 MG/DL
FERRITIN SERPL-MCNC: 149 NG/ML
GLUCOSE QUALITATIVE U: 100 MG/DL
GLUCOSE SERPL-MCNC: 168 MG/DL
HBA1C MFR BLD HPLC: 8.6 %
HCT VFR BLD CALC: 41.3 %
HGB BLD-MCNC: 13.2 G/DL
IRON SATN MFR SERPL: 18 %
IRON SERPL-MCNC: 53 UG/DL
KETONES URINE: NEGATIVE MG/DL
LEUKOCYTE ESTERASE URINE: NEGATIVE
MCHC RBC-ENTMCNC: 29.3 PG
MCHC RBC-ENTMCNC: 32 GM/DL
MCV RBC AUTO: 91.6 FL
MICROSCOPIC-UA: NORMAL
NITRITE URINE: NEGATIVE
PARATHYROID HORMONE INTACT: 153 PG/ML
PH URINE: 7
PHOSPHATE SERPL-MCNC: 3.1 MG/DL
PLATELET # BLD AUTO: 180 K/UL
POTASSIUM SERPL-SCNC: 5.1 MMOL/L
PROT UR-MCNC: 70 MG/DL
PROTEIN URINE: 100 MG/DL
PSA SERPL-MCNC: 3.56 NG/ML
RBC # BLD: 4.51 M/UL
RBC # FLD: 13.7 %
RED BLOOD CELLS URINE: 1 /HPF
SODIUM SERPL-SCNC: 137 MMOL/L
SPECIFIC GRAVITY URINE: 1.02
TIBC SERPL-MCNC: 300 UG/DL
UIBC SERPL-MCNC: 247 UG/DL
UROBILINOGEN URINE: 0.2 MG/DL
WBC # FLD AUTO: 9.67 K/UL
WHITE BLOOD CELLS URINE: 1 /HPF

## 2023-12-20 ENCOUNTER — APPOINTMENT (OUTPATIENT)
Dept: ENDOCRINOLOGY | Facility: CLINIC | Age: 78
End: 2023-12-20
Payer: MEDICARE

## 2023-12-20 VITALS
HEIGHT: 65 IN | HEART RATE: 77 BPM | OXYGEN SATURATION: 96 % | BODY MASS INDEX: 26.99 KG/M2 | WEIGHT: 162 LBS | SYSTOLIC BLOOD PRESSURE: 122 MMHG | RESPIRATION RATE: 16 BRPM | DIASTOLIC BLOOD PRESSURE: 70 MMHG | TEMPERATURE: 97.6 F

## 2023-12-20 DIAGNOSIS — E83.52 HYPERCALCEMIA: ICD-10-CM

## 2023-12-20 LAB — GLUCOSE BLDC GLUCOMTR-MCNC: 140

## 2023-12-20 PROCEDURE — 99214 OFFICE O/P EST MOD 30 MIN: CPT | Mod: 25

## 2023-12-20 PROCEDURE — 95251 CONT GLUC MNTR ANALYSIS I&R: CPT

## 2023-12-20 PROCEDURE — 82962 GLUCOSE BLOOD TEST: CPT

## 2023-12-20 RX ORDER — BLOOD SUGAR DIAGNOSTIC
STRIP MISCELLANEOUS
Qty: 300 | Refills: 6 | Status: ACTIVE | COMMUNITY
Start: 2023-12-20 | End: 1900-01-01

## 2023-12-20 RX ORDER — BLOOD-GLUCOSE METER
W/DEVICE EACH MISCELLANEOUS
Qty: 1 | Refills: 0 | Status: ACTIVE | COMMUNITY
Start: 2023-12-20 | End: 1900-01-01

## 2023-12-20 RX ORDER — LANCETS 33 GAUGE
EACH MISCELLANEOUS
Qty: 300 | Refills: 2 | Status: ACTIVE | COMMUNITY
Start: 2023-12-20 | End: 1900-01-01

## 2023-12-20 NOTE — ASSESSMENT
[Diabetes Foot Care] : diabetes foot care [Long Term Vascular Complications] : long term vascular complications of diabetes [Carbohydrate Consistent Diet] : carbohydrate consistent diet [Importance of Diet and Exercise] : importance of diet and exercise to improve glycemic control, achieve weight loss and improve cardiovascular health [Exercise/Effect on Glucose] : exercise/effect on glucose [Hypoglycemia Management] : hypoglycemia management [Glucagon Use] : glucagon use [Ketone Testing] : ketone testing [Action and use of Insulin] : action and use of short and long-acting insulin [Self Monitoring of Blood Glucose] : self monitoring of blood glucose [Insulin Self-Administration] : insulin self-administration [Injection Technique, Storage, Sharps Disposal] : injection technique, storage, and sharps disposal [Sick-Day Management] : sick-day management [Retinopathy Screening] : Patient was referred to ophthalmology for retinopathy screening [Diabetic Medications] : Risks and benefits of diabetic medications were discussed [FreeTextEntry1] : 1. T2DM, severely uncontrolled with multiple microvascular complications Extremely poor adherence and poor understanding of the disease - advised on meds compliance and diabetic complications - I prev called and spoke with his and d/w PCP- I'm very concerned with his memory and cognitive decline. must see a neurologist and renal. Per my recommendation, patient should not be driving at this point. - Breanna 3 and recheck FS if sees low readings - basal: 12 am - 0.65 un/hr 3 am - 0.65 un/hr 12 pm - 0.75 un/hr 5 pm - 0.65 un/hr 9 pm - 0.8 un/hr  will not use bolus calculator (unable to monitor carbs), but should inject 3-4un ac meals - Trulicity 0.75 mg qw - glucagon kit - Will consider using a small dose of SGLT2 inhibitors - advised to bring the reports of his stress tests/ echo - podiatry evaluation  2. Hypercalcemia/ HPT - monitor ca/PTH - add vitamin D3 2000 IU/day - once D repleted, will collect 24h urine calcium   *** son (Marc) [phone] wife Mervin - [phone].

## 2023-12-20 NOTE — HISTORY OF PRESENT ILLNESS
[FreeTextEntry1] : F/u for diabetes management  *** Dec 20, 2023 ***  feels ok, last a1c 8.6%, but as per family, started on a new diet 2 weeks ago, after the last blood work  - basal: 12 am - 0.75 un/hr 3 am - 0.65 un/hr 12 pm - 0.75 un/hr 5 pm - 0.65 un/hr 9 pm - 0.8 un/hr  Date of download:  12/20/2023  Diabetes Medications and Dosage: as above Indication for CGMS: verify a change in the treatment regimen, identify periods of hypoglycemia/ hyperglycemia.  Modal day report: pattern.  Pt with HYPO  7% of the time ( NONE below 54),  77% in target range Hyperglycemia:  16% elevation  Identified issues: carbohydrate counting. asymptomatic when "low numbers' dates analyzed: 12/07/2023 - 12/20/2023  today ppg in the office- 140 with breanna reading of 97  *** Sep 20, 2023 *** missed f/u appts staying on OP, Trulicity 0.75 mg qw (states that misses frequently) just received Breanna 3, but did not use it yet   - basal:  12 am - 0.75 un/hr 3 am - 0.65 un/hr 12 pm - 0.7 un/hr 5 pm - 0.65 un/hr 9 pm - 0.75 un/hr  not using bolus calculator. not injecting premeal if "numbers are normal"  Date of download:  09/20/2023  Diabetes Medications and Dosage: as above Indication for CGMS: verify a change in the treatment regimen, identify periods of hypoglycemia/ hyperglycemia.  Modal day report: pattern.  Pt with HYPO  7% of the time ( 2% below 54),  60% in target range Hyperglycemia:  31% elevation  Identified issues: carbohydrate counting. overcorrecting at night if sugars are hign dates analyzed:  09/07/2023- 09/20/2023 *** Feb 13, 2023 ***  feels better  taking Omnipod , trulicity 0.75 mg qw basal: 0.65 un/hr I:C- 15 gm ISF- 50 target - 100 however, not using the bolus calculator. injects on ave 3 un for meals, but skips frequently. also, injects post meals  Date of download:  02/13/2023  Diabetes Medications and Dosage: as above Indication for CGMS: verify a change in the treatment regimen, identify periods of hypoglycemia/ hyperglycemia.  Modal day report: pattern.  Pt with HYPO  3% of the time ( 3% below 54),  58% in target range Hyperglycemia:  36% elevation  Identified issues: carbohydrate counting dates analyzed:  01/31/2023- 02/13/2023   *** Nov 21, 2022 ***  came alone.  back on Omnipod but forgot to bring his PDM did not bring his Breanna again- forgets easily.  taking Trulicity 0.75 mg qw  can't recall his FS  *** Nov 10, 2022 ***  feels ok, denies any c/o stopped Omnipod several days ago ( can't explain why), resumed  Lantus 10 un HS,  but mostly not using Humalog never resumed Trulicity. Received a sample of Ozempic but did not start it either (not comfortable using it) not using CGM recently, did not bring his reader for review  *** Aug 10, 2022 ***  feels ok taking Lantus 9 to 10 units HS, Humalog 3 to 4 un ac B only not taking trulicity yet wearing breanna Date of download:  08/10/2022  Diabetes Medications and Dosage: as above Indication for CGMS: verify a change in the treatment regimen, identify periods of hypoglycemia/ hyperglycemia.  Modal day report: pattern.  Pt with HYPO  0% of the time ( NONE below 54),  47% in target range Hyperglycemia:  53% elevation  Identified issues: carbohydrate counting, spikes post lunch and dinner dates analyzed: 7/28/22 - 08/10/2022   *** Apr 20, 2022 ***  missed f/u visits again. admitted in 03/22 for uncontrolled sugar. Since discharge, more compliant with the diet, BS monitoring and taking his meds presently is on Lantus 8 to 9 units, not using Humalog scale but take son average 4 un ac meals stopped  Januvia  last creat- 1.86  Date of download:  4/20/22 Diabetes Medications and Dosage: as above Indication for CGMS: verify a change in the treatment regimen, identify periods of hypoglycemia/ hyperglycemia.  Modal day report: pattern.  Pt with HYPO  2% of the time (NONE below 54),  72% in target range Hyperglycemia: 26 % elevation  Identified issues: carbohydrate counting dates analyzed: 4/7/20-4/20/22   *** Nov 10, 2021 ***  missed f/u appts taking Lantus 14 units,  Humalog ac B and ac D - skips ac lunch, not using  Januvia  labs from 8/21- a1c- 9.2, creat- 1.69, potassium- 6.9  wearing Breanna Date of download:  11/10/21 Diabetes Medications and Dosage: as above Indication for CGMS: verify a change in the treatment regimen, identify periods of hypoglycemia/ hyperglycemia.  Modal day report: pattern.  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  Modal day report: pattern.  Hypoglycemia: patterned, Pt with HYPO 7 % of the time (2% below 54), 65 % in target range Hyperglycemia: 28 % elevation  Identified issues: carbohydrate counting; hypo's after taking prandin and not eating dates analyzed: 10/18/19 - 10/30/19  took 15 units of lantus this am, and prandin 1mg ac B- today in the office p/B- 232 a1c- 8.0,  urine m/alb- 112,  c-pept- 3.5 w/ insulin -7.9 creat- 1.78, K- 5.4, ca- 10.6 LDL- 88  HISTORY OF PRESENT ILLNESS.   Mr. MINOR was diagnosed with Diabetes Mellitus Type 2 in 1997 Reports history HTN, dyslipidemia, renal hemorrhage (s/p right nephrectomy), PRDRP (s/p laser therapy) He denies CAD. He was previously on Lantus 10 units , Humalog and metformin, but stopped everything about 2-3 months ago Presently on repaglinide (not sure of the dose) Blood sugars are checked once a day.  Did not bring log book, but reported are typically as following: FBS- 117-200, not checking other times  Hypoglycemia frequency: twice a month Fingerstick glucose in the office today is 133 mg/dL  hours after eating.  Diet: not following ADA Exercise: none   **** Last dilated eye - 10/21 Last podiatry visit  - 10/21 Last cardiology evaluation - 2017 Last stress test - 2017, normal per patient Last 2-D Echo - 2017, normal per patient Last nephrology evaluation - 6/19  Last neurology evaluation- none

## 2024-01-01 ENCOUNTER — RX RENEWAL (OUTPATIENT)
Age: 79
End: 2024-01-01

## 2024-01-01 RX ORDER — LATANOPROST/PF 0.005 %
0.01 DROPS OPHTHALMIC (EYE)
Qty: 7.5 | Refills: 3 | Status: ACTIVE | COMMUNITY
Start: 2021-01-20 | End: 1900-01-01

## 2024-01-02 NOTE — REVIEW OF SYSTEMS
Per ABDULLAHI aMrch I called [facility]a to refill both meds.  Patient was on both medication while in [place].Patient should complete Cipro in 6 days and he is also tempering of Tizanidine..  Patient was informed   [see HPI] : see HPI [Incontinence] : incontinence [Hesitancy] : urinary hesitancy [Nocturia] : nocturia [Negative] : Heme/Lymph [Dysuria] : no dysuria [Genital Lesion] : no genital lesions [Testicular Pain] : no testicular pain

## 2024-01-23 ENCOUNTER — OFFICE (OUTPATIENT)
Dept: URBAN - METROPOLITAN AREA CLINIC 32 | Facility: CLINIC | Age: 79
Setting detail: OPHTHALMOLOGY
End: 2024-01-23
Payer: MEDICARE

## 2024-01-23 DIAGNOSIS — E13.3312: ICD-10-CM

## 2024-01-23 DIAGNOSIS — H33.312: ICD-10-CM

## 2024-01-23 DIAGNOSIS — H35.372: ICD-10-CM

## 2024-01-23 PROCEDURE — 99213 OFFICE O/P EST LOW 20 MIN: CPT | Mod: 25 | Performed by: OPHTHALMOLOGY

## 2024-01-23 PROCEDURE — 92134 CPTRZ OPH DX IMG PST SGM RTA: CPT | Performed by: OPHTHALMOLOGY

## 2024-01-23 PROCEDURE — 67028 INJECTION EYE DRUG: CPT | Mod: LT | Performed by: OPHTHALMOLOGY

## 2024-01-23 ASSESSMENT — CONFRONTATIONAL VISUAL FIELD TEST (CVF)
OD_FINDINGS: FULL
OS_FINDINGS: FULL

## 2024-01-30 ENCOUNTER — RX RENEWAL (OUTPATIENT)
Age: 79
End: 2024-01-30

## 2024-02-02 ENCOUNTER — RX RENEWAL (OUTPATIENT)
Age: 79
End: 2024-02-02

## 2024-02-05 ENCOUNTER — NON-APPOINTMENT (OUTPATIENT)
Age: 79
End: 2024-02-05

## 2024-03-05 ENCOUNTER — OFFICE (OUTPATIENT)
Facility: LOCATION | Age: 79
Setting detail: OPHTHALMOLOGY
End: 2024-03-05
Payer: MEDICARE

## 2024-03-05 ENCOUNTER — APPOINTMENT (OUTPATIENT)
Dept: INTERNAL MEDICINE | Facility: CLINIC | Age: 79
End: 2024-03-05
Payer: MEDICARE

## 2024-03-05 VITALS
BODY MASS INDEX: 27.49 KG/M2 | WEIGHT: 165 LBS | OXYGEN SATURATION: 98 % | HEART RATE: 74 BPM | DIASTOLIC BLOOD PRESSURE: 72 MMHG | HEIGHT: 65 IN | SYSTOLIC BLOOD PRESSURE: 150 MMHG

## 2024-03-05 DIAGNOSIS — R41.3 OTHER AMNESIA: ICD-10-CM

## 2024-03-05 DIAGNOSIS — E13.3291: ICD-10-CM

## 2024-03-05 DIAGNOSIS — H33.312: ICD-10-CM

## 2024-03-05 DIAGNOSIS — N18.32 CHRONIC KIDNEY DISEASE, STAGE 3B: ICD-10-CM

## 2024-03-05 DIAGNOSIS — H35.3131: ICD-10-CM

## 2024-03-05 DIAGNOSIS — E13.3312: ICD-10-CM

## 2024-03-05 DIAGNOSIS — H40.9 UNSPECIFIED GLAUCOMA: ICD-10-CM

## 2024-03-05 DIAGNOSIS — E11.65 TYPE 2 DIABETES MELLITUS WITH HYPERGLYCEMIA: ICD-10-CM

## 2024-03-05 DIAGNOSIS — H35.372: ICD-10-CM

## 2024-03-05 DIAGNOSIS — K21.9 GASTRO-ESOPHAGEAL REFLUX DISEASE W/OUT ESOPHAGITIS: ICD-10-CM

## 2024-03-05 PROCEDURE — 67028 INJECTION EYE DRUG: CPT | Mod: LT | Performed by: OPHTHALMOLOGY

## 2024-03-05 PROCEDURE — 36415 COLL VENOUS BLD VENIPUNCTURE: CPT

## 2024-03-05 PROCEDURE — 92134 CPTRZ OPH DX IMG PST SGM RTA: CPT | Performed by: OPHTHALMOLOGY

## 2024-03-05 PROCEDURE — 99214 OFFICE O/P EST MOD 30 MIN: CPT | Mod: 25

## 2024-03-05 RX ORDER — CLOPIDOGREL BISULFATE 75 MG/1
75 TABLET, FILM COATED ORAL
Qty: 90 | Refills: 1 | Status: ACTIVE | COMMUNITY
Start: 2022-05-09 | End: 1900-01-01

## 2024-03-05 NOTE — PLAN
[FreeTextEntry1] : Mr. SHERWIN MINOR 78 year male with a PMH HTN, uncontrolled DM2, CRI, has problem with memory, h/o renal cell carcinoma, glaucoma, h/o TIA present to the office to a f/u. F/u exam is performed. Recommend  to do a blood test today, further management will depend on the blood test results.   DM2, recommend strict low carb diet, be compliant with DM2 meds, continue humalog, lantus, trulicity f/u with endocrinologist HTN, continue present meds norvasc, metoprolol, hydralazine asa H/o TIA continue plavix Hyperlipidemia continue lipitor 80 mg qhs. BPH continue flomax f/u with urologist CRI continue  sodium bicarbonate, Glaucoma continue xalatan, f/u with ophtalmologist  F/u with endo, nephrologist, urologist.  Referred to see a cardiologist(has an uncontrolled DM2, HTN)  RTC to f/u in 2 wks. Patient is verbalized understanding

## 2024-03-05 NOTE — REVIEW OF SYSTEMS
[Wheezing] : no wheezing [Shortness Of Breath] : no shortness of breath [Negative] : Neurological [FreeTextEntry3] : has blurry vision on/off [FreeTextEntry6] : C/o cough with yellow-green phlegm production

## 2024-03-10 LAB
ALBUMIN SERPL ELPH-MCNC: 4.1 G/DL
ALP BLD-CCNC: 103 U/L
ALT SERPL-CCNC: 29 U/L
ANION GAP SERPL CALC-SCNC: 9 MMOL/L
AST SERPL-CCNC: 24 U/L
BILIRUB SERPL-MCNC: 0.5 MG/DL
BUN SERPL-MCNC: 27 MG/DL
CALCIUM SERPL-MCNC: 10.5 MG/DL
CHLORIDE SERPL-SCNC: 103 MMOL/L
CHOLEST SERPL-MCNC: 146 MG/DL
CO2 SERPL-SCNC: 26 MMOL/L
CREAT SERPL-MCNC: 2.04 MG/DL
EGFR: 33 ML/MIN/1.73M2
ESTIMATED AVERAGE GLUCOSE: 197 MG/DL
GLUCOSE SERPL-MCNC: 111 MG/DL
HBA1C MFR BLD HPLC: 8.5 %
HDLC SERPL-MCNC: 39 MG/DL
LDLC SERPL CALC-MCNC: 90 MG/DL
NONHDLC SERPL-MCNC: 108 MG/DL
POTASSIUM SERPL-SCNC: 5.5 MMOL/L
PROT SERPL-MCNC: 6.7 G/DL
SODIUM SERPL-SCNC: 139 MMOL/L
TRIGL SERPL-MCNC: 92 MG/DL
TSH SERPL-ACNC: 1.22 UIU/ML

## 2024-03-10 RX ORDER — SODIUM POLYSTYRENE SULFONATE 4.1 MEQ/G
POWDER, FOR SUSPENSION ORAL; RECTAL DAILY
Qty: 3 | Refills: 0 | Status: ACTIVE | COMMUNITY
Start: 2024-03-10 | End: 1900-01-01

## 2024-03-14 ENCOUNTER — OFFICE (OUTPATIENT)
Facility: LOCATION | Age: 79
Setting detail: OPHTHALMOLOGY
End: 2024-03-14
Payer: MEDICARE

## 2024-03-14 DIAGNOSIS — E13.3291: ICD-10-CM

## 2024-03-14 DIAGNOSIS — E13.3312: ICD-10-CM

## 2024-03-14 DIAGNOSIS — H25.13: ICD-10-CM

## 2024-03-14 PROCEDURE — 92014 COMPRE OPH EXAM EST PT 1/>: CPT | Performed by: OPHTHALMOLOGY

## 2024-03-14 PROCEDURE — 92134 CPTRZ OPH DX IMG PST SGM RTA: CPT | Performed by: OPHTHALMOLOGY

## 2024-03-14 RX ORDER — FLASH GLUCOSE SENSOR
KIT MISCELLANEOUS
Qty: 6 | Refills: 6 | Status: ACTIVE | COMMUNITY
Start: 2024-03-14 | End: 1900-01-01

## 2024-03-14 RX ORDER — INSULIN LISPRO 100 [IU]/ML
100 INJECTION, SOLUTION INTRAVENOUS; SUBCUTANEOUS
Qty: 30 | Refills: 11 | Status: ACTIVE | COMMUNITY
Start: 2022-08-30 | End: 1900-01-01

## 2024-03-14 ASSESSMENT — REFRACTION_CURRENTRX
OD_CYLINDER: -2.75
OD_SPHERE: PLANO
OS_SPHERE: +0.25
OD_VPRISM_DIRECTION: PROGS
OD_AXIS: 095
OS_CYLINDER: -2.50
OS_VPRISM_DIRECTION: PROGS
OD_ADD: +2.75
OS_ADD: +2.75
OS_AXIS: 087
OD_OVR_VA: 20/
OS_OVR_VA: 20/

## 2024-04-02 ENCOUNTER — OFFICE (OUTPATIENT)
Facility: LOCATION | Age: 79
Setting detail: OPHTHALMOLOGY
End: 2024-04-02
Payer: MEDICARE

## 2024-04-02 DIAGNOSIS — H35.372: ICD-10-CM

## 2024-04-02 DIAGNOSIS — E13.3312: ICD-10-CM

## 2024-04-02 PROBLEM — H25.13 CATARACT SENILE NUCLEAR SCLEROSIS; BOTH EYES: Status: ACTIVE | Noted: 2024-03-14

## 2024-04-02 PROCEDURE — 67028 INJECTION EYE DRUG: CPT | Mod: LT | Performed by: OPHTHALMOLOGY

## 2024-04-02 PROCEDURE — 92134 CPTRZ OPH DX IMG PST SGM RTA: CPT | Performed by: OPHTHALMOLOGY

## 2024-04-08 RX ORDER — TAMSULOSIN HYDROCHLORIDE 0.4 MG/1
0.4 CAPSULE ORAL
Qty: 90 | Refills: 1 | Status: ACTIVE | COMMUNITY
Start: 1900-01-01 | End: 1900-01-01

## 2024-04-24 ENCOUNTER — EMERGENCY (EMERGENCY)
Facility: HOSPITAL | Age: 79
LOS: 0 days | Discharge: HOME | End: 2024-04-25
Attending: STUDENT IN AN ORGANIZED HEALTH CARE EDUCATION/TRAINING PROGRAM
Payer: MEDICARE

## 2024-04-24 VITALS
OXYGEN SATURATION: 98 % | RESPIRATION RATE: 18 BRPM | HEART RATE: 62 BPM | DIASTOLIC BLOOD PRESSURE: 83 MMHG | TEMPERATURE: 98 F | WEIGHT: 164.02 LBS | HEIGHT: 65 IN | SYSTOLIC BLOOD PRESSURE: 143 MMHG

## 2024-04-24 DIAGNOSIS — Z90.5 ACQUIRED ABSENCE OF KIDNEY: Chronic | ICD-10-CM

## 2024-04-24 DIAGNOSIS — I12.9 HYPERTENSIVE CHRONIC KIDNEY DISEASE WITH STAGE 1 THROUGH STAGE 4 CHRONIC KIDNEY DISEASE, OR UNSPECIFIED CHRONIC KIDNEY DISEASE: ICD-10-CM
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
## 2024-04-24 DIAGNOSIS — M79.641 PAIN IN RIGHT HAND: ICD-10-CM

## 2024-04-24 DIAGNOSIS — N18.9 CHRONIC KIDNEY DISEASE, UNSPECIFIED: ICD-10-CM

## 2024-04-24 DIAGNOSIS — E11.22 TYPE 2 DIABETES MELLITUS WITH DIABETIC CHRONIC KIDNEY DISEASE: ICD-10-CM

## 2024-04-24 DIAGNOSIS — S63.91XA SPRAIN OF UNSPECIFIED PART OF RIGHT WRIST AND HAND, INITIAL ENCOUNTER: ICD-10-CM
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
## 2024-04-24 PROCEDURE — 99284 EMERGENCY DEPT VISIT MOD MDM: CPT

## 2024-04-24 NOTE — ED ADULT TRIAGE NOTE - CHIEF COMPLAINT QUOTE
Pt AAO x 3 ambulatory with steady gait c/o right hand pain , pt was in the car on Thursday  stopped shor and he braced himself with his right hand and his fingers bent back pt with pain and swelling to right hand

## 2024-04-24 NOTE — ED ADULT TRIAGE NOTE - WEIGHT IN LBS
Physical Therapy Daily Progress Note    VISIT#: 5    Subjective   Monica Pagan reports she made the mistake of going to the store on Saturday and they didn't have any buggies so she had to walk around the store for ~ 1 hr with increased pain and tightness in the L leg since. Pt notes she's been told she has drop foot since she was diagnosed with neuropathy. Pt states she needs to get in to see a doctor for her LB, hip & knee.     Pain Rating (0-10): 4    Objective     See Exercise, Manual, and Modality Logs for complete treatment.     Patient Education: cues for therex    Assessment/Plan Deferred balance and standing activities other than walking due to increased leg pain today. Added calf stretches which relieved calf discomfort and tightness.       Progress per Plan of Care and Progress strengthening /stabilization /functional activity as tolerated          Timed:         Manual Therapy:         mins  73284;     Therapeutic Exercise:   42      mins  69225;     Neuromuscular Clemencia:        mins  18114;    Therapeutic Activity:          mins  17881;     Gait Training:           mins  68886;     Ultrasound:          mins  47123;    Ionto                                   mins   77804  Self Care                            mins   31857  Canalith Repos                   mins  82427    Un-Timed:  Electrical Stimulation:         mins  23989 ( );  Dry Needling          mins self-pay  Traction          mins 10863  Low Eval          Mins  43121  Mod Eval          Mins  65593  High Eval                            Mins  34488  Re-Eval                               mins  14899    Timed Treatment:   42   mins   Total Treatment:    42    mins    Annamaria England PT  [place] License # [id number]  Physical Therapist  
[phone]

## 2024-04-25 VITALS
OXYGEN SATURATION: 99 % | SYSTOLIC BLOOD PRESSURE: 133 MMHG | DIASTOLIC BLOOD PRESSURE: 72 MMHG | RESPIRATION RATE: 18 BRPM | HEART RATE: 68 BPM

## 2024-04-25 PROCEDURE — 73110 X-RAY EXAM OF WRIST: CPT | Mod: 26,RT

## 2024-04-25 PROCEDURE — 73120 X-RAY EXAM OF HAND: CPT | Mod: 26,RT

## 2024-04-25 NOTE — ED ADULT NURSE NOTE - NSFALLUNIVINTERV_ED_ALL_ED
Bed/Stretcher in lowest position, wheels locked, appropriate side rails in place/Call bell, personal items and telephone in reach/Instruct patient to call for assistance before getting out of bed/chair/stretcher/Non-slip footwear applied when patient is off stretcher/[place] to call system/Physically safe environment - no spills, clutter or unnecessary equipment/Purposeful proactive rounding/Room/bathroom lighting operational, light cord in reach

## 2024-04-25 NOTE — ED ADULT NURSE NOTE - OBJECTIVE STATEMENT
Covering primary RN. Pt AOx4 c/o R hand pain and swelling x1 week. Pt was in the car and  in front stopped short, pt braced himself with his right hand and fingers bent back. C/o pain to 3rd, 4th, 5th knuckles and palmar surface of hand. Edema noted. Pt denies numbness/tingling. Pulses and sensation intact. Hx DM, HTN

## 2024-04-25 NOTE — ED PROVIDER NOTE - PHYSICAL EXAMINATION
General: No acute distress, mentation at baseline,  well nourished, well developed  HEENT: NCAT, Neck supple without meningismus, PERRL, no conjunctival injection  Lungs: CTAB, No wheeze or crackles, No retractions, No increased work of breathing  Heart: S1S2 RRR, No M/R/G, Pules equal Bilaterally in upper and lower extremities distally  Abd: soft, NT/ND, No guarding, No rebound.  No hernias, no palpable masses.  R hand: Fingers: TTP diffusely most pronounced at base of 4th MCP with swelling diffusely, able to range all extrmeities, normal color, normal tendon function including FDS and FDP functions   Hand: normal inspection, non tender, normal color, tendon function intact   Wrist: normal inspection, non tender, normal color, no joint swelling  All motor function and sensation intact among ulnar/radial/median nerve distribution   Skin: No rash noted, warm dry.  Neuro:  Grossly normal.  No difficulty ambulating. No focal deficits.  Psychiatric: Appropriate mood and affect.

## 2024-04-25 NOTE — ED PROVIDER NOTE - NSFOLLOWUPCLINICS_GEN_ALL_ED_FT
[facility]  Hand Surgery  [address], [address]  [place] [address]  Phone: [phone]  Fax:

## 2024-04-25 NOTE — ED PROVIDER NOTE - CARE PROVIDER_API CALL
Milla Hensley  Plastic Surgery  [address] [place] [address], [address]  [place] [address]  Phone: [phone]  Fax: [phone]  Established Patient  Follow Up Time: Urgent

## 2024-04-25 NOTE — ED PROVIDER NOTE - CLINICAL SUMMARY MEDICAL DECISION MAKING FREE TEXT BOX
78-year-old male with past ministry of hypertension diabetes CKD presenting with right hand pain for 1 week.  Patient states he hit against wall will try to brace himself fall.  Has had diffuse hand pain since.  Able to range all extremities, pain worse with extension.      Patient has mild tenderness diffusely over hand, most pronounced at fourth MCP base, neurovascularly intact.    X-rays negative for fracture, likely sprain.  Patient offered pain meds but does not want.  Will give hand surgery follow-up and sprain instructions

## 2024-04-25 NOTE — ED PROVIDER NOTE - PATIENT PORTAL LINK FT
You can access the FollowMyHealth Patient Portal offered by [facility] by registering at the following website: [email or web address]. By joining [facility]’s FollowMyHealth portal, you will also be able to view your health information using other applications (apps) compatible with our system.

## 2024-05-03 ENCOUNTER — RX RENEWAL (OUTPATIENT)
Age: 79
End: 2024-05-03

## 2024-05-04 RX ORDER — ATORVASTATIN CALCIUM 80 MG/1
80 TABLET, FILM COATED ORAL
Qty: 30 | Refills: 6 | Status: ACTIVE | COMMUNITY
Start: 2022-05-09 | End: 1900-01-01

## 2024-05-04 RX ORDER — HYDRALAZINE HYDROCHLORIDE 25 MG/1
25 TABLET ORAL TWICE DAILY
Qty: 60 | Refills: 6 | Status: ACTIVE | COMMUNITY
Start: 2022-04-29 | End: 1900-01-01

## 2024-05-07 ENCOUNTER — OFFICE (OUTPATIENT)
Facility: LOCATION | Age: 79
Setting detail: OPHTHALMOLOGY
End: 2024-05-07
Payer: MEDICARE

## 2024-05-07 DIAGNOSIS — E13.3312: ICD-10-CM

## 2024-05-07 DIAGNOSIS — E11.3211: ICD-10-CM

## 2024-05-07 DIAGNOSIS — E11.3292: ICD-10-CM

## 2024-05-07 DIAGNOSIS — H35.372: ICD-10-CM

## 2024-05-07 PROCEDURE — 67028 INJECTION EYE DRUG: CPT | Mod: LT | Performed by: OPHTHALMOLOGY

## 2024-05-07 PROCEDURE — 99213 OFFICE O/P EST LOW 20 MIN: CPT | Mod: 25 | Performed by: OPHTHALMOLOGY

## 2024-05-07 PROCEDURE — 92134 CPTRZ OPH DX IMG PST SGM RTA: CPT | Performed by: OPHTHALMOLOGY

## 2024-05-14 ENCOUNTER — OFFICE (OUTPATIENT)
Facility: LOCATION | Age: 79
Setting detail: OPHTHALMOLOGY
End: 2024-05-14
Payer: MEDICARE

## 2024-05-14 DIAGNOSIS — H25.13: ICD-10-CM

## 2024-05-14 DIAGNOSIS — H25.12: ICD-10-CM

## 2024-05-14 PROBLEM — E11.3292 DM TYPE 2; LEFT MILD WITHOUT ME, RIGHT MILD WITH ME: Status: ACTIVE | Noted: 2024-05-07

## 2024-05-14 PROBLEM — E11.3211 DM TYPE 2; LEFT MILD WITHOUT ME, RIGHT MILD WITH ME: Status: ACTIVE | Noted: 2024-05-07

## 2024-05-14 PROBLEM — H25.11 CATARACT SENILE NUCLEAR SCLEROSIS; RIGHT EYE, LEFT EYE, BOTH EYES: Status: ACTIVE | Noted: 2024-05-14

## 2024-05-14 PROCEDURE — 92136 OPHTHALMIC BIOMETRY: CPT | Mod: LT | Performed by: OPHTHALMOLOGY

## 2024-05-14 PROCEDURE — 92136 OPHTHALMIC BIOMETRY: CPT | Mod: TC | Performed by: OPHTHALMOLOGY

## 2024-05-14 PROCEDURE — 99214 OFFICE O/P EST MOD 30 MIN: CPT | Performed by: OPHTHALMOLOGY

## 2024-05-14 ASSESSMENT — CONFRONTATIONAL VISUAL FIELD TEST (CVF)
OD_FINDINGS: FULL
OS_FINDINGS: FULL

## 2024-05-15 ENCOUNTER — APPOINTMENT (OUTPATIENT)
Dept: NEPHROLOGY | Facility: CLINIC | Age: 79
End: 2024-05-15
Payer: MEDICARE

## 2024-05-15 VITALS
HEIGHT: 65 IN | BODY MASS INDEX: 27.21 KG/M2 | WEIGHT: 163.34 LBS | OXYGEN SATURATION: 98 % | SYSTOLIC BLOOD PRESSURE: 183 MMHG | TEMPERATURE: 97.1 F | HEART RATE: 65 BPM | DIASTOLIC BLOOD PRESSURE: 95 MMHG

## 2024-05-15 VITALS — DIASTOLIC BLOOD PRESSURE: 70 MMHG | SYSTOLIC BLOOD PRESSURE: 148 MMHG

## 2024-05-15 DIAGNOSIS — N18.32 CHRONIC KIDNEY DISEASE, STAGE 3B: ICD-10-CM

## 2024-05-15 PROCEDURE — 99214 OFFICE O/P EST MOD 30 MIN: CPT

## 2024-05-15 PROCEDURE — G2211 COMPLEX E/M VISIT ADD ON: CPT

## 2024-05-15 NOTE — HISTORY OF PRESENT ILLNESS
[FreeTextEntry1] : Here for follow up Present with wife  States BP is elevated today Stated he took his BP meds today this mng but cant remember the names Does not check BP at home Wife stated that he does not follow low salt diet and ate a foot long hot dog yesterday  Denies headaches, weakness, numbness/tingling, no n/v No edema Other ROS neg  Wife stated he eats salty foods, and does not exercise. [place]; eats cold cuts Last labs: k 5,5, cr 2.04 March 2024 egfr 33; a1c 8.5  Scheduled for cataract surgery Aug 2024

## 2024-05-15 NOTE — ASSESSMENT
[FreeTextEntry1] : 78 year male with a PMH HTN x 10 yrs, uncontrolled DM2 x 30 years (diabetic retinopathy, neuropathy), RCC s/p nephrectomy, BPH, seizure, hypercalcemia/primary hyperpth followed by endocrine, glaucoma, h/o TIA her for a routine follow up visit of his elevated creatinine.  #CKD 3b likely multifactorial 2/2 nephrectomy, DM nephropathy, HTN nephrosclerosis -Baseline SCr has been around 1.6-1.7-2.0 since 2019. -last cr 2.06 march 2024 -Urine A/Cr ratio is 80 (A2). UA with proteinuria, rest bland. Upr/cr of 0.2 -Will repeat UA, UP/C ratio, renal panel today -Asked to hydrate well.  -Discussed with patient about the importance of starting an ARB given his proteinuria, but in past has had hyperkalemia. can not put on ace or arb bc of this  -last a1c elevated; advised and counseled in detail needs better control of DM; advised low salt and glucose/carb diet; -advised good BP control  #HTN- needs better control  -Continue hydralazine 25 bid; atenolol 25, norvasc 10 -k elevated still, can not go on ace/arb  -will confirm all BP meds tomorrow with pt;  -low salt diet stressed   #hyperkalemia -repeat k today  -does not follow low k diet  #DM2- -last a1c>8 march 2024 -advised he needs better control  -patient endorses compliance with farxiga -Continue current diabetic medication with A1c goal < 7 -F/u with endocrine  #RCC s/p nephrectomy -Renal US done, has a right nephrectomy. enlarged prostate  #BPH -no bladder retention on US kidney and bladder scan  #hypercalcemia 2/2 primary HPTH -now resolved -Serum calcium has been elevated since 2022 ~10.6 - 10.7. iPTH was 145 suggesting primary HPTH since 2019. -Will follow up vitamin D and serum calcium levels today and decide on further vit D supplementation  #stop herbal/OTC meds decrease tea intake

## 2024-05-15 NOTE — PHYSICAL EXAM
[General Appearance - Alert] : alert [General Appearance - In No Acute Distress] : in no acute distress [General Appearance - Well Nourished] : well nourished [General Appearance - Well Developed] : well developed [Sclera] : the sclera and conjunctiva were normal [Outer Ear] : the ears and nose were normal in appearance [Neck Appearance] : the appearance of the neck was normal [] : no respiratory distress [Respiration, Rhythm And Depth] : normal respiratory rhythm and effort [Auscultation Breath Sounds / Voice Sounds] : lungs were clear to auscultation bilaterally [Apical Impulse] : the apical impulse was normal [Heart Rate And Rhythm] : heart rate was normal and rhythm regular [Heart Sounds] : normal S1 and S2 [Edema] : there was no peripheral edema [No CVA Tenderness] : no ~M costovertebral angle tenderness [Abnormal Walk] : normal gait [No Focal Deficits] : no focal deficits [Oriented To Time, Place, And Person] : oriented to person, place, and time [Impaired Insight] : insight and judgment were intact [Affect] : the affect was normal [Mood] : the mood was normal

## 2024-05-17 LAB
25(OH)D3 SERPL-MCNC: 32.8 NG/ML
ALBUMIN SERPL ELPH-MCNC: 4 G/DL
ANION GAP SERPL CALC-SCNC: 9 MMOL/L
APPEARANCE: CLEAR
BACTERIA: NEGATIVE /HPF
BILIRUBIN URINE: NEGATIVE
BLOOD URINE: NEGATIVE
BUN SERPL-MCNC: 24 MG/DL
CALCIUM SERPL-MCNC: 10.3 MG/DL
CALCIUM SERPL-MCNC: 10.3 MG/DL
CAST: 0 /LPF
CHLORIDE SERPL-SCNC: 102 MMOL/L
CO2 SERPL-SCNC: 25 MMOL/L
COLOR: YELLOW
CREAT SERPL-MCNC: 1.82 MG/DL
CREAT SPEC-SCNC: 33 MG/DL
CREAT/PROT UR: 1.4 RATIO
EGFR: 38 ML/MIN/1.73M2
EPITHELIAL CELLS: 0 /HPF
ESTIMATED AVERAGE GLUCOSE: 194 MG/DL
GLUCOSE QUALITATIVE U: NEGATIVE MG/DL
GLUCOSE SERPL-MCNC: 88 MG/DL
HBA1C MFR BLD HPLC: 8.4 %
HCT VFR BLD CALC: 42 %
HGB BLD-MCNC: 13.5 G/DL
KETONES URINE: NEGATIVE MG/DL
LEUKOCYTE ESTERASE URINE: NEGATIVE
MCHC RBC-ENTMCNC: 29.3 PG
MCHC RBC-ENTMCNC: 32.1 GM/DL
MCV RBC AUTO: 91.1 FL
MICROSCOPIC-UA: NORMAL
NITRITE URINE: NEGATIVE
PARATHYROID HORMONE INTACT: 190 PG/ML
PH URINE: 7.5
PHOSPHATE SERPL-MCNC: 3.1 MG/DL
PLATELET # BLD AUTO: 174 K/UL
POTASSIUM SERPL-SCNC: 4.8 MMOL/L
PROT UR-MCNC: 45 MG/DL
PROTEIN URINE: 30 MG/DL
RBC # BLD: 4.61 M/UL
RBC # FLD: 13.6 %
RED BLOOD CELLS URINE: 0 /HPF
SODIUM SERPL-SCNC: 137 MMOL/L
SPECIFIC GRAVITY URINE: 1.01
UROBILINOGEN URINE: 0.2 MG/DL
WBC # FLD AUTO: 7.78 K/UL
WHITE BLOOD CELLS URINE: 0 /HPF

## 2024-05-21 ENCOUNTER — RX RENEWAL (OUTPATIENT)
Age: 79
End: 2024-05-21

## 2024-05-21 RX ORDER — DULAGLUTIDE 0.75 MG/.5ML
0.75 INJECTION, SOLUTION SUBCUTANEOUS
Qty: 4 | Refills: 2 | Status: ACTIVE | COMMUNITY
Start: 2022-04-20 | End: 1900-01-01

## 2024-05-21 RX ORDER — PANTOPRAZOLE 40 MG/1
40 TABLET, DELAYED RELEASE ORAL
Qty: 180 | Refills: 1 | Status: ACTIVE | COMMUNITY
Start: 2022-05-09 | End: 1900-01-01

## 2024-05-23 RX ORDER — FINASTERIDE 5 MG/1
5 TABLET, FILM COATED ORAL DAILY
Qty: 90 | Refills: 3 | Status: ACTIVE | COMMUNITY
Start: 2024-05-23 | End: 1900-01-01

## 2024-05-28 RX ORDER — LEVOCETIRIZINE DIHYDROCHLORIDE 5 MG/1
5 TABLET ORAL
Qty: 30 | Refills: 1 | Status: ACTIVE | COMMUNITY
Start: 2022-06-08 | End: 1900-01-01

## 2024-05-29 ENCOUNTER — APPOINTMENT (OUTPATIENT)
Dept: UROLOGY | Facility: CLINIC | Age: 79
End: 2024-05-29
Payer: MEDICARE

## 2024-05-29 VITALS
HEIGHT: 65 IN | BODY MASS INDEX: 26.82 KG/M2 | SYSTOLIC BLOOD PRESSURE: 166 MMHG | DIASTOLIC BLOOD PRESSURE: 83 MMHG | HEART RATE: 65 BPM | TEMPERATURE: 97.8 F | OXYGEN SATURATION: 96 % | WEIGHT: 161 LBS

## 2024-05-29 DIAGNOSIS — Z85.528 PERSONAL HISTORY OF OTHER MALIGNANT NEOPLASM OF KIDNEY: ICD-10-CM

## 2024-05-29 DIAGNOSIS — N40.1 BENIGN PROSTATIC HYPERPLASIA WITH LOWER URINARY TRACT SYMPMS: ICD-10-CM

## 2024-05-29 DIAGNOSIS — C64.9 MALIGNANT NEOPLASM OF UNSPECIFIED KIDNEY, EXCEPT RENAL PELVIS: ICD-10-CM

## 2024-05-29 DIAGNOSIS — N28.9 DISORDER OF KIDNEY AND URETER, UNSPECIFIED: ICD-10-CM

## 2024-05-29 LAB
APPEARANCE: CLEAR
BACTERIA: NEGATIVE /HPF
BILIRUBIN URINE: NEGATIVE
BLOOD URINE: NEGATIVE
CAST: 0 /LPF
COLOR: YELLOW
EPITHELIAL CELLS: 0 /HPF
GLUCOSE QUALITATIVE U: NEGATIVE MG/DL
KETONES URINE: NEGATIVE MG/DL
LEUKOCYTE ESTERASE URINE: NEGATIVE
MICROSCOPIC-UA: NORMAL
NITRITE URINE: NEGATIVE
PH URINE: 8
PROTEIN URINE: 30 MG/DL
PSA SERPL-MCNC: 3.03 NG/ML
RED BLOOD CELLS URINE: 0 /HPF
SPECIFIC GRAVITY URINE: 1.01
UROBILINOGEN URINE: 0.2 MG/DL
WHITE BLOOD CELLS URINE: 0 /HPF

## 2024-05-29 PROCEDURE — 99214 OFFICE O/P EST MOD 30 MIN: CPT

## 2024-05-29 PROCEDURE — 51798 US URINE CAPACITY MEASURE: CPT

## 2024-05-29 RX ORDER — AZITHROMYCIN 250 MG/1
250 TABLET, FILM COATED ORAL
Qty: 1 | Refills: 0 | Status: DISCONTINUED | COMMUNITY
Start: 2023-12-05 | End: 2024-05-29

## 2024-05-29 NOTE — END OF VISIT
[FreeTextEntry4] :  This note was written by Alicia Mchugh on 05/29/2024 actively solely Bandar Bhatt M.D. I, Alicia Mchugh, am scribing for and in the presence of Bandar Bhatt M.D. in the following sections HISTORY OF PRESENT ILLNESS, PAST MEDICAL/FAMILY/SOCIAL HISTORY; REVIEW OF SYSTEMS; VITAL SIGNS; PHYSICAL EXAM; ASSESSMENT/PLAN.   All medical record entries made by this scribe at my, Bandar Bhatt M.D. direction and personally dictated by me on 05/29/2024. I personally performed the services described in the documentation, reviewed the documentation recorded by the scribe in my presence, and it accurately and completely records my words and actions. [Time Spent: ___ minutes] : I have spent [unfilled] minutes of time on the encounter.

## 2024-05-29 NOTE — HISTORY OF PRESENT ILLNESS
[FreeTextEntry1] : 07/07/2021: Mr. Gomez is a 74 y/o M presenting today for initial evaluation of urinary tract infection. Daytime frequency x 5. N x 1. No fever, but sometimes feels mild chills. Admits sometimes feeling mild burning. Finished taking Cephalexin 500 mg PO BID, taking some other antibiotics , should finish in a day or two. Has h/o diabetes, kidney stones, renal insufficiency.   PVR: 171 cc after two hours.   O/E: uncircumcised penis. balanitis. inflamed foreskin.   Balanitis: Practice foreskin hygiene. We will start Nystatin skin ointment.   RTO in 1 month for PVR and uroflow.   08/16/2021: Mr. GOMEZ is a 75 year male who presents today for a follow up for solitary kidney. He is doing well. N x 1-2. Daytime frequency x 3-4. He denies hematuria, dysuria, urgency and hesitancy.   Labs on 07/08/2021: BUN: 18 mg/dL; Creatinine: 1.68 mg/dl; UA: proteinuria, glycosuria; Culture: negative Uroflow not done as pt could not void; PVR at random 87 cc  O/E: balanitis much improved; uncircumcised phallus, adequate meatus, both testes descended, nontender, no mass palpable BLAKE: deferred  On Tamsulosin and Finasteride; will continue   Will get PSA and BMP  Follow up in 3 months to check PSA, BMP, uroflow and bladder scan    10/20/2022: Mr. GOMEZ is a 76 year male who presents today for  urgency, wearing a pad for about 2 months has h/o of Enlarged prostate with LUTS  No burning, N x 2, D q 2-3 hrs, flow is good, no PVR. Has urgency. Urinary stream is good. Voided volume : 98 mL   PVR: 5. Continue Tamsulosin and Finasteride.  Solitary acquired kidney. S/P  Right nephrectomy for ca 2017. GFR is 34 ml/min.    O/E normal genitalia.  Suggest; UA and Culture.  RTC: 6 months Follow up : UA, Culture, Uro -flow, and PVR      05/11/2023: Mr. GOMEZ is a 77 year male who presents today for Increased frequency, urgency and burning on urination for last 2-3 weeks. Using diapers. Has h/o Enlarged prostate with LUTS : On Tamsulosin and Finasteride.  PVR: 19 cc.  He denies hematuria and hesitancy.  POCT done in office. Trace of Leukocytes were shown. Urine sent for C/S. Prescribed  Ceftin for one week  and will f/u in 2 weeks to reevaluate.    RTC: 2 weeks to review lab results       05/30/2023: Mr. GOMEZ is a 77 year male who presents today for a follow up for UTI and frequent urination   Still experiencing frequent urination. Much improved , was voiding q 30 min , now 90 min.  No burning. Refilled ABX today for 2 weeks. Urine culture was positive.  On Finasteride. Was not able to urinate, urinated before coming in.  Pt. will drop off urine later for Urinalysis and Culture. Post 1 hr void : PVR: 30 cc.  Doing well on Finasteride. Will continue   PSA:  08/16/2021: 1.72 ng/mL  12/13/2022: 1.56 ng/mL  05/11/2023: 9.65 ng/mL ( had UTI)   RTC: 1 month for Follow up : UA, Culture, Uro -flow, and PVR     08/16/2023: Mr. GOMEZ is a 77 year male who presents today for a follow up for Enlarged prostate with LUTS   Patient is voiding and emptying bladder well. PVR: 9 cc  Stopped taking Finasteride 6 months ago. Still voiding well.  No longer needed.  On Tamsulosin: Renewed today  Doing well, and will continue   Denies hematuria, dysuria, urgency and hesitancy.  Pt. is wearing depends due to occasional leakage.    Elevated PSA: 08/16/21: 1.72 ng/mL 12/13/22: 1.56 ng/mL 05/11/23: 9.65 ng/mL (had UTI), most likely secondary to UTI. Treated with antibiotics. Will repeat UA, culture and PSA today.  PSA drawn today.   O/E: uncircumcised phallus, adequate meatus, both testes descended, nontender, no mass palpable.   RTC: 3 months for:  UA, Culture, Uro -flow, and PVR and to review psa, ua/cx results      12/13/2023:  Elevated PSA: 08/16/21: 1.72 ng/mL 12/13/22: 1.56 ng/mL 05/11/23: 9.65 ng/mL (had UTI), most likely secondary to UTI. Treated with antibiotics. Will repeat UA, culture and PSA today.  08/06/2023: 3.20 ng/ml PS Are drawn today.    LUT: on Tamsulosin, doing well. PVR 11 ml. .continue Tamsulosin   RTC: 6 months for Follow up: PSA, UA, culture, uroflow and bladder scan     05/29/2024: 78 year old male presents for a follow up visit for Enlarged Prostate with LUTS and elevated PSA.  Enlarged prostate with LUTS: Pt reports he sometimes has accidents. He states only urinates 1-2x during the night. Pt voiding and emptying well. PVR today: 9 cc. Doing well on Finasteride and Tamsulosin. Will continue.  Elevated PSA: 08/16/21: 1.72 ng/mL 12/13/22: 1.56 ng/mL 05/11/23: 9.65 ng/mL (had UTI), most likely secondary to UTI. Treated with antibiotics. Will repeat UA, culture and PSA today.  08/06/2023: 3.20 ng/ml 12/13/2023: 3.56 ng/mL PSA drawn today.  Pt unable to flow. PVR today: 9 cc.  PMHx: CKD Stage 3B being managed by Nephrologist. Solitary left kidney. S/P right nephrectomy 2017 for renal cell ca by Dr Macias at [facility].   Emphasized importance of preserving kidney function, and controlling BP and DM.  Urinalysis and culture done today. RTO in 6 months for follow up visit, UA, culture, PVR, Uroflow, and PSA.

## 2024-05-30 LAB — BACTERIA UR CULT: NORMAL

## 2024-06-04 ENCOUNTER — OFFICE (OUTPATIENT)
Facility: LOCATION | Age: 79
Setting detail: OPHTHALMOLOGY
End: 2024-06-04
Payer: MEDICARE

## 2024-06-04 DIAGNOSIS — E13.3312: ICD-10-CM

## 2024-06-04 DIAGNOSIS — H35.372: ICD-10-CM

## 2024-06-04 PROCEDURE — 92134 CPTRZ OPH DX IMG PST SGM RTA: CPT | Performed by: OPHTHALMOLOGY

## 2024-06-04 PROCEDURE — 67028 INJECTION EYE DRUG: CPT | Mod: LT | Performed by: OPHTHALMOLOGY

## 2024-06-05 RX ORDER — METOPROLOL SUCCINATE 25 MG/1
25 TABLET, EXTENDED RELEASE ORAL
Qty: 90 | Refills: 1 | Status: ACTIVE | COMMUNITY
Start: 2022-05-09 | End: 1900-01-01

## 2024-06-06 NOTE — ED ADULT TRIAGE NOTE - IDEAL BODY WEIGHT(KG)
INPATIENT BEHAVIORAL HEALTH PROGRESS NOTE/EVALUATION    Patient: Jemma Pandya Date: 2024   : 1999 Attending: Reese Langley MD   24 year old female      Chief Complaint: Follow up :  Patient Active Problem List   Diagnosis    GERD (gastroesophageal reflux disease)    Menorrhagia    S/P left oophorectomy    History of 2019 novel coronavirus disease (COVID-19)    Obesity    IUD (intrauterine device) in place     Subjective:  Jemma Pandya endorses symptoms of depression and anxiety noted below so we talked about doing a trial of Prozac.  She was informed that Prozac can increase suicidal thoughts in patients ages 24 and under.  She acknowledges understanding.  Patient is not exhibiting psychotic symptoms so I will discontinue the Risperdal due to the potential side effects.  I went through the criteria for borderline personality disorder with her and she seems to meet the criteria.  Discussed with her that she would benefit from dialectical behavior therapy so she will be given resources about this.  She is thankful for this.  Lastly, she has been finding hydroxyzine to be helpful for anxiety so she will be discharged on it.  She is requesting discharge on Saturday.  She is also interested in mental health IOP.  Patient has been attending groups while inpatient and finds them to be helpful.  She continues to struggle with suicidal thoughts but denies any specific plans.    Psychiatric ROS:  - Depression: Patient reports clear history of feeling hopeless, helpless, decreased concentration, decreased energy, guilty feelings, decreased interest.  - Anxiety: Patient reports clear history of feeling tense at shoulders/neck, worrying about day to day things, feeling fatigue, restless, irritable  - Appetite: fair  - Sleep: fair  - Auditory/Visual hallucinations/Paranoia: Denies  - Suicidal behavior: Present  - Homicidal thoughts: Denies    Precautions: q 15 minutes - Reason: Safekeeping    Treatment:  individual tx group tx    Laboratory Studies:     Lab Results   Component Value Date    WBC 10.9 06/03/2024    HGB 12.9 06/03/2024    HCT 38.7 06/03/2024     06/03/2024    SODIUM 141 06/03/2024    POTASSIUM 3.5 06/03/2024    CHLORIDE 105 06/03/2024    CO2 27 06/03/2024    GLUCOSE 91 06/03/2024    BUN 15 06/03/2024    CREATININE 0.73 06/03/2024    CALCIUM 9.1 06/03/2024    ALBUMIN 3.9 06/03/2024    MG 1.7 03/04/2024    BILIRUBIN 0.5 06/03/2024    ALKPT 75 06/03/2024    AST 13 06/03/2024    GPT 16 06/03/2024     CIWA Scores:   No data found.    Medication Side Effects: Absent    Medical ROS: Denies shortness of breath, chest pain.    Assessment for Harm to Self/Others: Moderate to severe risk unless remains hospitalized.    Vital 24 Hour Range Most Recent Value   Temperature No data recorded 98.2 °F (36.8 °C) (06/05/24 0736)   Pulse No data recorded 93 (06/05/24 0736)   Respiratory No data recorded 16 (06/04/24 1502)   Non-Invasive  Blood Pressure No data recorded 123/89 (06/05/24 0736)   Pulse Oximetry No data recorded 99 % (06/05/24 0736)   O2 No data recorded       Mental Status Exam:   General appearance: Disheveled  Speech  soft  Volume: soft  Latency: prolonged  Mood/affect: depressed, anxious  Psychomotor: slow  Associations: intact  Thought process: intact  Thought content: Denies HI.  has SI.   Perceptual disorders/hallucinations: none  Orientation: oriented to person, place, time, and general circumstances  Attention: decreased  Concentration: decreased  Language: Normal  Fund of knowledge: average  Memory: intact immediate, short and long term memory.    Insight: fair  Judgement: fair    Assessment:  Major depressive disorder  Rule out cluster B traits       Overall Response to Treatment: Making progress      Medication Changes: As noted above.     Labs: No new labs     Legal status: Voluntary     Reasons for continued hospitalization: Continued treatment of psychiatric symptoms/detox management  noted above, risk of harm to self/others.    This information is confidential and disclosure without patient consent or statutory authorization is prohibited by law.    Reese Langley MD  Inpatient Psychiatry     63

## 2024-06-20 ENCOUNTER — NON-APPOINTMENT (OUTPATIENT)
Age: 79
End: 2024-06-20

## 2024-06-20 ENCOUNTER — APPOINTMENT (OUTPATIENT)
Dept: CARDIOLOGY | Facility: CLINIC | Age: 79
End: 2024-06-20
Payer: MEDICARE

## 2024-06-20 VITALS — DIASTOLIC BLOOD PRESSURE: 64 MMHG | SYSTOLIC BLOOD PRESSURE: 140 MMHG | HEART RATE: 68 BPM

## 2024-06-20 DIAGNOSIS — R01.1 CARDIAC MURMUR, UNSPECIFIED: ICD-10-CM

## 2024-06-20 DIAGNOSIS — E78.5 HYPERLIPIDEMIA, UNSPECIFIED: ICD-10-CM

## 2024-06-20 DIAGNOSIS — I10 ESSENTIAL (PRIMARY) HYPERTENSION: ICD-10-CM

## 2024-06-20 DIAGNOSIS — Z86.73 PERSONAL HISTORY OF TRANSIENT ISCHEMIC ATTACK (TIA), AND CEREBRAL INFARCTION W/OUT RESIDUAL DEFICITS: ICD-10-CM

## 2024-06-20 PROCEDURE — 99204 OFFICE O/P NEW MOD 45 MIN: CPT

## 2024-06-20 PROCEDURE — G2211 COMPLEX E/M VISIT ADD ON: CPT

## 2024-06-20 PROCEDURE — 93000 ELECTROCARDIOGRAM COMPLETE: CPT

## 2024-06-20 PROCEDURE — 99214 OFFICE O/P EST MOD 30 MIN: CPT

## 2024-06-20 RX ORDER — AMLODIPINE BESYLATE 10 MG/1
10 TABLET ORAL DAILY
Qty: 90 | Refills: 1 | Status: DISCONTINUED | COMMUNITY
Start: 2023-05-04 | End: 2024-06-20

## 2024-06-20 NOTE — PHYSICAL EXAM
[Well Developed] : well developed [Well Nourished] : well nourished [No Acute Distress] : no acute distress [Normal Conjunctiva] : normal conjunctiva [Normal Venous Pressure] : normal venous pressure [No Carotid Bruit] : no carotid bruit [Normal S1, S2] : normal S1, S2 [No Rub] : no rub [No Gallop] : no gallop [Murmur] : murmur [Clear Lung Fields] : clear lung fields [Good Air Entry] : good air entry [No Respiratory Distress] : no respiratory distress  [Soft] : abdomen soft [Non Tender] : non-tender [No Masses/organomegaly] : no masses/organomegaly [Normal Bowel Sounds] : normal bowel sounds [Normal Gait] : normal gait [No Edema] : no edema [No Clubbing] : no clubbing [No Varicosities] : no varicosities [Edema ___] : edema [unfilled] [No Rash] : no rash [No Skin Lesions] : no skin lesions [Moves all extremities] : moves all extremities [No Focal Deficits] : no focal deficits [Normal Speech] : normal speech [Alert and Oriented] : alert and oriented [Normal memory] : normal memory [de-identified] : 2/6 systolic murmur at usb

## 2024-06-20 NOTE — DISCUSSION/SUMMARY
[EKG obtained to assist in diagnosis and management of assessed problem(s)] : EKG obtained to assist in diagnosis and management of assessed problem(s) [FreeTextEntry1] : Pt with systolic murmur, LE edema, Hx of TIA Will check TTE  HTN: BP borderline, unsure of medications. Continue. Crt stable at 1.82  HLD: Lipids great on high dose statin. Continue  CKD- stable crt, watch closely, sees renal  DM: Last a1c 8.4%, following with endo  Continue to encourage healthy lifestyle No active symptoms, CKD. Hold off on ischemic testing at this time given age and clinical stability   RV 6M

## 2024-06-20 NOTE — HISTORY OF PRESENT ILLNESS
[FreeTextEntry1] : 78M with HTN, HLD, DM, CKD, s/p TIA ( who presents for cardiac evaluation  Pt was advised to see a cardiologist Feeling well without complaints Patient denies chest pain, shortness of breath, palpitations, dizziness, lightheadedness, syncope. Not too active Crt 1.82, a1c 8.4%  Never smoker. Retired-. Accompanied by his wife  ECG: SR, no ST-T wave changes  Asa 81mg Plavix 75mg  Atorvastatin 80mg  Metoprolol 25mg Hydralazine 25mg BID   Trulicity Insulin

## 2024-06-24 ENCOUNTER — RX RENEWAL (OUTPATIENT)
Age: 79
End: 2024-06-24

## 2024-06-24 RX ORDER — INSULIN PUMP CONTROLLER
EACH MISCELLANEOUS
Qty: 6 | Refills: 3 | Status: ACTIVE | COMMUNITY
Start: 2022-06-29 | End: 1900-01-01

## 2024-06-28 ENCOUNTER — NON-APPOINTMENT (OUTPATIENT)
Age: 79
End: 2024-06-28

## 2024-07-09 ENCOUNTER — APPOINTMENT (OUTPATIENT)
Dept: ENDOCRINOLOGY | Facility: CLINIC | Age: 79
End: 2024-07-09

## 2024-07-11 ENCOUNTER — APPOINTMENT (OUTPATIENT)
Dept: ENDOCRINOLOGY | Facility: CLINIC | Age: 79
End: 2024-07-11
Payer: MEDICARE

## 2024-07-11 VITALS
DIASTOLIC BLOOD PRESSURE: 76 MMHG | RESPIRATION RATE: 16 BRPM | OXYGEN SATURATION: 97 % | TEMPERATURE: 97.3 F | HEART RATE: 57 BPM | SYSTOLIC BLOOD PRESSURE: 132 MMHG

## 2024-07-11 DIAGNOSIS — E11.65 TYPE 2 DIABETES MELLITUS WITH HYPERGLYCEMIA: ICD-10-CM

## 2024-07-11 PROCEDURE — 99214 OFFICE O/P EST MOD 30 MIN: CPT

## 2024-07-11 PROCEDURE — 95251 CONT GLUC MNTR ANALYSIS I&R: CPT

## 2024-07-11 PROCEDURE — G2211 COMPLEX E/M VISIT ADD ON: CPT

## 2024-07-15 ENCOUNTER — NON-APPOINTMENT (OUTPATIENT)
Age: 79
End: 2024-07-15

## 2024-07-16 ENCOUNTER — APPOINTMENT (OUTPATIENT)
Dept: INTERNAL MEDICINE | Facility: CLINIC | Age: 79
End: 2024-07-16
Payer: MEDICARE

## 2024-07-16 VITALS
HEART RATE: 65 BPM | OXYGEN SATURATION: 98 % | BODY MASS INDEX: 26.82 KG/M2 | SYSTOLIC BLOOD PRESSURE: 162 MMHG | WEIGHT: 161 LBS | DIASTOLIC BLOOD PRESSURE: 91 MMHG | HEIGHT: 65 IN

## 2024-07-16 DIAGNOSIS — Z00.00 ENCOUNTER FOR GENERAL ADULT MEDICAL EXAMINATION W/OUT ABNORMAL FINDINGS: ICD-10-CM

## 2024-07-16 DIAGNOSIS — E83.52 HYPERCALCEMIA: ICD-10-CM

## 2024-07-16 DIAGNOSIS — K59.00 CONSTIPATION, UNSPECIFIED: ICD-10-CM

## 2024-07-16 DIAGNOSIS — Z12.11 ENCOUNTER FOR SCREENING FOR MALIGNANT NEOPLASM OF COLON: ICD-10-CM

## 2024-07-16 DIAGNOSIS — N28.9 DISORDER OF KIDNEY AND URETER, UNSPECIFIED: ICD-10-CM

## 2024-07-16 DIAGNOSIS — H53.8 OTHER VISUAL DISTURBANCES: ICD-10-CM

## 2024-07-16 DIAGNOSIS — H40.9 UNSPECIFIED GLAUCOMA: ICD-10-CM

## 2024-07-16 DIAGNOSIS — C64.9 MALIGNANT NEOPLASM OF UNSPECIFIED KIDNEY, EXCEPT RENAL PELVIS: ICD-10-CM

## 2024-07-16 DIAGNOSIS — R41.3 OTHER AMNESIA: ICD-10-CM

## 2024-07-16 DIAGNOSIS — E78.5 HYPERLIPIDEMIA, UNSPECIFIED: ICD-10-CM

## 2024-07-16 DIAGNOSIS — K21.9 GASTRO-ESOPHAGEAL REFLUX DISEASE W/OUT ESOPHAGITIS: ICD-10-CM

## 2024-07-16 DIAGNOSIS — Z86.73 PERSONAL HISTORY OF TRANSIENT ISCHEMIC ATTACK (TIA), AND CEREBRAL INFARCTION W/OUT RESIDUAL DEFICITS: ICD-10-CM

## 2024-07-16 DIAGNOSIS — E11.9 TYPE 2 DIABETES MELLITUS W/OUT COMPLICATIONS: ICD-10-CM

## 2024-07-16 DIAGNOSIS — I10 ESSENTIAL (PRIMARY) HYPERTENSION: ICD-10-CM

## 2024-07-16 DIAGNOSIS — N18.32 CHRONIC KIDNEY DISEASE, STAGE 3B: ICD-10-CM

## 2024-07-16 PROCEDURE — 36415 COLL VENOUS BLD VENIPUNCTURE: CPT

## 2024-07-16 PROCEDURE — G0439: CPT

## 2024-07-18 LAB
CHOLEST SERPL-MCNC: 173 MG/DL
HDLC SERPL-MCNC: 40 MG/DL
LDLC SERPL CALC-MCNC: 113 MG/DL
NONHDLC SERPL-MCNC: 133 MG/DL
TRIGL SERPL-MCNC: 106 MG/DL
TSH SERPL-ACNC: 1.62 UIU/ML

## 2024-07-22 ENCOUNTER — RX RENEWAL (OUTPATIENT)
Age: 79
End: 2024-07-22

## 2024-07-23 RX ORDER — TIRZEPATIDE 2.5 MG/.5ML
2.5 INJECTION, SOLUTION SUBCUTANEOUS
Qty: 1 | Refills: 3 | Status: ACTIVE | COMMUNITY
Start: 2024-07-11

## 2024-07-24 ENCOUNTER — NON-APPOINTMENT (OUTPATIENT)
Age: 79
End: 2024-07-24

## 2024-07-26 NOTE — ED PROVIDER NOTE - CONSTITUTIONAL, MLM
normal,  alert,  in no acute distress,  well developed, well nourished,  ambulating without difficulty,  normal communication ability normal... Well appearing, awake, alert, oriented to person, place, time/situation and in no apparent distress.

## 2024-08-06 ENCOUNTER — APPOINTMENT (OUTPATIENT)
Dept: INTERNAL MEDICINE | Facility: CLINIC | Age: 79
End: 2024-08-06

## 2024-08-06 PROBLEM — R32 URINARY INCONTINENCE, UNSPECIFIED TYPE: Status: ACTIVE | Noted: 2024-08-06

## 2024-08-06 PROBLEM — Z01.818 PREOP TESTING: Status: ACTIVE | Noted: 2024-08-06

## 2024-08-06 PROCEDURE — 82962 GLUCOSE BLOOD TEST: CPT

## 2024-08-06 PROCEDURE — G2211 COMPLEX E/M VISIT ADD ON: CPT

## 2024-08-06 PROCEDURE — 99214 OFFICE O/P EST MOD 30 MIN: CPT

## 2024-08-07 ENCOUNTER — NON-APPOINTMENT (OUTPATIENT)
Age: 79
End: 2024-08-07

## 2024-08-09 ENCOUNTER — APPOINTMENT (OUTPATIENT)
Dept: INTERNAL MEDICINE | Facility: CLINIC | Age: 79
End: 2024-08-09

## 2024-08-09 PROCEDURE — 93306 TTE W/DOPPLER COMPLETE: CPT

## 2024-08-12 ENCOUNTER — RX RENEWAL (OUTPATIENT)
Age: 79
End: 2024-08-12

## 2024-08-13 NOTE — ADDENDUM
[FreeTextEntry1] : Labs, EKG were reviewed. Patient has an acceptable risk and medically cleared  for a proposal procedure. Recommend on the day of the procedure to take BP med with a sip of water. Hold meds for a DM2 on the day of the surgery

## 2024-08-13 NOTE — PHYSICAL EXAM
[TextEntry] : Constitutional: Well nourished, well appearing, not in acute distress, obese Head: Normocephalic, no lesions Eyes: PERRLA, conjunctiva is NL Ear: Ear canal is normal, tympanic membrane is intact Nose: Nasal turbinates are NL Throat: Clear, no exudates, no lesions Neck: Supple, no masses Chest: Lungs are clear, no rales, no rhonchi, no wheezing Heart: Regular rate, has a systolic murmur Abdomen: Soft, no tenderness, no distention, bowel sounds present. : No CVAT

## 2024-08-13 NOTE — PLAN
[FreeTextEntry1] : Mr. SHERWIN MINOR 78 year male with a PMH HTN, uncontrolled DM2, CRI, glaucoma, macular degeneration, non-proliferative diabetic retinopathy (receive Avastin injection in left eye), has problem with memory, h/o renal cell carcinoma, h/o TIA present to the office for a medical clearance.. Pre-op test was order. Recommend  to do blood test(will arrange blood test at home) EKG from 06/20/24 was reviewed Clearance will be completed after blood test will be reviewed

## 2024-08-13 NOTE — REVIEW OF SYSTEMS
[TextEntry] :  Constitutional:  Denies fever, recent changes in the weight Head: Denies headache, dizziness Eyes: C/o intermittent blurry vision, denies diplopia, tearing or pain Ears: Denies earache, tinnitus, hearing loss Nose: Denies nasal obstruction Throat: Denies throat pain Neck: Denies stiffness, muscle tenderness Chest: SOB with exertion and activity, Denies cough, wheezing, chest congestion CV: Denies chest pain, palpitation GI: +constipation, Denies abdominal pain, heartburn Genitourinary:  Denies dysuria Neuro: Denies changes in mental status

## 2024-08-13 NOTE — HISTORY OF PRESENT ILLNESS
[No Pertinent Cardiac History] : no history of aortic stenosis, atrial fibrillation, coronary artery disease, recent myocardial infarction, or implantable device/pacemaker [Chronic Anticoagulation] : chronic anticoagulation [Chronic Kidney Disease] : chronic kidney disease [Diabetes] : diabetes [(Patient denies any chest pain, claudication, dyspnea on exertion, orthopnea, palpitations or syncope)] : Patient denies any chest pain, claudication, dyspnea on exertion, orthopnea, palpitations or syncope [Asthma] : no asthma [COPD] : no COPD [Sleep Apnea] : no sleep apnea [Smoker] : not a smoker [FreeTextEntry1] : Mauricio cataract surgery [FreeTextEntry2] : 08/26/2024 [FreeTextEntry3] : Dr. Cabrera [FreeTextEntry4] : Mr. SHERWIN MINOR 78 year male with a PMH HTN, uncontrolled DM2, CRI, glaucoma, macular degeneration, non-proliferative diabetic retinopathy (receive Avastin injection in left eye), has problem with memory, h/o renal cell carcinoma, h/o TIA present to the office for a medical clearance. Patient is scheduled for a Left eye cataract surgery on 08/26/2024. Procedure will be perform by Dr. Cabrera

## 2024-08-16 ENCOUNTER — OFFICE (OUTPATIENT)
Facility: LOCATION | Age: 79
Setting detail: OPHTHALMOLOGY
End: 2024-08-16
Payer: MEDICARE

## 2024-08-16 DIAGNOSIS — H25.13: ICD-10-CM

## 2024-08-16 DIAGNOSIS — E11.3292: ICD-10-CM

## 2024-08-16 DIAGNOSIS — E11.3211: ICD-10-CM

## 2024-08-16 PROCEDURE — 99214 OFFICE O/P EST MOD 30 MIN: CPT | Performed by: OPHTHALMOLOGY

## 2024-08-16 ASSESSMENT — CONFRONTATIONAL VISUAL FIELD TEST (CVF)
OD_FINDINGS: FULL
OS_FINDINGS: FULL

## 2024-08-19 ENCOUNTER — APPOINTMENT (OUTPATIENT)
Dept: ENDOCRINOLOGY | Facility: CLINIC | Age: 79
End: 2024-08-19
Payer: MEDICARE

## 2024-08-19 PROCEDURE — 95251 CONT GLUC MNTR ANALYSIS I&R: CPT

## 2024-08-19 PROCEDURE — G0108 DIAB MANAGE TRN  PER INDIV: CPT

## 2024-08-20 ENCOUNTER — OFFICE (OUTPATIENT)
Facility: LOCATION | Age: 79
Setting detail: OPHTHALMOLOGY
End: 2024-08-20
Payer: MEDICARE

## 2024-08-20 DIAGNOSIS — E13.3312: ICD-10-CM

## 2024-08-20 DIAGNOSIS — H35.372: ICD-10-CM

## 2024-08-20 PROCEDURE — 67028 INJECTION EYE DRUG: CPT | Mod: LT | Performed by: OPHTHALMOLOGY

## 2024-08-20 PROCEDURE — 92134 CPTRZ OPH DX IMG PST SGM RTA: CPT | Performed by: OPHTHALMOLOGY

## 2024-08-23 ENCOUNTER — RX RENEWAL (OUTPATIENT)
Age: 79
End: 2024-08-23

## 2024-08-26 ENCOUNTER — ASC (OUTPATIENT)
Dept: URBAN - METROPOLITAN AREA SURGERY 8 | Facility: SURGERY | Age: 79
Setting detail: OPHTHALMOLOGY
End: 2024-08-26
Payer: MEDICARE

## 2024-08-26 DIAGNOSIS — H25.11: ICD-10-CM

## 2024-08-26 PROCEDURE — 68841 INSJ RX ELUT IMPLT LAC CANAL: CPT | Mod: RT | Performed by: OPHTHALMOLOGY

## 2024-08-26 PROCEDURE — 66984 XCAPSL CTRC RMVL W/O ECP: CPT | Mod: RT | Performed by: OPHTHALMOLOGY

## 2024-08-27 ENCOUNTER — RX ONLY (RX ONLY)
Age: 79
End: 2024-08-27

## 2024-08-27 ENCOUNTER — OFFICE (OUTPATIENT)
Facility: LOCATION | Age: 79
Setting detail: OPHTHALMOLOGY
End: 2024-08-27
Payer: MEDICARE

## 2024-08-27 DIAGNOSIS — Z96.1: ICD-10-CM

## 2024-08-27 DIAGNOSIS — H25.12: ICD-10-CM

## 2024-08-27 PROBLEM — H25.13 CATARACT SENILE NUCLEAR SCLEROSIS; RIGHT EYE, LEFT EYE, BOTH EYES: Status: ACTIVE | Noted: 2024-08-20

## 2024-08-27 PROBLEM — H25.11 CATARACT SENILE NUCLEAR SCLEROSIS; RIGHT EYE, LEFT EYE, BOTH EYES: Status: ACTIVE | Noted: 2024-08-20

## 2024-08-27 PROCEDURE — 99024 POSTOP FOLLOW-UP VISIT: CPT | Performed by: OPHTHALMOLOGY

## 2024-08-27 ASSESSMENT — CONFRONTATIONAL VISUAL FIELD TEST (CVF)
OS_FINDINGS: FULL
OD_FINDINGS: FULL

## 2024-09-03 ENCOUNTER — OFFICE (OUTPATIENT)
Facility: LOCATION | Age: 79
Setting detail: OPHTHALMOLOGY
End: 2024-09-03
Payer: MEDICARE

## 2024-09-03 DIAGNOSIS — Z96.1: ICD-10-CM

## 2024-09-03 PROCEDURE — 99024 POSTOP FOLLOW-UP VISIT: CPT | Performed by: OPHTHALMOLOGY

## 2024-09-03 ASSESSMENT — CONFRONTATIONAL VISUAL FIELD TEST (CVF)
OS_FINDINGS: FULL
OD_FINDINGS: FULL

## 2024-09-11 ENCOUNTER — APPOINTMENT (OUTPATIENT)
Dept: NEPHROLOGY | Facility: CLINIC | Age: 79
End: 2024-09-11

## 2024-09-11 ENCOUNTER — OFFICE (OUTPATIENT)
Facility: LOCATION | Age: 79
Setting detail: OPHTHALMOLOGY
End: 2024-09-11
Payer: MEDICARE

## 2024-09-11 DIAGNOSIS — Z96.1: ICD-10-CM

## 2024-09-11 PROCEDURE — 99024 POSTOP FOLLOW-UP VISIT: CPT | Performed by: OPHTHALMOLOGY

## 2024-09-18 ENCOUNTER — RX RENEWAL (OUTPATIENT)
Age: 79
End: 2024-09-18

## 2024-09-19 ENCOUNTER — NON-APPOINTMENT (OUTPATIENT)
Age: 79
End: 2024-09-19

## 2024-09-25 ENCOUNTER — OFFICE (OUTPATIENT)
Facility: LOCATION | Age: 79
Setting detail: OPHTHALMOLOGY
End: 2024-09-25
Payer: MEDICARE

## 2024-09-25 DIAGNOSIS — Z96.1: ICD-10-CM

## 2024-09-25 PROCEDURE — 99024 POSTOP FOLLOW-UP VISIT: CPT | Performed by: OPHTHALMOLOGY

## 2024-10-01 ENCOUNTER — LABORATORY RESULT (OUTPATIENT)
Age: 79
End: 2024-10-01

## 2024-10-01 ENCOUNTER — APPOINTMENT (OUTPATIENT)
Dept: INTERNAL MEDICINE | Facility: CLINIC | Age: 79
End: 2024-10-01
Payer: MEDICARE

## 2024-10-01 VITALS
WEIGHT: 164 LBS | BODY MASS INDEX: 27.32 KG/M2 | HEIGHT: 65 IN | SYSTOLIC BLOOD PRESSURE: 152 MMHG | DIASTOLIC BLOOD PRESSURE: 82 MMHG | HEART RATE: 70 BPM | OXYGEN SATURATION: 98 %

## 2024-10-01 DIAGNOSIS — J45.909 UNSPECIFIED ASTHMA, UNCOMPLICATED: ICD-10-CM

## 2024-10-01 DIAGNOSIS — K21.9 GASTRO-ESOPHAGEAL REFLUX DISEASE W/OUT ESOPHAGITIS: ICD-10-CM

## 2024-10-01 DIAGNOSIS — H40.9 UNSPECIFIED GLAUCOMA: ICD-10-CM

## 2024-10-01 DIAGNOSIS — R35.0 FREQUENCY OF MICTURITION: ICD-10-CM

## 2024-10-01 DIAGNOSIS — E78.5 HYPERLIPIDEMIA, UNSPECIFIED: ICD-10-CM

## 2024-10-01 DIAGNOSIS — E11.9 TYPE 2 DIABETES MELLITUS W/OUT COMPLICATIONS: ICD-10-CM

## 2024-10-01 DIAGNOSIS — C64.9 MALIGNANT NEOPLASM OF UNSPECIFIED KIDNEY, EXCEPT RENAL PELVIS: ICD-10-CM

## 2024-10-01 DIAGNOSIS — R06.02 SHORTNESS OF BREATH: ICD-10-CM

## 2024-10-01 PROCEDURE — 99214 OFFICE O/P EST MOD 30 MIN: CPT

## 2024-10-01 PROCEDURE — G2211 COMPLEX E/M VISIT ADD ON: CPT

## 2024-10-01 PROCEDURE — 36415 COLL VENOUS BLD VENIPUNCTURE: CPT

## 2024-10-01 NOTE — HISTORY OF PRESENT ILLNESS
[Spouse] : spouse [FreeTextEntry1] : F/u exam [de-identified] : Mr. SHERWIN MINOR 78 year male with a PMH HTN, uncontrolled DM2, CRI, has problem with memory, h/o renal cell carcinoma, glaucoma, h/o TIA present to the office to a f/u. Patient feels OK, c/o urine incontinence. Wear diapers, needs rx to get it from the pharmacy. 3 weeks ago had R eye cataract surgery, do not see much improvement of the symptoms, has a f/u appointment tomorrow .  Wants to do a blood test(wants to know his blood type). Did flu vaccine on 09/25/24 Seen by a neurologist for  a memory, start patient on donepezil 5 mg qd

## 2024-10-01 NOTE — REVIEW OF SYSTEMS
[Negative] : Respiratory [FreeTextEntry3] : has blurry vision on/off [FreeTextEntry8] : C/o urine incontinence

## 2024-10-01 NOTE — PLAN
[FreeTextEntry1] : Mr. SHERWIN MINOR 78 year male with a PMH HTN, uncontrolled DM2, CRI, has problem with memory, h/o renal cell carcinoma, glaucoma, h/o TIA present to the office to a f/u. F/u exam is performed. Recommend  to do a blood test today, further management will depend on the blood test results.   DM2, recommend strict low carb diet, be compliant with DM2 meds, continue humalog, lantus, trulicity f/u with endocrinologist HTN, continue present meds norvasc, metoprolol, hydralazine asa H/o TIA continue plavix Hyperlipidemia continue lipitor 80 mg qhs. BPH continue flomax f/u with urologist CRI continue  sodium bicarbonate, Glaucoma continue xalatan, f/u with ophtalmologist Urine incontinence - do u/a, f/u with urologist. Rx for a adult diapers was provided to the patient F/u with endo, nephrologist, urologist.   RTC to f/u in 2 wks. Patient is verbalized understanding

## 2024-10-02 LAB
ABO + RH PNL BLD: NORMAL
ALBUMIN SERPL ELPH-MCNC: 3.9 G/DL
ALP BLD-CCNC: 110 U/L
ALT SERPL-CCNC: 18 U/L
ANION GAP SERPL CALC-SCNC: 10 MMOL/L
APPEARANCE: CLEAR
AST SERPL-CCNC: 14 U/L
BILIRUB SERPL-MCNC: 0.2 MG/DL
BILIRUBIN URINE: NEGATIVE
BLOOD URINE: NEGATIVE
BUN SERPL-MCNC: 23 MG/DL
CALCIUM SERPL-MCNC: 9.8 MG/DL
CHLORIDE SERPL-SCNC: 104 MMOL/L
CHOLEST SERPL-MCNC: 141 MG/DL
CO2 SERPL-SCNC: 24 MMOL/L
COLOR: YELLOW
CREAT SERPL-MCNC: 1.86 MG/DL
EGFR: 37 ML/MIN/1.73M2
ESTIMATED AVERAGE GLUCOSE: 177 MG/DL
GLUCOSE QUALITATIVE U: 100 MG/DL
GLUCOSE SERPL-MCNC: 211 MG/DL
HBA1C MFR BLD HPLC: 7.8 %
HCT VFR BLD CALC: 42 %
HDLC SERPL-MCNC: 36 MG/DL
HGB BLD-MCNC: 13.4 G/DL
KETONES URINE: NEGATIVE MG/DL
LDLC SERPL CALC-MCNC: 74 MG/DL
LEUKOCYTE ESTERASE URINE: NEGATIVE
MCHC RBC-ENTMCNC: 28.6 PG
MCHC RBC-ENTMCNC: 31.9 GM/DL
MCV RBC AUTO: 89.7 FL
NITRITE URINE: NEGATIVE
NONHDLC SERPL-MCNC: 105 MG/DL
PH URINE: 6.5
PLATELET # BLD AUTO: 204 K/UL
POTASSIUM SERPL-SCNC: 4.6 MMOL/L
PROT SERPL-MCNC: 6.5 G/DL
PROTEIN URINE: 100 MG/DL
RBC # BLD: 4.68 M/UL
RBC # FLD: 14 %
SODIUM SERPL-SCNC: 138 MMOL/L
SPECIFIC GRAVITY URINE: 1.01
TRIGL SERPL-MCNC: 182 MG/DL
TSH SERPL-ACNC: 1.54 UIU/ML
UROBILINOGEN URINE: 0.2 MG/DL
WBC # FLD AUTO: 8.42 K/UL

## 2024-10-08 ENCOUNTER — APPOINTMENT (OUTPATIENT)
Dept: NEPHROLOGY | Facility: CLINIC | Age: 79
End: 2024-10-08
Payer: MEDICARE

## 2024-10-08 VITALS — SYSTOLIC BLOOD PRESSURE: 140 MMHG | DIASTOLIC BLOOD PRESSURE: 80 MMHG

## 2024-10-08 VITALS
HEART RATE: 80 BPM | HEIGHT: 65 IN | WEIGHT: 165.35 LBS | TEMPERATURE: 97.7 F | BODY MASS INDEX: 27.55 KG/M2 | SYSTOLIC BLOOD PRESSURE: 166 MMHG | DIASTOLIC BLOOD PRESSURE: 77 MMHG | OXYGEN SATURATION: 99 %

## 2024-10-08 DIAGNOSIS — I10 ESSENTIAL (PRIMARY) HYPERTENSION: ICD-10-CM

## 2024-10-08 DIAGNOSIS — N18.32 CHRONIC KIDNEY DISEASE, STAGE 3B: ICD-10-CM

## 2024-10-08 PROCEDURE — 99214 OFFICE O/P EST MOD 30 MIN: CPT

## 2024-10-08 PROCEDURE — G2211 COMPLEX E/M VISIT ADD ON: CPT

## 2024-10-11 ENCOUNTER — APPOINTMENT (OUTPATIENT)
Dept: ENDOCRINOLOGY | Facility: CLINIC | Age: 79
End: 2024-10-11

## 2024-10-15 ENCOUNTER — OFFICE (OUTPATIENT)
Facility: LOCATION | Age: 79
Setting detail: OPHTHALMOLOGY
End: 2024-10-15
Payer: MEDICARE

## 2024-10-15 DIAGNOSIS — E13.3312: ICD-10-CM

## 2024-10-15 DIAGNOSIS — H35.372: ICD-10-CM

## 2024-10-15 PROCEDURE — 67028 INJECTION EYE DRUG: CPT | Mod: 79,LT | Performed by: OPHTHALMOLOGY

## 2024-10-15 PROCEDURE — 92134 CPTRZ OPH DX IMG PST SGM RTA: CPT | Performed by: OPHTHALMOLOGY

## 2024-10-15 ASSESSMENT — REFRACTION_AUTOREFRACTION
OS_CYLINDER: -3.00
OD_AXIS: 075
OD_SPHERE: +0.75
OS_AXIS: 085
OD_CYLINDER: -2.00
OS_SPHERE: +2.00

## 2024-10-15 ASSESSMENT — VISUAL ACUITY
OD_BCVA: 20/125-1
OS_BCVA: 20/30-2

## 2024-10-15 ASSESSMENT — KERATOMETRY
OD_AXISANGLE_DEGREES: 060
OS_K2POWER_DIOPTERS: 41.50
OD_K1POWER_DIOPTERS: 43.50
OS_AXISANGLE_DEGREES: 085
OD_K2POWER_DIOPTERS: 42.25
OS_K1POWER_DIOPTERS: 43.25

## 2024-10-15 ASSESSMENT — CORNEAL EDEMA CLINICAL DESCRIPTION: OD_CORNEALEDEMA: T

## 2024-10-24 ENCOUNTER — APPOINTMENT (OUTPATIENT)
Dept: INTERNAL MEDICINE | Facility: CLINIC | Age: 79
End: 2024-10-24
Payer: MEDICARE

## 2024-10-24 ENCOUNTER — NON-APPOINTMENT (OUTPATIENT)
Age: 79
End: 2024-10-24

## 2024-10-24 VITALS
OXYGEN SATURATION: 97 % | HEIGHT: 65 IN | WEIGHT: 165 LBS | DIASTOLIC BLOOD PRESSURE: 89 MMHG | BODY MASS INDEX: 27.49 KG/M2 | SYSTOLIC BLOOD PRESSURE: 160 MMHG | HEART RATE: 65 BPM

## 2024-10-24 VITALS — SYSTOLIC BLOOD PRESSURE: 138 MMHG | DIASTOLIC BLOOD PRESSURE: 62 MMHG

## 2024-10-24 DIAGNOSIS — Z01.818 ENCOUNTER FOR OTHER PREPROCEDURAL EXAMINATION: ICD-10-CM

## 2024-10-24 DIAGNOSIS — R41.3 OTHER AMNESIA: ICD-10-CM

## 2024-10-24 PROCEDURE — 93000 ELECTROCARDIOGRAM COMPLETE: CPT

## 2024-10-24 PROCEDURE — 99213 OFFICE O/P EST LOW 20 MIN: CPT

## 2024-10-25 ENCOUNTER — RX RENEWAL (OUTPATIENT)
Age: 79
End: 2024-10-25

## 2024-10-28 ENCOUNTER — ASC (OUTPATIENT)
Dept: URBAN - METROPOLITAN AREA SURGERY 8 | Facility: SURGERY | Age: 79
Setting detail: OPHTHALMOLOGY
End: 2024-10-28
Payer: MEDICARE

## 2024-10-28 ENCOUNTER — APPOINTMENT (OUTPATIENT)
Dept: ENDOCRINOLOGY | Facility: CLINIC | Age: 79
End: 2024-10-28

## 2024-10-28 DIAGNOSIS — H25.12: ICD-10-CM

## 2024-10-28 PROCEDURE — 66984 XCAPSL CTRC RMVL W/O ECP: CPT | Mod: 79,LT | Performed by: OPHTHALMOLOGY

## 2024-10-28 PROCEDURE — 68841 INSJ RX ELUT IMPLT LAC CANAL: CPT | Mod: 79,LT | Performed by: OPHTHALMOLOGY

## 2024-10-29 ENCOUNTER — OFFICE (OUTPATIENT)
Facility: LOCATION | Age: 79
Setting detail: OPHTHALMOLOGY
End: 2024-10-29
Payer: MEDICARE

## 2024-10-29 ENCOUNTER — RX ONLY (RX ONLY)
Age: 79
End: 2024-10-29

## 2024-10-29 DIAGNOSIS — Z96.1: ICD-10-CM

## 2024-10-29 PROCEDURE — 99024 POSTOP FOLLOW-UP VISIT: CPT | Performed by: OPHTHALMOLOGY

## 2024-10-29 ASSESSMENT — REFRACTION_AUTOREFRACTION
OS_AXIS: 084
OD_SPHERE: -0.25
OD_CYLINDER: -1.25
OS_SPHERE: +0.50
OD_AXIS: 058
OS_CYLINDER: -2.50

## 2024-10-29 ASSESSMENT — KERATOMETRY
OS_AXISANGLE_DEGREES: 175
OD_K2POWER_DIOPTERS: 43.50
OD_K1POWER_DIOPTERS: 41.75
OS_K2POWER_DIOPTERS: 43.75
OD_AXISANGLE_DEGREES: 165
OS_K1POWER_DIOPTERS: 42.00

## 2024-10-29 ASSESSMENT — VISUAL ACUITY
OD_BCVA: 20/60+2
OS_BCVA: 20/25-

## 2024-10-29 ASSESSMENT — TONOMETRY
OD_IOP_MMHG: 20
OS_IOP_MMHG: 20

## 2024-10-29 ASSESSMENT — CORNEAL EDEMA CLINICAL DESCRIPTION: OD_CORNEALEDEMA: ABSENT

## 2024-10-29 ASSESSMENT — CONFRONTATIONAL VISUAL FIELD TEST (CVF)
OS_FINDINGS: FULL
OD_FINDINGS: FULL

## 2024-10-31 ENCOUNTER — APPOINTMENT (OUTPATIENT)
Dept: ENDOCRINOLOGY | Facility: CLINIC | Age: 79
End: 2024-10-31
Payer: MEDICARE

## 2024-10-31 VITALS
HEART RATE: 51 BPM | RESPIRATION RATE: 16 BRPM | TEMPERATURE: 97.7 F | OXYGEN SATURATION: 96 % | DIASTOLIC BLOOD PRESSURE: 80 MMHG | HEIGHT: 65 IN | BODY MASS INDEX: 27.49 KG/M2 | WEIGHT: 165 LBS | SYSTOLIC BLOOD PRESSURE: 158 MMHG

## 2024-10-31 DIAGNOSIS — E11.65 TYPE 2 DIABETES MELLITUS WITH HYPERGLYCEMIA: ICD-10-CM

## 2024-10-31 DIAGNOSIS — E78.5 HYPERLIPIDEMIA, UNSPECIFIED: ICD-10-CM

## 2024-10-31 DIAGNOSIS — E83.52 HYPERCALCEMIA: ICD-10-CM

## 2024-10-31 DIAGNOSIS — I10 ESSENTIAL (PRIMARY) HYPERTENSION: ICD-10-CM

## 2024-10-31 LAB — GLUCOSE BLDC GLUCOMTR-MCNC: 226

## 2024-10-31 PROCEDURE — G2211 COMPLEX E/M VISIT ADD ON: CPT

## 2024-10-31 PROCEDURE — 95251 CONT GLUC MNTR ANALYSIS I&R: CPT

## 2024-10-31 PROCEDURE — 99214 OFFICE O/P EST MOD 30 MIN: CPT

## 2024-10-31 PROCEDURE — 82962 GLUCOSE BLOOD TEST: CPT

## 2024-10-31 RX ORDER — TIRZEPATIDE 5 MG/.5ML
5 INJECTION, SOLUTION SUBCUTANEOUS
Qty: 3 | Refills: 1 | Status: ACTIVE | COMMUNITY
Start: 2024-10-31 | End: 1900-01-01

## 2024-11-05 ENCOUNTER — OFFICE (OUTPATIENT)
Facility: LOCATION | Age: 79
Setting detail: OPHTHALMOLOGY
End: 2024-11-05
Payer: MEDICARE

## 2024-11-05 DIAGNOSIS — Z96.1: ICD-10-CM

## 2024-11-05 PROCEDURE — 99024 POSTOP FOLLOW-UP VISIT: CPT | Performed by: OPHTHALMOLOGY

## 2024-11-05 ASSESSMENT — REFRACTION_AUTOREFRACTION
OS_AXIS: 080
OS_SPHERE: +1.00
OS_CYLINDER: -2.25
OD_SPHERE: 0.00
OD_AXIS: 065
OD_CYLINDER: -2.00

## 2024-11-05 ASSESSMENT — REFRACTION_CURRENTRX
OS_SPHERE: PLANO
OD_SPHERE: +0.25
OD_ADD: +2.50
OS_CYLINDER: -1.75
OD_OVR_VA: 20/
OD_CYLINDER: -1.75
OS_ADD: +2.50
OS_VPRISM_DIRECTION: BF
OS_AXIS: 103
OD_VPRISM_DIRECTION: BF
OD_AXIS: 079
OS_OVR_VA: 20/

## 2024-11-05 ASSESSMENT — KERATOMETRY
OS_AXISANGLE_DEGREES: 085
OS_K1POWER_DIOPTERS: 43.00
OS_K2POWER_DIOPTERS: 41.50
OD_K1POWER_DIOPTERS: 43.50
OD_AXISANGLE_DEGREES: 060
OD_K2POWER_DIOPTERS: 42.00

## 2024-11-05 ASSESSMENT — TONOMETRY
OD_IOP_MMHG: 14
OS_IOP_MMHG: 14

## 2024-11-05 ASSESSMENT — CONFRONTATIONAL VISUAL FIELD TEST (CVF)
OS_FINDINGS: FULL
OD_FINDINGS: FULL

## 2024-11-05 ASSESSMENT — CORNEAL EDEMA CLINICAL DESCRIPTION: OD_CORNEALEDEMA: ABSENT

## 2024-11-05 ASSESSMENT — VISUAL ACUITY
OS_BCVA: 20/40-2
OD_BCVA: 20/60+1

## 2024-11-05 NOTE — ED PROVIDER NOTE - RADIATION
no radiation Bactrim Counseling:  I discussed with the patient the risks of sulfa antibiotics including but not limited to GI upset, allergic reaction, drug rash, diarrhea, dizziness, photosensitivity, and yeast infections.  Rarely, more serious reactions can occur including but not limited to aplastic anemia, agranulocytosis, methemoglobinemia, blood dyscrasias, liver or kidney failure, lung infiltrates or desquamative/blistering drug rashes.

## 2024-11-06 NOTE — ED ADULT NURSE NOTE - SUICIDE SCREENING QUESTION 2
Previously on levothyroxine, has not been taking  TSH elevated, T4 within normal limits  Endocrinology following  Restarted on levothyroxine 25 mcg daily  Will need repeat labs in 6-8 weeks  Follow up with PCP/endocrinology outpatient   No

## 2024-11-08 DIAGNOSIS — S62.109A FRACTURE OF UNSPECIFIED CARPAL BONE, UNSPECIFIED WRIST, INITIAL ENCOUNTER FOR CLOSED FRACTURE: ICD-10-CM

## 2024-11-26 ENCOUNTER — OFFICE (OUTPATIENT)
Facility: LOCATION | Age: 79
Setting detail: OPHTHALMOLOGY
End: 2024-11-26
Payer: MEDICARE

## 2024-11-26 DIAGNOSIS — Z96.1: ICD-10-CM

## 2024-11-26 PROCEDURE — 99024 POSTOP FOLLOW-UP VISIT: CPT | Performed by: OPHTHALMOLOGY

## 2024-11-26 ASSESSMENT — REFRACTION_AUTOREFRACTION
OD_SPHERE: +0.75
OS_AXIS: 081
OD_AXIS: 074
OS_SPHERE: 0.00
OS_CYLINDER: -2.25
OD_CYLINDER: -2.25

## 2024-11-26 ASSESSMENT — KERATOMETRY
OD_K2POWER_DIOPTERS: 44.00
OS_K2POWER_DIOPTERS: 43.75
OS_K1POWER_DIOPTERS: 42.75
OD_K1POWER_DIOPTERS: 42.75
OD_AXISANGLE_DEGREES: 150
OS_AXISANGLE_DEGREES: 170

## 2024-11-26 ASSESSMENT — CORNEAL EDEMA CLINICAL DESCRIPTION
OS_CORNEALEDEMA: ABSENT
OD_CORNEALEDEMA: ABSENT

## 2024-11-26 ASSESSMENT — REFRACTION_CURRENTRX
OS_AXIS: 103
OS_SPHERE: PLANO
OD_CYLINDER: -1.75
OS_OVR_VA: 20/
OS_ADD: +2.50
OD_OVR_VA: 20/
OD_AXIS: 079
OD_SPHERE: +0.25
OS_VPRISM_DIRECTION: BF
OD_VPRISM_DIRECTION: BF
OD_ADD: +2.50
OS_CYLINDER: -1.75

## 2024-11-26 ASSESSMENT — CONFRONTATIONAL VISUAL FIELD TEST (CVF)
OS_FINDINGS: FULL
OD_FINDINGS: FULL

## 2024-11-26 ASSESSMENT — VISUAL ACUITY
OD_BCVA: 20/60-2
OS_BCVA: 20/40

## 2024-11-26 ASSESSMENT — TONOMETRY
OS_IOP_MMHG: 17
OD_IOP_MMHG: 16

## 2024-12-03 ENCOUNTER — RX RENEWAL (OUTPATIENT)
Age: 79
End: 2024-12-03

## 2024-12-10 ENCOUNTER — OFFICE (OUTPATIENT)
Facility: LOCATION | Age: 79
Setting detail: OPHTHALMOLOGY
End: 2024-12-10
Payer: MEDICARE

## 2024-12-10 DIAGNOSIS — Z96.1: ICD-10-CM

## 2024-12-10 DIAGNOSIS — H52.4: ICD-10-CM

## 2024-12-10 PROCEDURE — 92015 DETERMINE REFRACTIVE STATE: CPT | Performed by: OPHTHALMOLOGY

## 2024-12-10 PROCEDURE — 99024 POSTOP FOLLOW-UP VISIT: CPT | Performed by: OPHTHALMOLOGY

## 2024-12-10 ASSESSMENT — REFRACTION_CURRENTRX
OD_ADD: +2.50
OS_OVR_VA: 20/
OS_AXIS: 103
OS_ADD: +2.50
OD_OVR_VA: 20/
OD_AXIS: 079
OD_VPRISM_DIRECTION: BF
OD_CYLINDER: -1.75
OD_SPHERE: +0.25
OS_CYLINDER: -1.75
OS_VPRISM_DIRECTION: BF
OS_SPHERE: PLANO

## 2024-12-10 ASSESSMENT — TONOMETRY
OD_IOP_MMHG: 21
OS_IOP_MMHG: 21

## 2024-12-10 ASSESSMENT — KERATOMETRY
OS_K1POWER_DIOPTERS: 42.25
OS_K2POWER_DIOPTERS: 44.00
OS_AXISANGLE_DEGREES: 179
OD_AXISANGLE_DEGREES: 165
OD_K2POWER_DIOPTERS: 44.00
OD_K1POWER_DIOPTERS: 42.00

## 2024-12-10 ASSESSMENT — REFRACTION_AUTOREFRACTION
OD_AXIS: 077
OD_CYLINDER: -2.00
OS_SPHERE: +0.25
OS_AXIS: 082
OD_SPHERE: +0.50
OS_CYLINDER: -2.25

## 2024-12-10 ASSESSMENT — CONFRONTATIONAL VISUAL FIELD TEST (CVF)
OD_FINDINGS: FULL
OS_FINDINGS: FULL

## 2024-12-10 ASSESSMENT — REFRACTION_MANIFEST
OS_SPHERE: +0.25
OD_ADD: +2.75
OD_SPHERE: +0.50
OS_ADD: +2.75
OS_AXIS: 080
OD_AXIS: 080
OS_CYLINDER: -2.25
OD_CYLINDER: -2.00

## 2024-12-10 ASSESSMENT — VISUAL ACUITY
OS_BCVA: 20/30-2
OD_BCVA: 20/40+

## 2024-12-10 ASSESSMENT — CORNEAL EDEMA CLINICAL DESCRIPTION
OD_CORNEALEDEMA: ABSENT
OS_CORNEALEDEMA: ABSENT

## 2024-12-12 ENCOUNTER — RX RENEWAL (OUTPATIENT)
Age: 79
End: 2024-12-12

## 2024-12-17 ENCOUNTER — OFFICE (OUTPATIENT)
Facility: LOCATION | Age: 79
Setting detail: OPHTHALMOLOGY
End: 2024-12-17
Payer: MEDICARE

## 2024-12-17 DIAGNOSIS — E11.3292: ICD-10-CM

## 2024-12-17 DIAGNOSIS — E13.3312: ICD-10-CM

## 2024-12-17 PROCEDURE — 67028 INJECTION EYE DRUG: CPT | Mod: 79,LT | Performed by: OPHTHALMOLOGY

## 2024-12-17 PROCEDURE — 92134 CPTRZ OPH DX IMG PST SGM RTA: CPT | Performed by: OPHTHALMOLOGY

## 2024-12-17 ASSESSMENT — CORNEAL EDEMA CLINICAL DESCRIPTION
OD_CORNEALEDEMA: ABSENT
OS_CORNEALEDEMA: ABSENT

## 2024-12-18 ASSESSMENT — REFRACTION_MANIFEST
OD_AXIS: 080
OD_ADD: +2.75
OD_CYLINDER: -2.00
OS_CYLINDER: -2.25
OS_AXIS: 080
OS_ADD: +2.75
OS_SPHERE: +0.25
OD_SPHERE: +0.50

## 2024-12-18 ASSESSMENT — KERATOMETRY
OS_K1POWER_DIOPTERS: 42.25
OS_K2POWER_DIOPTERS: 44.00
OD_K1POWER_DIOPTERS: 42.00
OS_AXISANGLE_DEGREES: 179
OD_AXISANGLE_DEGREES: 165
OD_K2POWER_DIOPTERS: 44.00

## 2024-12-18 ASSESSMENT — REFRACTION_AUTOREFRACTION
OD_AXIS: 077
OS_AXIS: 082
OD_SPHERE: +0.50
OS_CYLINDER: -2.25
OD_CYLINDER: -2.00
OS_SPHERE: +0.25

## 2024-12-18 ASSESSMENT — REFRACTION_CURRENTRX
OD_OVR_VA: 20/
OS_OVR_VA: 20/
OS_AXIS: 103
OS_CYLINDER: -1.75
OD_ADD: +2.50
OS_SPHERE: PLANO
OS_VPRISM_DIRECTION: BF
OS_ADD: +2.50
OD_VPRISM_DIRECTION: BF
OD_CYLINDER: -1.75
OD_AXIS: 079
OD_SPHERE: +0.25

## 2024-12-18 ASSESSMENT — VISUAL ACUITY
OD_BCVA: 20/40+
OS_BCVA: 20/50+2

## 2025-01-09 ENCOUNTER — NON-APPOINTMENT (OUTPATIENT)
Age: 80
End: 2025-01-09

## 2025-01-09 ENCOUNTER — RX RENEWAL (OUTPATIENT)
Age: 80
End: 2025-01-09

## 2025-01-15 ENCOUNTER — APPOINTMENT (OUTPATIENT)
Dept: INTERNAL MEDICINE | Facility: CLINIC | Age: 80
End: 2025-01-15

## 2025-01-20 ENCOUNTER — APPOINTMENT (OUTPATIENT)
Dept: INTERNAL MEDICINE | Facility: CLINIC | Age: 80
End: 2025-01-20
Payer: MEDICARE

## 2025-01-20 VITALS
HEART RATE: 78 BPM | SYSTOLIC BLOOD PRESSURE: 140 MMHG | HEIGHT: 65 IN | OXYGEN SATURATION: 99 % | BODY MASS INDEX: 27.32 KG/M2 | DIASTOLIC BLOOD PRESSURE: 78 MMHG | WEIGHT: 164 LBS

## 2025-01-20 DIAGNOSIS — E83.52 HYPERCALCEMIA: ICD-10-CM

## 2025-01-20 DIAGNOSIS — R41.3 OTHER AMNESIA: ICD-10-CM

## 2025-01-20 DIAGNOSIS — F32.A DEPRESSION, UNSPECIFIED: ICD-10-CM

## 2025-01-20 DIAGNOSIS — I10 ESSENTIAL (PRIMARY) HYPERTENSION: ICD-10-CM

## 2025-01-20 DIAGNOSIS — N18.32 CHRONIC KIDNEY DISEASE, STAGE 3B: ICD-10-CM

## 2025-01-20 DIAGNOSIS — E11.9 TYPE 2 DIABETES MELLITUS W/OUT COMPLICATIONS: ICD-10-CM

## 2025-01-20 DIAGNOSIS — R05.9 COUGH, UNSPECIFIED: ICD-10-CM

## 2025-01-20 DIAGNOSIS — J45.909 UNSPECIFIED ASTHMA, UNCOMPLICATED: ICD-10-CM

## 2025-01-20 DIAGNOSIS — R32 UNSPECIFIED URINARY INCONTINENCE: ICD-10-CM

## 2025-01-20 DIAGNOSIS — E78.5 HYPERLIPIDEMIA, UNSPECIFIED: ICD-10-CM

## 2025-01-20 PROCEDURE — 99214 OFFICE O/P EST MOD 30 MIN: CPT

## 2025-01-20 PROCEDURE — G2211 COMPLEX E/M VISIT ADD ON: CPT

## 2025-01-20 RX ORDER — ESCITALOPRAM OXALATE 10 MG/1
10 TABLET ORAL
Qty: 30 | Refills: 3 | Status: ACTIVE | COMMUNITY
Start: 2025-01-20 | End: 1900-01-01

## 2025-01-20 RX ORDER — AZITHROMYCIN 250 MG/1
250 TABLET, FILM COATED ORAL
Qty: 1 | Refills: 0 | Status: ACTIVE | COMMUNITY
Start: 2025-01-20 | End: 1900-01-01

## 2025-01-20 RX ORDER — BENZONATATE 100 MG/1
100 CAPSULE ORAL 3 TIMES DAILY
Qty: 30 | Refills: 0 | Status: ACTIVE | COMMUNITY
Start: 2025-01-20 | End: 1900-01-01

## 2025-02-05 DIAGNOSIS — R32 UNSPECIFIED URINARY INCONTINENCE: ICD-10-CM

## 2025-02-05 DIAGNOSIS — R35.0 FREQUENCY OF MICTURITION: ICD-10-CM

## 2025-02-06 LAB
APPEARANCE: ABNORMAL
BACTERIA: NEGATIVE /HPF
BILIRUBIN URINE: NEGATIVE
BLOOD URINE: ABNORMAL
CAST: 0 /LPF
COLOR: YELLOW
EPITHELIAL CELLS: 3 /HPF
GLUCOSE QUALITATIVE U: NEGATIVE MG/DL
KETONES URINE: NEGATIVE MG/DL
LEUKOCYTE ESTERASE URINE: ABNORMAL
MICROSCOPIC-UA: NORMAL
NITRITE URINE: NEGATIVE
PH URINE: 6.5
PROTEIN URINE: 300 MG/DL
RED BLOOD CELLS URINE: 2 /HPF
REVIEW: NORMAL
SPECIFIC GRAVITY URINE: 1.02
UROBILINOGEN URINE: 0.2 MG/DL
WHITE BLOOD CELLS URINE: 25 /HPF

## 2025-02-10 ENCOUNTER — RX RENEWAL (OUTPATIENT)
Age: 80
End: 2025-02-10

## 2025-02-13 DIAGNOSIS — N39.0 URINARY TRACT INFECTION, SITE NOT SPECIFIED: ICD-10-CM

## 2025-02-13 LAB — BACTERIA UR CULT: ABNORMAL

## 2025-02-13 RX ORDER — CEFUROXIME AXETIL 500 MG/1
500 TABLET, FILM COATED ORAL
Qty: 20 | Refills: 0 | Status: ACTIVE | COMMUNITY
Start: 2025-02-13 | End: 1900-01-01

## 2025-02-17 ENCOUNTER — NON-APPOINTMENT (OUTPATIENT)
Age: 80
End: 2025-02-17

## 2025-02-17 ENCOUNTER — RX RENEWAL (OUTPATIENT)
Age: 80
End: 2025-02-17

## 2025-02-27 ENCOUNTER — APPOINTMENT (OUTPATIENT)
Dept: UROLOGY | Facility: CLINIC | Age: 80
End: 2025-02-27
Payer: MEDICARE

## 2025-02-27 VITALS
BODY MASS INDEX: 26.82 KG/M2 | HEIGHT: 65 IN | OXYGEN SATURATION: 97 % | DIASTOLIC BLOOD PRESSURE: 83 MMHG | RESPIRATION RATE: 16 BRPM | HEART RATE: 76 BPM | WEIGHT: 161 LBS | SYSTOLIC BLOOD PRESSURE: 128 MMHG

## 2025-02-27 DIAGNOSIS — N40.1 BENIGN PROSTATIC HYPERPLASIA WITH LOWER URINARY TRACT SYMPMS: ICD-10-CM

## 2025-02-27 DIAGNOSIS — C64.9 MALIGNANT NEOPLASM OF UNSPECIFIED KIDNEY, EXCEPT RENAL PELVIS: ICD-10-CM

## 2025-02-27 DIAGNOSIS — N39.0 URINARY TRACT INFECTION, SITE NOT SPECIFIED: ICD-10-CM

## 2025-02-27 DIAGNOSIS — R32 UNSPECIFIED URINARY INCONTINENCE: ICD-10-CM

## 2025-02-27 DIAGNOSIS — Z85.528 PERSONAL HISTORY OF OTHER MALIGNANT NEOPLASM OF KIDNEY: ICD-10-CM

## 2025-02-27 DIAGNOSIS — R97.20 ELEVATED PROSTATE, SPECIFIC ANTIGEN [PSA]: ICD-10-CM

## 2025-02-27 DIAGNOSIS — R35.0 FREQUENCY OF MICTURITION: ICD-10-CM

## 2025-02-27 PROCEDURE — 51798 US URINE CAPACITY MEASURE: CPT

## 2025-02-27 PROCEDURE — 99214 OFFICE O/P EST MOD 30 MIN: CPT

## 2025-03-03 LAB — PSA SERPL-MCNC: 2.08 NG/ML

## 2025-03-06 ENCOUNTER — APPOINTMENT (OUTPATIENT)
Dept: ENDOCRINOLOGY | Facility: CLINIC | Age: 80
End: 2025-03-06
Payer: MEDICARE

## 2025-03-06 VITALS
WEIGHT: 160 LBS | BODY MASS INDEX: 26.66 KG/M2 | OXYGEN SATURATION: 95 % | SYSTOLIC BLOOD PRESSURE: 127 MMHG | HEART RATE: 94 BPM | DIASTOLIC BLOOD PRESSURE: 84 MMHG | RESPIRATION RATE: 16 BRPM | HEIGHT: 65 IN

## 2025-03-06 DIAGNOSIS — E83.52 HYPERCALCEMIA: ICD-10-CM

## 2025-03-06 DIAGNOSIS — E11.65 TYPE 2 DIABETES MELLITUS WITH HYPERGLYCEMIA: ICD-10-CM

## 2025-03-06 DIAGNOSIS — E87.5 HYPERKALEMIA: ICD-10-CM

## 2025-03-06 DIAGNOSIS — E78.5 HYPERLIPIDEMIA, UNSPECIFIED: ICD-10-CM

## 2025-03-06 DIAGNOSIS — I10 ESSENTIAL (PRIMARY) HYPERTENSION: ICD-10-CM

## 2025-03-06 LAB — GLUCOSE BLDC GLUCOMTR-MCNC: 92

## 2025-03-06 PROCEDURE — 36415 COLL VENOUS BLD VENIPUNCTURE: CPT

## 2025-03-06 PROCEDURE — 99214 OFFICE O/P EST MOD 30 MIN: CPT

## 2025-03-06 PROCEDURE — G2211 COMPLEX E/M VISIT ADD ON: CPT

## 2025-03-10 ENCOUNTER — NON-APPOINTMENT (OUTPATIENT)
Age: 80
End: 2025-03-10

## 2025-03-11 ENCOUNTER — TRANSCRIPTION ENCOUNTER (OUTPATIENT)
Age: 80
End: 2025-03-11

## 2025-03-11 LAB
ALBUMIN SERPL ELPH-MCNC: 4.1 G/DL
ALP BLD-CCNC: 113 U/L
ALT SERPL-CCNC: 16 U/L
ANION GAP SERPL CALC-SCNC: 12 MMOL/L
AST SERPL-CCNC: 15 U/L
BILIRUB SERPL-MCNC: 0.6 MG/DL
BUN SERPL-MCNC: 26 MG/DL
CALCIUM SERPL-MCNC: 10.4 MG/DL
CHLORIDE SERPL-SCNC: 106 MMOL/L
CHOLEST SERPL-MCNC: 138 MG/DL
CO2 SERPL-SCNC: 22 MMOL/L
CREAT SERPL-MCNC: 1.89 MG/DL
CREAT SPEC-SCNC: 137 MG/DL
EGFRCR SERPLBLD CKD-EPI 2021: 36 ML/MIN/1.73M2
ESTIMATED AVERAGE GLUCOSE: 177 MG/DL
GLUCOSE SERPL-MCNC: 84 MG/DL
HBA1C MFR BLD HPLC: 7.8 %
HDLC SERPL-MCNC: 34 MG/DL
LDLC SERPL CALC-MCNC: 89 MG/DL
MICROALBUMIN 24H UR DL<=1MG/L-MCNC: 62 MG/DL
MICROALBUMIN/CREAT 24H UR-RTO: 453 MG/G
NONHDLC SERPL-MCNC: 104 MG/DL
POTASSIUM SERPL-SCNC: 4.5 MMOL/L
POTASSIUM SERPL-SCNC: 4.6 MMOL/L
PROT SERPL-MCNC: 6.5 G/DL
SODIUM SERPL-SCNC: 139 MMOL/L
T4 FREE SERPL-MCNC: 1.5 NG/DL
TRIGL SERPL-MCNC: 75 MG/DL
TSH SERPL-ACNC: 1.71 UIU/ML

## 2025-03-12 ENCOUNTER — APPOINTMENT (OUTPATIENT)
Facility: CLINIC | Age: 80
End: 2025-03-12
Payer: MEDICARE

## 2025-03-12 VITALS
SYSTOLIC BLOOD PRESSURE: 138 MMHG | WEIGHT: 159 LBS | HEART RATE: 58 BPM | DIASTOLIC BLOOD PRESSURE: 66 MMHG | OXYGEN SATURATION: 97 % | BODY MASS INDEX: 26.49 KG/M2 | HEIGHT: 65 IN

## 2025-03-12 PROCEDURE — 99203 OFFICE O/P NEW LOW 30 MIN: CPT

## 2025-03-12 RX ORDER — POLYETHYLENE GLYCOL 3350 AND ELECTROLYTES WITH LEMON FLAVOR 236; 22.74; 6.74; 5.86; 2.97 G/4L; G/4L; G/4L; G/4L; G/4L
236 POWDER, FOR SOLUTION ORAL
Qty: 1 | Refills: 0 | Status: ACTIVE | COMMUNITY
Start: 2025-03-12 | End: 1900-01-01

## 2025-03-18 ENCOUNTER — RX RENEWAL (OUTPATIENT)
Age: 80
End: 2025-03-18

## 2025-03-18 ENCOUNTER — OFFICE (OUTPATIENT)
Facility: LOCATION | Age: 80
Setting detail: OPHTHALMOLOGY
End: 2025-03-18
Payer: MEDICARE

## 2025-03-18 DIAGNOSIS — H35.372: ICD-10-CM

## 2025-03-18 DIAGNOSIS — E13.3312: ICD-10-CM

## 2025-03-18 PROCEDURE — 67028 INJECTION EYE DRUG: CPT | Mod: LT | Performed by: OPHTHALMOLOGY

## 2025-03-18 PROCEDURE — 92134 CPTRZ OPH DX IMG PST SGM RTA: CPT | Performed by: OPHTHALMOLOGY

## 2025-03-18 ASSESSMENT — REFRACTION_CURRENTRX
OD_VPRISM_DIRECTION: BF
OS_ADD: +2.50
OS_CYLINDER: -1.75
OS_AXIS: 103
OD_AXIS: 079
OS_VPRISM_DIRECTION: BF
OD_CYLINDER: -1.75
OD_ADD: +2.50
OD_SPHERE: +0.25
OS_OVR_VA: 20/
OS_SPHERE: PLANO
OD_OVR_VA: 20/

## 2025-03-18 ASSESSMENT — REFRACTION_AUTOREFRACTION
OD_AXIS: 075
OS_AXIS: 082
OS_SPHERE: 0.00
OD_SPHERE: +0.50
OS_CYLINDER: -2.00
OD_CYLINDER: -2.00

## 2025-03-18 ASSESSMENT — REFRACTION_MANIFEST
OS_AXIS: 080
OS_CYLINDER: -2.25
OS_SPHERE: +0.25
OS_ADD: +2.75
OD_ADD: +2.75
OD_CYLINDER: -2.00
OD_SPHERE: +0.50
OD_AXIS: 080

## 2025-03-18 ASSESSMENT — KERATOMETRY
OD_K1POWER_DIOPTERS: 42.25
OS_K1POWER_DIOPTERS: 42.00
OS_K2POWER_DIOPTERS: 44.00
OD_AXISANGLE_DEGREES: 161
OS_AXISANGLE_DEGREES: 175
OD_K2POWER_DIOPTERS: 43.75

## 2025-03-18 ASSESSMENT — VISUAL ACUITY
OD_BCVA: 20/30-
OS_BCVA: 20/20-

## 2025-03-18 ASSESSMENT — CORNEAL EDEMA CLINICAL DESCRIPTION
OS_CORNEALEDEMA: ABSENT
OD_CORNEALEDEMA: ABSENT

## 2025-03-19 ENCOUNTER — APPOINTMENT (OUTPATIENT)
Dept: NEPHROLOGY | Facility: CLINIC | Age: 80
End: 2025-03-19
Payer: MEDICARE

## 2025-03-19 VITALS
BODY MASS INDEX: 26.49 KG/M2 | DIASTOLIC BLOOD PRESSURE: 75 MMHG | OXYGEN SATURATION: 96 % | HEIGHT: 65 IN | SYSTOLIC BLOOD PRESSURE: 135 MMHG | WEIGHT: 159 LBS | HEART RATE: 76 BPM | TEMPERATURE: 94 F

## 2025-03-19 DIAGNOSIS — N18.32 CHRONIC KIDNEY DISEASE, STAGE 3B: ICD-10-CM

## 2025-03-19 DIAGNOSIS — I10 ESSENTIAL (PRIMARY) HYPERTENSION: ICD-10-CM

## 2025-03-19 DIAGNOSIS — E83.52 HYPERCALCEMIA: ICD-10-CM

## 2025-03-19 PROCEDURE — 99214 OFFICE O/P EST MOD 30 MIN: CPT

## 2025-03-19 PROCEDURE — G2211 COMPLEX E/M VISIT ADD ON: CPT

## 2025-03-24 ENCOUNTER — RX RENEWAL (OUTPATIENT)
Age: 80
End: 2025-03-24

## 2025-03-26 ENCOUNTER — RX RENEWAL (OUTPATIENT)
Age: 80
End: 2025-03-26

## 2025-03-31 ENCOUNTER — NON-APPOINTMENT (OUTPATIENT)
Age: 80
End: 2025-03-31

## 2025-04-10 ENCOUNTER — APPOINTMENT (OUTPATIENT)
Dept: GASTROENTEROLOGY | Facility: HOSPITAL | Age: 80
End: 2025-04-10

## 2025-04-10 ENCOUNTER — OUTPATIENT (OUTPATIENT)
Dept: OUTPATIENT SERVICES | Facility: HOSPITAL | Age: 80
LOS: 1 days | End: 2025-04-10
Payer: MEDICARE

## 2025-04-10 ENCOUNTER — RESULT REVIEW (OUTPATIENT)
Age: 80
End: 2025-04-10

## 2025-04-10 ENCOUNTER — TRANSCRIPTION ENCOUNTER (OUTPATIENT)
Age: 80
End: 2025-04-10

## 2025-04-10 VITALS
TEMPERATURE: 98 F | WEIGHT: 166.89 LBS | HEIGHT: 65 IN | DIASTOLIC BLOOD PRESSURE: 88 MMHG | SYSTOLIC BLOOD PRESSURE: 155 MMHG | HEART RATE: 80 BPM | RESPIRATION RATE: 16 BRPM | OXYGEN SATURATION: 100 %

## 2025-04-10 VITALS
HEART RATE: 62 BPM | SYSTOLIC BLOOD PRESSURE: 129 MMHG | DIASTOLIC BLOOD PRESSURE: 47 MMHG | TEMPERATURE: 98 F | OXYGEN SATURATION: 100 % | RESPIRATION RATE: 13 BRPM

## 2025-04-10 DIAGNOSIS — R52 PAIN, UNSPECIFIED: ICD-10-CM

## 2025-04-10 DIAGNOSIS — Z90.5 ACQUIRED ABSENCE OF KIDNEY: Chronic | ICD-10-CM

## 2025-04-10 DIAGNOSIS — Z12.11 ENCOUNTER FOR SCREENING FOR MALIGNANT NEOPLASM OF COLON: ICD-10-CM

## 2025-04-10 LAB — GLUCOSE BLDC GLUCOMTR-MCNC: 116 MG/DL — HIGH (ref 70–99)

## 2025-04-10 PROCEDURE — 45385 COLONOSCOPY W/LESION REMOVAL: CPT

## 2025-04-10 PROCEDURE — 88305 TISSUE EXAM BY PATHOLOGIST: CPT | Mod: 26

## 2025-04-10 RX ORDER — SODIUM CHLORIDE 9 G/1000ML
1000 INJECTION, SOLUTION INTRAVENOUS
Refills: 0 | Status: DISCONTINUED | OUTPATIENT
Start: 2025-04-10 | End: 2025-04-11

## 2025-04-10 RX ORDER — ONDANSETRON HCL/PF 4 MG/2 ML
4 VIAL (ML) INJECTION ONCE
Refills: 0 | Status: DISCONTINUED | OUTPATIENT
Start: 2025-04-10 | End: 2025-04-11

## 2025-04-10 NOTE — ASU DISCHARGE PLAN (ADULT/PEDIATRIC) - NS DC INTERPRETER YES NO
Occasional infrequent palpitations.  Continue Metoprolol 12.5 mg daily.   Follow up in one year.    Discussed possible triggers of AF/palpitations which are stress, caffeine, alcohol, inadequate sleep and dehydration and non-pharmaceutical measures to reduce symptoms she was advised to:  1.Continue magnesium supplement and add magnesium enriched foods in diet.  2.Increase hydration  3.Avoid alcohol  4.Avoid caffeine  5.Include potassium equivalent to a banana in daily diet.        
No

## 2025-04-10 NOTE — BRIEF OPERATIVE NOTE - OPERATION/FINDINGS
Final colonoscopy report - see photos in paper chart  Indication - screening, +FHx colon cancer in brother  Exam to cecum  Prep good  Withdrawal 11 minutes  Findings:  - small internal hemorrhoids  - 2mm polyp in the hepatic flexure removed with jumbo cold forceps.   - two 4mm polyps in the transverse colon, removed with cold snare.  - otherwise normal colonoscopy.     Recommend:  - resume diet  - await pathology  - repeat colonoscopy for surveillance in 5 years if in line with clinical status and goals of care  - can resume clopidogrel tomorrow; can resume Farxiga and Mounjaro today

## 2025-04-10 NOTE — ASU DISCHARGE PLAN (ADULT/PEDIATRIC) - FINANCIAL ASSISTANCE
[facility] provides services at a reduced cost to those who are determined to be eligible through [facility]’s financial assistance program. Information regarding [facility]’s financial assistance program can be found by going to [email or web address] or by calling [phone].

## 2025-04-11 LAB — SURGICAL PATHOLOGY STUDY: SIGNIFICANT CHANGE UP

## 2025-04-17 DIAGNOSIS — I12.9 HYPERTENSIVE CHRONIC KIDNEY DISEASE WITH STAGE 1 THROUGH STAGE 4 CHRONIC KIDNEY DISEASE, OR UNSPECIFIED CHRONIC KIDNEY DISEASE: ICD-10-CM

## 2025-04-17 DIAGNOSIS — Z79.85 LONG-TERM (CURRENT) USE OF INJECTABLE NON-INSULIN ANTIDIABETIC DRUGS: ICD-10-CM

## 2025-04-17 DIAGNOSIS — D12.3 BENIGN NEOPLASM OF TRANSVERSE COLON: ICD-10-CM

## 2025-04-17 DIAGNOSIS — Z79.02 LONG TERM (CURRENT) USE OF ANTITHROMBOTICS/ANTIPLATELETS: ICD-10-CM

## 2025-04-17 DIAGNOSIS — Z86.73 PERSONAL HISTORY OF TRANSIENT ISCHEMIC ATTACK (TIA), AND CEREBRAL INFARCTION WITHOUT RESIDUAL DEFICITS: ICD-10-CM

## 2025-04-17 DIAGNOSIS — Z79.84 LONG TERM (CURRENT) USE OF ORAL HYPOGLYCEMIC DRUGS: ICD-10-CM

## 2025-04-17 DIAGNOSIS — E11.22 TYPE 2 DIABETES MELLITUS WITH DIABETIC CHRONIC KIDNEY DISEASE: ICD-10-CM

## 2025-04-17 DIAGNOSIS — Z79.82 LONG TERM (CURRENT) USE OF ASPIRIN: ICD-10-CM

## 2025-04-17 DIAGNOSIS — K64.8 OTHER HEMORRHOIDS: ICD-10-CM

## 2025-04-29 ENCOUNTER — EMERGENCY (EMERGENCY)
Facility: HOSPITAL | Age: 80
LOS: 0 days | Discharge: HOME | End: 2025-04-29
Attending: STUDENT IN AN ORGANIZED HEALTH CARE EDUCATION/TRAINING PROGRAM
Payer: MEDICARE

## 2025-04-29 VITALS
HEART RATE: 78 BPM | OXYGEN SATURATION: 98 % | RESPIRATION RATE: 19 BRPM | DIASTOLIC BLOOD PRESSURE: 91 MMHG | TEMPERATURE: 97 F | SYSTOLIC BLOOD PRESSURE: 181 MMHG

## 2025-04-29 VITALS
SYSTOLIC BLOOD PRESSURE: 120 MMHG | TEMPERATURE: 98 F | RESPIRATION RATE: 18 BRPM | DIASTOLIC BLOOD PRESSURE: 70 MMHG | HEART RATE: 55 BPM | HEIGHT: 65 IN | WEIGHT: 162.04 LBS | OXYGEN SATURATION: 98 %

## 2025-04-29 DIAGNOSIS — G51.0 BELL'S PALSY: ICD-10-CM

## 2025-04-29 DIAGNOSIS — I12.9 HYPERTENSIVE CHRONIC KIDNEY DISEASE WITH STAGE 1 THROUGH STAGE 4 CHRONIC KIDNEY DISEASE, OR UNSPECIFIED CHRONIC KIDNEY DISEASE: ICD-10-CM

## 2025-04-29 DIAGNOSIS — Z79.82 LONG TERM (CURRENT) USE OF ASPIRIN: ICD-10-CM

## 2025-04-29 DIAGNOSIS — Z90.5 ACQUIRED ABSENCE OF KIDNEY: Chronic | ICD-10-CM

## 2025-04-29 DIAGNOSIS — Z90.5 ACQUIRED ABSENCE OF KIDNEY: ICD-10-CM

## 2025-04-29 DIAGNOSIS — Z79.4 LONG TERM (CURRENT) USE OF INSULIN: ICD-10-CM

## 2025-04-29 DIAGNOSIS — E11.22 TYPE 2 DIABETES MELLITUS WITH DIABETIC CHRONIC KIDNEY DISEASE: ICD-10-CM

## 2025-04-29 DIAGNOSIS — N18.9 CHRONIC KIDNEY DISEASE, UNSPECIFIED: ICD-10-CM

## 2025-04-29 DIAGNOSIS — R29.810 FACIAL WEAKNESS: ICD-10-CM

## 2025-04-29 LAB
APPEARANCE UR: CLEAR — SIGNIFICANT CHANGE UP
BILIRUB UR-MCNC: NEGATIVE — SIGNIFICANT CHANGE UP
COLOR SPEC: YELLOW — SIGNIFICANT CHANGE UP
DIFF PNL FLD: NEGATIVE — SIGNIFICANT CHANGE UP
GLUCOSE UR QL: 250 MG/DL
KETONES UR-MCNC: NEGATIVE MG/DL — SIGNIFICANT CHANGE UP
LEUKOCYTE ESTERASE UR-ACNC: ABNORMAL
NITRITE UR-MCNC: NEGATIVE — SIGNIFICANT CHANGE UP
PH UR: 6 — SIGNIFICANT CHANGE UP (ref 5–8)
PROT UR-MCNC: 100 MG/DL
SP GR SPEC: 1.02 — SIGNIFICANT CHANGE UP (ref 1–1.03)
UROBILINOGEN FLD QL: 0.2 MG/DL — SIGNIFICANT CHANGE UP (ref 0.2–1)

## 2025-04-29 PROCEDURE — 99284 EMERGENCY DEPT VISIT MOD MDM: CPT

## 2025-04-29 PROCEDURE — 93010 ELECTROCARDIOGRAM REPORT: CPT

## 2025-04-29 RX ORDER — PREDNISONE 20 MG/1
3 TABLET ORAL
Qty: 21 | Refills: 0
Start: 2025-04-29 | End: 2025-05-05

## 2025-04-29 RX ORDER — HYPROMELLOSE 0.4 %
1 DROPS OPHTHALMIC (EYE)
Qty: 2 | Refills: 0
Start: 2025-04-29 | End: 2025-05-28

## 2025-04-29 RX ORDER — HYPROMELLOSE 0.4 %
1 DROPS OPHTHALMIC (EYE) ONCE
Refills: 0 | Status: COMPLETED | OUTPATIENT
Start: 2025-04-29 | End: 2025-04-29

## 2025-04-29 RX ADMIN — Medication 1 DROP(S): at 21:23

## 2025-04-29 NOTE — ED PROVIDER NOTE - PATIENT PORTAL LINK FT
You can access the FollowMyHealth Patient Portal offered by [facility] by registering at the following website: [email or web address]. By joining [facility]’s FollowMyHealth portal, you will also be able to view your health information using other applications (apps) compatible with our system.

## 2025-04-29 NOTE — ED PROVIDER NOTE - NSFOLLOWUPCLINICS_GEN_ALL_ED_FT
[facility]  Neurology  [address] [place] [address]  Phone: [phone]  Fax: [phone]    [facility]  Neurology  [address] [place] [address]  Phone: [phone]  Fax:     [facility]  Neurology  [address] [place] [address]  Phone: [phone]  Fax:

## 2025-04-29 NOTE — ED PROVIDER NOTE - NSFOLLOWUPINSTRUCTIONS_ED_ALL_ED_FT
Rose Palsy    WHAT YOU NEED TO KNOW:  Bell palsy is a sudden weakness or paralysis of one side of your face. Bell palsy occurs when the nerve that controls the muscles in your face becomes swollen or irritated.    DISCHARGE INSTRUCTIONS:    Medicines:  Medicine may be given to decrease swelling and irritation of your facial nerve. You may receive antiviral medicine if your healthcare provider thinks a virus caused your Bell palsy. Your healthcare provider may also suggest acetaminophen or ibuprofen to reduce pain. These medicines are available without a doctor's order. Ask which medicine to take, and how much you need. Follow directions. Acetaminophen can cause liver damage, and ibuprofen can cause stomach bleeding or kidney damage.  Take your medicine as directed. Contact your healthcare provider if you think your medicine is not helping or if you have side effects. Tell him if you are allergic to any medicine. Keep a list of the medicines, vitamins, and herbs you take. Include the amounts, and when and why you take them. Bring the list or the pill bottles to follow-up visits. Carry your medicine list with you in case of an emergency.  Follow up with your healthcare provider as directed: Write down your questions so you remember to ask them during your visits.  Eye care: Your healthcare provider may recommend that you use artificial tears during the day to keep your eye moist. You may need to use an eye ointment at night. You may also need to wear a patch over your eye and tape it shut while you sleep. This helps to keep your eye from getting dry and infected. Wear sunglasses to protect your eye from direct sunlight. Stay away from places that have fumes, dust, or other particles in the air that may harm your eye.  Physical therapy: A physical therapist may teach you how to massage your face and exercise the nerves and muscles in your face. This may help you get better sooner and prevent long-term problems. You can exercise on your own when your facial movement begins to return. Open and close your eye, wink, and smile wide. Do the exercises for 15 or 20 minutes several times a day.  Contact your healthcare provider if:  You have a fever.  Your eye becomes red, irritated, or painful.  You have questions or concerns about your condition or care.  Seek care immediately or call 911 if:  You develop weakness or numbness on one side of your body (other than your face).  You have double vision, or you lose vision in your eye.  You have trouble thinking clearly.

## 2025-04-29 NOTE — ED ADULT TRIAGE NOTE - CHIEF COMPLAINT QUOTE
Patient brought by son: Son noted that patient dragging left leg while walking and facial droop. Patient denies any symptoms. Oriented to self and place. MCNULTY x 4 without deficit or difficultly Denies weakness, Gait unbalance. Last known well Sunday. + photophobia. Speech clear, no slurring. Vague in Answering questions. . + left facial droop, Unknown if new or old  PMH: CVA, Nephrectomy, Kidney cancer, HTN

## 2025-04-29 NOTE — ED ADULT NURSE NOTE - NSFALLUNIVINTERV_ED_ALL_ED
Bed/Stretcher in lowest position, wheels locked, appropriate side rails in place/Call bell, personal items and telephone in reach/Instruct patient to call for assistance before getting out of bed/chair/stretcher/Non-slip footwear applied when patient is off stretcher/[place] to call system/Physically safe environment - no spills, clutter or unnecessary equipment/Purposeful proactive rounding/Room/bathroom lighting operational, light cord in reach

## 2025-04-29 NOTE — ED PROVIDER NOTE - PHYSICAL EXAMINATION
GENERAL: The patient appears well and is in no apparent distress.    EYES: Pupils equal and reactive.  Extraocular eye movements are intact.    ENT: Head is atraumatic.  Posterior oropharynx is unremarkable.      RESPIRATORY: Lungs are clear to auscultation bilaterally.  The patient has no significant wheezing, rhonchi, or rales.    CARDIOVASCULAR: The patient has a regular rate and rhythm with no significant murmurs, rubs, or gallops.    ABDOMEN: Abdomen is soft, nondistended, and non-peritoneal.  The patient has no focal areas of tenderness.    SKIN: Skin is intact without evidence of significant lacerations or sores.    MUSCULOSKELETAL: Patient has good range of motion of all extremities.  The patient has good distal cap refill.  The patient has palpable distal pulses.  No obvious edema is noted.    NEUROLOGIC: Right-sided facial droop.  Involves the right forehead.  Patient cannot move his right eyebrow.  Cannot completely close his right eyelid.  Noted to have flattened right nasolabial fold.

## 2025-04-29 NOTE — ED PROVIDER NOTE - NS ED ROS FT
CONSTITUTIONAL: No weight loss, fever, chills, weakness or fatigue.    HEENT:    Eyes: No visual loss, blurred vision, double vision or yellow sclerae.  Ears, Nose, Throat: No hearing loss, sneezing, congestion, runny nose or sore throat.    CARDIOVASCULAR: No chest pain, chest pressure or chest discomfort. No palpitations or edema.    RESPIRATORY: No shortness of breath, cough or sputum.    GASTROINTESTINAL: No anorexia, nausea, vomiting or diarrhea. No abdominal pain or blood.    SKIN: No rash or itching.    GENITOURINARY: Patient denies any changes to bowel or bladder function.    NEUROLOGICAL: No headache, dizziness, syncope, paralysis, ataxia, numbness or tingling in the extremities. No change in bowel or bladder control.  Right-sided facial droop    MUSCULOSKELETAL: No muscle, back pain, joint pain or stiffness.

## 2025-04-29 NOTE — ED PROVIDER NOTE - OBJECTIVE STATEMENT
This patient is a 79-year-old male presenting to the emergency department today for a facial droop.  He has a past medical history of hypertension, CKD, diabetes, prior CVA, hypertension status post right nephrectomy, and dementia.  States that he has been experiencing right-sided facial droop for 2 days.  No changes to his vision or hearing.  No chest pain or shortness of breath.  No weakness in his arms or legs.  No abdominal pain.  No nausea or vomiting.  No constipation or diarrhea.  No other complaints at this time.  He is at bedside with his son who provided history for the patient.

## 2025-04-29 NOTE — ED PROVIDER NOTE - CLINICAL SUMMARY MEDICAL DECISION MAKING FREE TEXT BOX
This patient is a 79-year-old male presented to the emergency department today for facial droop.  Patient's family was concerned about a possible UTI so this was sent.  No evidence of urinary tract infection here today.  Patient has evidence of Bell's palsy.  Right-sided facial paralysis that does not spare the forehead.    At this time, we believe that the patient is safe for discharge to home with outpatient follow-up with his PCP.  Patient patient's family stressed understanding.  They are amenable to this plan.  Well-appearing nontoxic at this time.  Discharged home with a prescription for artificial tears, Valtrex, and steroids.

## 2025-05-05 ENCOUNTER — APPOINTMENT (OUTPATIENT)
Dept: INTERNAL MEDICINE | Facility: CLINIC | Age: 80
End: 2025-05-05
Payer: MEDICARE

## 2025-05-05 VITALS
WEIGHT: 162 LBS | BODY MASS INDEX: 26.99 KG/M2 | OXYGEN SATURATION: 97 % | DIASTOLIC BLOOD PRESSURE: 89 MMHG | HEIGHT: 65 IN | SYSTOLIC BLOOD PRESSURE: 163 MMHG | HEART RATE: 73 BPM

## 2025-05-05 DIAGNOSIS — G51.0 BELL'S PALSY: ICD-10-CM

## 2025-05-05 DIAGNOSIS — R41.3 OTHER AMNESIA: ICD-10-CM

## 2025-05-05 DIAGNOSIS — E11.65 TYPE 2 DIABETES MELLITUS WITH HYPERGLYCEMIA: ICD-10-CM

## 2025-05-05 DIAGNOSIS — Z90.5 ACQUIRED ABSENCE OF KIDNEY: ICD-10-CM

## 2025-05-05 DIAGNOSIS — E78.5 HYPERLIPIDEMIA, UNSPECIFIED: ICD-10-CM

## 2025-05-05 DIAGNOSIS — K21.9 GASTRO-ESOPHAGEAL REFLUX DISEASE W/OUT ESOPHAGITIS: ICD-10-CM

## 2025-05-05 DIAGNOSIS — C64.9 MALIGNANT NEOPLASM OF UNSPECIFIED KIDNEY, EXCEPT RENAL PELVIS: ICD-10-CM

## 2025-05-05 DIAGNOSIS — F32.A DEPRESSION, UNSPECIFIED: ICD-10-CM

## 2025-05-05 DIAGNOSIS — Z09 ENCOUNTER FOR FOLLOW-UP EXAMINATION AFTER COMPLETED TREATMENT FOR CONDITIONS OTHER THAN MALIGNANT NEOPLASM: ICD-10-CM

## 2025-05-05 DIAGNOSIS — E11.9 TYPE 2 DIABETES MELLITUS W/OUT COMPLICATIONS: ICD-10-CM

## 2025-05-05 DIAGNOSIS — I10 ESSENTIAL (PRIMARY) HYPERTENSION: ICD-10-CM

## 2025-05-05 PROCEDURE — G2211 COMPLEX E/M VISIT ADD ON: CPT

## 2025-05-05 PROCEDURE — 99215 OFFICE O/P EST HI 40 MIN: CPT

## 2025-05-05 PROCEDURE — 36415 COLL VENOUS BLD VENIPUNCTURE: CPT

## 2025-05-05 RX ORDER — DEXTRAN 70/HYPROMELLOSE 0.1%-0.3%
0.1-0.3 DROPS OPHTHALMIC (EYE) TWICE DAILY
Qty: 1 | Refills: 1 | Status: ACTIVE | COMMUNITY
Start: 2025-05-05 | End: 1900-01-01

## 2025-05-06 LAB
B BURGDOR AB SER-IMP: NEGATIVE
B BURGDOR IGG+IGM SER QL: 0.12 INDEX

## 2025-05-12 NOTE — ED ADULT NURSE NOTE - TEMPLATE LIST FOR HEAD TO TOE ASSESSMENT
I sent the patient a [employer] message asking for a call to discuss assessment results.    Respiratory

## 2025-05-21 ENCOUNTER — RX RENEWAL (OUTPATIENT)
Age: 80
End: 2025-05-21

## 2025-06-10 ENCOUNTER — RX ONLY (RX ONLY)
Age: 80
End: 2025-06-10

## 2025-06-10 ENCOUNTER — OFFICE (OUTPATIENT)
Facility: LOCATION | Age: 80
Setting detail: OPHTHALMOLOGY
End: 2025-06-10
Payer: MEDICARE

## 2025-06-10 DIAGNOSIS — E13.3291: ICD-10-CM

## 2025-06-10 DIAGNOSIS — Z96.1: ICD-10-CM

## 2025-06-10 DIAGNOSIS — H35.372: ICD-10-CM

## 2025-06-10 DIAGNOSIS — E11.3211: ICD-10-CM

## 2025-06-10 DIAGNOSIS — H35.3131: ICD-10-CM

## 2025-06-10 DIAGNOSIS — E13.3312: ICD-10-CM

## 2025-06-10 DIAGNOSIS — E11.3292: ICD-10-CM

## 2025-06-10 DIAGNOSIS — H33.312: ICD-10-CM

## 2025-06-10 PROCEDURE — 92014 COMPRE OPH EXAM EST PT 1/>: CPT | Performed by: OPHTHALMOLOGY

## 2025-06-10 ASSESSMENT — VISUAL ACUITY
OS_BCVA: 20/40+2
OD_BCVA: 20/70

## 2025-06-10 ASSESSMENT — REFRACTION_CURRENTRX
OD_VPRISM_DIRECTION: BF
OS_AXIS: 103
OD_CYLINDER: -1.75
OS_VPRISM_DIRECTION: BF
OD_ADD: +2.50
OD_SPHERE: +0.25
OS_ADD: +2.50
OS_SPHERE: PLANO
OD_OVR_VA: 20/
OS_OVR_VA: 20/
OD_AXIS: 079
OS_CYLINDER: -1.75

## 2025-06-10 ASSESSMENT — REFRACTION_MANIFEST
OS_SPHERE: +0.25
OS_ADD: +2.75
OD_ADD: +2.75
OS_AXIS: 080
OD_AXIS: 080
OD_SPHERE: +0.50
OD_CYLINDER: -2.00
OS_CYLINDER: -2.25

## 2025-06-10 ASSESSMENT — REFRACTION_AUTOREFRACTION
OS_SPHERE: +1.25
OS_CYLINDER: -2.50
OD_SPHERE: +1.25
OD_CYLINDER: -2.50
OS_AXIS: 086
OD_AXIS: 072

## 2025-06-10 ASSESSMENT — TONOMETRY
OS_IOP_MMHG: 14
OD_IOP_MMHG: 14

## 2025-06-10 ASSESSMENT — KERATOMETRY
OS_K2POWER_DIOPTERS: 42.75
OS_AXISANGLE_DEGREES: 083
OD_K2POWER_DIOPTERS: 42.50
OD_K1POWER_DIOPTERS: 41.75
OD_AXISANGLE_DEGREES: 066
OS_K1POWER_DIOPTERS: 41.50

## 2025-06-10 ASSESSMENT — CONFRONTATIONAL VISUAL FIELD TEST (CVF)
OD_FINDINGS: FULL
OS_FINDINGS: FULL

## 2025-06-18 ENCOUNTER — LABORATORY RESULT (OUTPATIENT)
Age: 80
End: 2025-06-18

## 2025-06-19 ENCOUNTER — APPOINTMENT (OUTPATIENT)
Dept: UROLOGY | Facility: CLINIC | Age: 80
End: 2025-06-19
Payer: MEDICARE

## 2025-06-19 VITALS
BODY MASS INDEX: 26.99 KG/M2 | HEART RATE: 74 BPM | SYSTOLIC BLOOD PRESSURE: 152 MMHG | OXYGEN SATURATION: 95 % | TEMPERATURE: 98.3 F | HEIGHT: 65 IN | DIASTOLIC BLOOD PRESSURE: 82 MMHG | WEIGHT: 162 LBS

## 2025-06-19 PROCEDURE — 99214 OFFICE O/P EST MOD 30 MIN: CPT

## 2025-06-19 PROCEDURE — 51798 US URINE CAPACITY MEASURE: CPT

## 2025-06-23 ENCOUNTER — RX RENEWAL (OUTPATIENT)
Age: 80
End: 2025-06-23

## 2025-07-01 ENCOUNTER — OFFICE (OUTPATIENT)
Facility: LOCATION | Age: 80
Setting detail: OPHTHALMOLOGY
End: 2025-07-01
Payer: MEDICARE

## 2025-07-01 DIAGNOSIS — E11.3292: ICD-10-CM

## 2025-07-01 DIAGNOSIS — H35.372: ICD-10-CM

## 2025-07-01 DIAGNOSIS — E13.3291: ICD-10-CM

## 2025-07-01 DIAGNOSIS — H35.3131: ICD-10-CM

## 2025-07-01 DIAGNOSIS — E13.3312: ICD-10-CM

## 2025-07-01 DIAGNOSIS — H33.312: ICD-10-CM

## 2025-07-01 DIAGNOSIS — E11.3211: ICD-10-CM

## 2025-07-01 PROCEDURE — 92012 INTRM OPH EXAM EST PATIENT: CPT | Performed by: OPHTHALMOLOGY

## 2025-07-01 PROCEDURE — 92134 CPTRZ OPH DX IMG PST SGM RTA: CPT | Performed by: OPHTHALMOLOGY

## 2025-07-01 PROCEDURE — DEFER/DELA DEFER/DELAY 30 DAY: Performed by: OPHTHALMOLOGY

## 2025-07-01 ASSESSMENT — REFRACTION_CURRENTRX
OD_ADD: +2.50
OS_SPHERE: PLANO
OS_CYLINDER: -1.75
OD_CYLINDER: -1.75
OD_OVR_VA: 20/
OD_VPRISM_DIRECTION: BF
OS_AXIS: 103
OS_ADD: +2.50
OS_OVR_VA: 20/
OD_SPHERE: +0.25
OD_AXIS: 079
OS_VPRISM_DIRECTION: BF

## 2025-07-01 ASSESSMENT — REFRACTION_MANIFEST
OD_ADD: +2.75
OS_ADD: +2.75
OD_SPHERE: +0.50
OD_AXIS: 080
OD_CYLINDER: -2.00
OS_SPHERE: +0.25
OS_CYLINDER: -2.25
OS_AXIS: 080

## 2025-07-01 ASSESSMENT — KERATOMETRY
OD_K1POWER_DIOPTERS: 41.75
OS_K1POWER_DIOPTERS: 41.50
OD_AXISANGLE_DEGREES: 066
OS_AXISANGLE_DEGREES: 083
OD_K2POWER_DIOPTERS: 42.50
OS_K2POWER_DIOPTERS: 42.75

## 2025-07-01 ASSESSMENT — REFRACTION_AUTOREFRACTION
OD_AXIS: 072
OS_CYLINDER: -2.50
OD_SPHERE: +1.25
OS_AXIS: 086
OD_CYLINDER: -2.50
OS_SPHERE: +1.25

## 2025-07-01 ASSESSMENT — VISUAL ACUITY
OS_BCVA: 20/40-2
OD_BCVA: 20/125

## 2025-07-02 ENCOUNTER — APPOINTMENT (OUTPATIENT)
Dept: ENDOCRINOLOGY | Facility: CLINIC | Age: 80
End: 2025-07-02
Payer: MEDICARE

## 2025-07-02 VITALS
HEIGHT: 65 IN | BODY MASS INDEX: 26.82 KG/M2 | OXYGEN SATURATION: 96 % | HEART RATE: 69 BPM | RESPIRATION RATE: 16 BRPM | DIASTOLIC BLOOD PRESSURE: 67 MMHG | SYSTOLIC BLOOD PRESSURE: 136 MMHG | WEIGHT: 161 LBS

## 2025-07-02 LAB — GLUCOSE BLDC GLUCOMTR-MCNC: 148

## 2025-07-02 PROCEDURE — 99214 OFFICE O/P EST MOD 30 MIN: CPT

## 2025-07-02 PROCEDURE — G2211 COMPLEX E/M VISIT ADD ON: CPT

## 2025-07-02 PROCEDURE — 82962 GLUCOSE BLOOD TEST: CPT

## 2025-07-02 PROCEDURE — 36415 COLL VENOUS BLD VENIPUNCTURE: CPT

## 2025-07-07 ENCOUNTER — TRANSCRIPTION ENCOUNTER (OUTPATIENT)
Age: 80
End: 2025-07-07

## 2025-07-07 LAB
ALBUMIN SERPL ELPH-MCNC: 3.8 G/DL
ALP BLD-CCNC: 99 U/L
ALT SERPL-CCNC: 23 U/L
ANION GAP SERPL CALC-SCNC: 13 MMOL/L
AST SERPL-CCNC: 14 U/L
BILIRUB SERPL-MCNC: 0.5 MG/DL
BUN SERPL-MCNC: 33 MG/DL
CALCIUM SERPL-MCNC: 9.7 MG/DL
CHLORIDE SERPL-SCNC: 102 MMOL/L
CHOLEST SERPL-MCNC: 132 MG/DL
CO2 SERPL-SCNC: 22 MMOL/L
CREAT SERPL-MCNC: 2.11 MG/DL
EGFRCR SERPLBLD CKD-EPI 2021: 31 ML/MIN/1.73M2
ESTIMATED AVERAGE GLUCOSE: 166 MG/DL
GLUCOSE SERPL-MCNC: 164 MG/DL
HBA1C MFR BLD HPLC: 7.4 %
HDLC SERPL-MCNC: 35 MG/DL
LDLC SERPL-MCNC: 74 MG/DL
NONHDLC SERPL-MCNC: 96 MG/DL
POTASSIUM SERPL-SCNC: 4.8 MMOL/L
PROT SERPL-MCNC: 6.3 G/DL
SODIUM SERPL-SCNC: 137 MMOL/L
TRIGL SERPL-MCNC: 125 MG/DL

## 2025-07-21 ENCOUNTER — APPOINTMENT (OUTPATIENT)
Dept: INTERNAL MEDICINE | Facility: CLINIC | Age: 80
End: 2025-07-21
Payer: MEDICARE

## 2025-07-21 VITALS
BODY MASS INDEX: 27.16 KG/M2 | WEIGHT: 163 LBS | OXYGEN SATURATION: 95 % | SYSTOLIC BLOOD PRESSURE: 123 MMHG | DIASTOLIC BLOOD PRESSURE: 73 MMHG | HEIGHT: 65 IN | HEART RATE: 76 BPM

## 2025-07-21 DIAGNOSIS — E11.9 TYPE 2 DIABETES MELLITUS W/OUT COMPLICATIONS: ICD-10-CM

## 2025-07-21 DIAGNOSIS — H40.9 UNSPECIFIED GLAUCOMA: ICD-10-CM

## 2025-07-21 DIAGNOSIS — E78.5 HYPERLIPIDEMIA, UNSPECIFIED: ICD-10-CM

## 2025-07-21 DIAGNOSIS — I10 ESSENTIAL (PRIMARY) HYPERTENSION: ICD-10-CM

## 2025-07-21 DIAGNOSIS — K21.9 GASTRO-ESOPHAGEAL REFLUX DISEASE W/OUT ESOPHAGITIS: ICD-10-CM

## 2025-07-21 DIAGNOSIS — N28.9 DISORDER OF KIDNEY AND URETER, UNSPECIFIED: ICD-10-CM

## 2025-07-21 DIAGNOSIS — E83.52 HYPERCALCEMIA: ICD-10-CM

## 2025-07-21 DIAGNOSIS — J45.909 UNSPECIFIED ASTHMA, UNCOMPLICATED: ICD-10-CM

## 2025-07-21 DIAGNOSIS — L81.9 DISORDER OF PIGMENTATION, UNSPECIFIED: ICD-10-CM

## 2025-07-21 DIAGNOSIS — N18.32 CHRONIC KIDNEY DISEASE, STAGE 3B: ICD-10-CM

## 2025-07-21 PROCEDURE — 99214 OFFICE O/P EST MOD 30 MIN: CPT

## 2025-07-21 PROCEDURE — G2211 COMPLEX E/M VISIT ADD ON: CPT

## 2025-09-01 ENCOUNTER — RX RENEWAL (OUTPATIENT)
Age: 80
End: 2025-09-01

## 2025-09-06 ENCOUNTER — RX RENEWAL (OUTPATIENT)
Age: 80
End: 2025-09-06

## (undated) DEVICE — FORCEP RADIAL JAW 4 240CM DISP

## (undated) DEVICE — KIT ENDO PROCEDURE CUST W/VLV

## (undated) DEVICE — GOWN IMPERV BREATHABLE XL

## (undated) DEVICE — TUBING IV SET GRAVITY 3Y 100" MACRO

## (undated) DEVICE — CONTAINER FORMALIN 80ML YELLOW

## (undated) DEVICE — POLY TRAP ETRAP

## (undated) DEVICE — ELCTR ECG CONDUCTIVE ADHESIVE

## (undated) DEVICE — TUBING SUCTION NONCONDUCTIVE 6MM X 12FT

## (undated) DEVICE — TUBING HYBRID CO2

## (undated) DEVICE — SNARE EXACTO COLD 9MMX230CM

## (undated) DEVICE — TUBING MEDI-VAC W MAXIGRIP CONNECTORS 1/4"X6'

## (undated) DEVICE — Device